# Patient Record
Sex: MALE | Race: WHITE | Employment: OTHER | URBAN - METROPOLITAN AREA
[De-identification: names, ages, dates, MRNs, and addresses within clinical notes are randomized per-mention and may not be internally consistent; named-entity substitution may affect disease eponyms.]

---

## 2017-01-18 PROBLEM — E83.51 HYPOCALCEMIA: Status: ACTIVE | Noted: 2017-01-18

## 2017-01-18 PROBLEM — E83.39 HYPOPHOSPHATEMIA: Status: ACTIVE | Noted: 2017-01-18

## 2017-01-18 PROBLEM — R77.8 ELEVATED TROPONIN: Status: ACTIVE | Noted: 2017-01-18

## 2017-01-18 PROBLEM — Z45.02 DEFIBRILLATOR DISCHARGE: Status: ACTIVE | Noted: 2017-01-18

## 2017-01-18 PROBLEM — R55 SYNCOPE: Status: ACTIVE | Noted: 2017-01-18

## 2017-01-20 PROBLEM — I47.20 VENTRICULAR TACHYCARDIA (HCC): Status: ACTIVE | Noted: 2017-01-20

## 2017-03-25 ENCOUNTER — HOSPITAL ENCOUNTER (INPATIENT)
Age: 72
LOS: 3 days | Discharge: HOME | DRG: 310 | End: 2017-03-28
Attending: INTERNAL MEDICINE | Admitting: INTERNAL MEDICINE
Payer: COMMERCIAL

## 2017-03-25 PROBLEM — Z86.79 H/O VENTRICULAR TACHYCARDIA: Chronic | Status: ACTIVE | Noted: 2017-03-25

## 2017-03-25 LAB
ANION GAP SERPL CALCULATED.3IONS-SCNC: 17 MMOL/L (ref 9–17)
BUN BLDV-MCNC: 31 MG/DL (ref 8–23)
BUN/CREAT BLD: ABNORMAL (ref 9–20)
CALCIUM SERPL-MCNC: 8.6 MG/DL (ref 8.6–10.4)
CHLORIDE BLD-SCNC: 102 MMOL/L (ref 98–107)
CO2: 20 MMOL/L (ref 20–31)
CREAT SERPL-MCNC: 1.22 MG/DL (ref 0.7–1.2)
GFR AFRICAN AMERICAN: >60 ML/MIN
GFR NON-AFRICAN AMERICAN: 58 ML/MIN
GFR SERPL CREATININE-BSD FRML MDRD: ABNORMAL ML/MIN/{1.73_M2}
GFR SERPL CREATININE-BSD FRML MDRD: ABNORMAL ML/MIN/{1.73_M2}
GLUCOSE BLD-MCNC: 146 MG/DL (ref 70–99)
GLUCOSE BLD-MCNC: 148 MG/DL (ref 75–110)
GLUCOSE BLD-MCNC: 205 MG/DL (ref 75–110)
GLUCOSE BLD-MCNC: 224 MG/DL (ref 75–110)
HCT VFR BLD CALC: 38 % (ref 41–53)
HEMOGLOBIN: 13.4 G/DL (ref 13.5–17.5)
INR BLD: 1
MAGNESIUM: 2.4 MG/DL (ref 1.6–2.6)
MCH RBC QN AUTO: 31.7 PG (ref 26–34)
MCHC RBC AUTO-ENTMCNC: 35.2 G/DL (ref 31–37)
MCV RBC AUTO: 89.9 FL (ref 80–100)
PARTIAL THROMBOPLASTIN TIME: 24.4 SEC (ref 21.3–31.3)
PDW BLD-RTO: 13.3 % (ref 12.5–15.4)
PLATELET # BLD: 188 K/UL (ref 140–450)
PMV BLD AUTO: 7.8 FL (ref 6–12)
POTASSIUM SERPL-SCNC: 4.3 MMOL/L (ref 3.7–5.3)
PROTHROMBIN TIME: 11.2 SEC (ref 9.4–12.6)
RBC # BLD: 4.23 M/UL (ref 4.5–5.9)
SODIUM BLD-SCNC: 139 MMOL/L (ref 135–144)
TROPONIN INTERP: NORMAL
TROPONIN T: <0.03 NG/ML
WBC # BLD: 7 K/UL (ref 3.5–11)

## 2017-03-25 PROCEDURE — 36415 COLL VENOUS BLD VENIPUNCTURE: CPT

## 2017-03-25 PROCEDURE — 6370000000 HC RX 637 (ALT 250 FOR IP): Performed by: INTERNAL MEDICINE

## 2017-03-25 PROCEDURE — 6360000002 HC RX W HCPCS: Performed by: INTERNAL MEDICINE

## 2017-03-25 PROCEDURE — 80048 BASIC METABOLIC PNL TOTAL CA: CPT

## 2017-03-25 PROCEDURE — 2000000000 HC ICU R&B

## 2017-03-25 PROCEDURE — 93005 ELECTROCARDIOGRAM TRACING: CPT

## 2017-03-25 PROCEDURE — 83036 HEMOGLOBIN GLYCOSYLATED A1C: CPT

## 2017-03-25 PROCEDURE — 85730 THROMBOPLASTIN TIME PARTIAL: CPT

## 2017-03-25 PROCEDURE — 85610 PROTHROMBIN TIME: CPT

## 2017-03-25 PROCEDURE — 84484 ASSAY OF TROPONIN QUANT: CPT

## 2017-03-25 PROCEDURE — 2580000003 HC RX 258: Performed by: INTERNAL MEDICINE

## 2017-03-25 PROCEDURE — 83735 ASSAY OF MAGNESIUM: CPT

## 2017-03-25 PROCEDURE — 85027 COMPLETE CBC AUTOMATED: CPT

## 2017-03-25 PROCEDURE — 82947 ASSAY GLUCOSE BLOOD QUANT: CPT

## 2017-03-25 RX ORDER — NIFEDIPINE 30 MG/1
30 TABLET, EXTENDED RELEASE ORAL DAILY
Status: DISCONTINUED | OUTPATIENT
Start: 2017-03-25 | End: 2017-03-28 | Stop reason: HOSPADM

## 2017-03-25 RX ORDER — FUROSEMIDE 40 MG/1
40 TABLET ORAL DAILY
Status: DISCONTINUED | OUTPATIENT
Start: 2017-03-25 | End: 2017-03-28 | Stop reason: HOSPADM

## 2017-03-25 RX ORDER — LISINOPRIL 20 MG/1
20 TABLET ORAL DAILY
Status: DISCONTINUED | OUTPATIENT
Start: 2017-03-25 | End: 2017-03-25

## 2017-03-25 RX ORDER — HYDROCODONE BITARTRATE AND ACETAMINOPHEN 5; 325 MG/1; MG/1
1 TABLET ORAL EVERY 6 HOURS PRN
Status: DISCONTINUED | OUTPATIENT
Start: 2017-03-25 | End: 2017-03-28 | Stop reason: HOSPADM

## 2017-03-25 RX ORDER — POTASSIUM CHLORIDE 7.45 MG/ML
10 INJECTION INTRAVENOUS PRN
Status: DISCONTINUED | OUTPATIENT
Start: 2017-03-25 | End: 2017-03-28 | Stop reason: HOSPADM

## 2017-03-25 RX ORDER — DEXTROSE MONOHYDRATE 50 MG/ML
100 INJECTION, SOLUTION INTRAVENOUS PRN
Status: DISCONTINUED | OUTPATIENT
Start: 2017-03-25 | End: 2017-03-28 | Stop reason: HOSPADM

## 2017-03-25 RX ORDER — ASPIRIN 81 MG/1
81 TABLET, CHEWABLE ORAL DAILY
Status: DISCONTINUED | OUTPATIENT
Start: 2017-03-25 | End: 2017-03-28 | Stop reason: HOSPADM

## 2017-03-25 RX ORDER — GLIMEPIRIDE 2 MG/1
1 TABLET ORAL
Status: DISCONTINUED | OUTPATIENT
Start: 2017-03-25 | End: 2017-03-28 | Stop reason: HOSPADM

## 2017-03-25 RX ORDER — LISINOPRIL 20 MG/1
20 TABLET ORAL DAILY
Status: DISCONTINUED | OUTPATIENT
Start: 2017-03-26 | End: 2017-03-28 | Stop reason: HOSPADM

## 2017-03-25 RX ORDER — CLOPIDOGREL BISULFATE 75 MG/1
75 TABLET ORAL DAILY
Status: DISCONTINUED | OUTPATIENT
Start: 2017-03-25 | End: 2017-03-28 | Stop reason: HOSPADM

## 2017-03-25 RX ORDER — HEPARIN SODIUM 5000 [USP'U]/ML
5000 INJECTION, SOLUTION INTRAVENOUS; SUBCUTANEOUS 2 TIMES DAILY
Status: DISCONTINUED | OUTPATIENT
Start: 2017-03-25 | End: 2017-03-28 | Stop reason: HOSPADM

## 2017-03-25 RX ORDER — ASPIRIN 81 MG/1
81 TABLET, CHEWABLE ORAL DAILY
Status: DISCONTINUED | OUTPATIENT
Start: 2017-03-25 | End: 2017-03-25

## 2017-03-25 RX ORDER — ONDANSETRON 2 MG/ML
4 INJECTION INTRAMUSCULAR; INTRAVENOUS EVERY 6 HOURS PRN
Status: DISCONTINUED | OUTPATIENT
Start: 2017-03-25 | End: 2017-03-28 | Stop reason: HOSPADM

## 2017-03-25 RX ORDER — ACETAMINOPHEN 325 MG/1
650 TABLET ORAL EVERY 4 HOURS PRN
Status: DISCONTINUED | OUTPATIENT
Start: 2017-03-25 | End: 2017-03-28 | Stop reason: HOSPADM

## 2017-03-25 RX ORDER — SODIUM CHLORIDE 0.9 % (FLUSH) 0.9 %
10 SYRINGE (ML) INJECTION PRN
Status: DISCONTINUED | OUTPATIENT
Start: 2017-03-25 | End: 2017-03-28 | Stop reason: HOSPADM

## 2017-03-25 RX ORDER — NICOTINE POLACRILEX 4 MG
15 LOZENGE BUCCAL PRN
Status: DISCONTINUED | OUTPATIENT
Start: 2017-03-25 | End: 2017-03-28 | Stop reason: HOSPADM

## 2017-03-25 RX ORDER — POTASSIUM CHLORIDE 20MEQ/15ML
40 LIQUID (ML) ORAL PRN
Status: DISCONTINUED | OUTPATIENT
Start: 2017-03-25 | End: 2017-03-28 | Stop reason: HOSPADM

## 2017-03-25 RX ORDER — ATORVASTATIN CALCIUM 80 MG/1
80 TABLET, FILM COATED ORAL NIGHTLY
Status: DISCONTINUED | OUTPATIENT
Start: 2017-03-25 | End: 2017-03-28 | Stop reason: HOSPADM

## 2017-03-25 RX ORDER — SODIUM CHLORIDE 0.9 % (FLUSH) 0.9 %
10 SYRINGE (ML) INJECTION EVERY 12 HOURS SCHEDULED
Status: DISCONTINUED | OUTPATIENT
Start: 2017-03-25 | End: 2017-03-28 | Stop reason: HOSPADM

## 2017-03-25 RX ORDER — DEXTROSE MONOHYDRATE 25 G/50ML
12.5 INJECTION, SOLUTION INTRAVENOUS PRN
Status: DISCONTINUED | OUTPATIENT
Start: 2017-03-25 | End: 2017-03-28 | Stop reason: HOSPADM

## 2017-03-25 RX ORDER — POTASSIUM CHLORIDE 20 MEQ/1
40 TABLET, EXTENDED RELEASE ORAL PRN
Status: DISCONTINUED | OUTPATIENT
Start: 2017-03-25 | End: 2017-03-28 | Stop reason: HOSPADM

## 2017-03-25 RX ORDER — TRIAMTERENE AND HYDROCHLOROTHIAZIDE 75; 50 MG/1; MG/1
1 TABLET ORAL DAILY
Status: DISCONTINUED | OUTPATIENT
Start: 2017-03-25 | End: 2017-03-25

## 2017-03-25 RX ORDER — LIDOCAINE HYDROCHLORIDE ANHYDROUS AND DEXTROSE MONOHYDRATE .4; 5 G/100ML; G/100ML
1 INJECTION, SOLUTION INTRAVENOUS CONTINUOUS
Status: DISCONTINUED | OUTPATIENT
Start: 2017-03-25 | End: 2017-03-25

## 2017-03-25 RX ORDER — AMIODARONE HYDROCHLORIDE 200 MG/1
200 TABLET ORAL DAILY
Status: DISCONTINUED | OUTPATIENT
Start: 2017-03-25 | End: 2017-03-25

## 2017-03-25 RX ORDER — MELOXICAM 7.5 MG/1
15 TABLET ORAL DAILY
Status: DISCONTINUED | OUTPATIENT
Start: 2017-03-25 | End: 2017-03-25

## 2017-03-25 RX ORDER — FOLIC ACID 1 MG/1
1 TABLET ORAL DAILY
Status: DISCONTINUED | OUTPATIENT
Start: 2017-03-25 | End: 2017-03-28 | Stop reason: HOSPADM

## 2017-03-25 RX ADMIN — LISINOPRIL 20 MG: 20 TABLET ORAL at 10:03

## 2017-03-25 RX ADMIN — Medication 10 ML: at 21:57

## 2017-03-25 RX ADMIN — FOLIC ACID 1 MG: 1 TABLET ORAL at 10:02

## 2017-03-25 RX ADMIN — CLOPIDOGREL 75 MG: 75 TABLET, FILM COATED ORAL at 10:02

## 2017-03-25 RX ADMIN — HEPARIN SODIUM 5000 UNITS: 5000 INJECTION, SOLUTION INTRAVENOUS; SUBCUTANEOUS at 13:15

## 2017-03-25 RX ADMIN — AMIODARONE HYDROCHLORIDE 1 MG/MIN: 50 INJECTION, SOLUTION INTRAVENOUS at 16:15

## 2017-03-25 RX ADMIN — ATORVASTATIN CALCIUM 80 MG: 80 TABLET, FILM COATED ORAL at 21:56

## 2017-03-25 RX ADMIN — METOPROLOL TARTRATE 25 MG: 25 TABLET ORAL at 14:00

## 2017-03-25 RX ADMIN — NIFEDIPINE 30 MG: 30 TABLET, FILM COATED, EXTENDED RELEASE ORAL at 10:13

## 2017-03-25 RX ADMIN — METFORMIN HYDROCHLORIDE 1000 MG: 500 TABLET ORAL at 10:03

## 2017-03-25 RX ADMIN — FUROSEMIDE 40 MG: 40 TABLET ORAL at 10:02

## 2017-03-25 RX ADMIN — ASPIRIN 81 MG: 81 TABLET, CHEWABLE ORAL at 10:02

## 2017-03-25 RX ADMIN — TRIAMTERENE AND HYDROCHLOROTHIAZIDE 1 TABLET: 75; 50 TABLET ORAL at 10:05

## 2017-03-25 RX ADMIN — LIDOCAINE HYDROCHLORIDE 1 MG/MIN: 4 INJECTION, SOLUTION INTRAVENOUS at 07:10

## 2017-03-25 RX ADMIN — DEXTROSE 300 MG: 50 INJECTION, SOLUTION INTRAVENOUS at 15:44

## 2017-03-25 RX ADMIN — AMIODARONE HYDROCHLORIDE 200 MG: 200 TABLET ORAL at 10:02

## 2017-03-25 RX ADMIN — MELOXICAM 15 MG: 7.5 TABLET ORAL at 10:04

## 2017-03-25 RX ADMIN — INSULIN LISPRO 1 UNITS: 100 INJECTION, SOLUTION INTRAVENOUS; SUBCUTANEOUS at 21:56

## 2017-03-25 RX ADMIN — HEPARIN SODIUM 5000 UNITS: 5000 INJECTION, SOLUTION INTRAVENOUS; SUBCUTANEOUS at 21:56

## 2017-03-25 RX ADMIN — INSULIN LISPRO 2 UNITS: 100 INJECTION, SOLUTION INTRAVENOUS; SUBCUTANEOUS at 17:11

## 2017-03-25 RX ADMIN — HYDROCODONE BITARTRATE AND ACETAMINOPHEN 1 TABLET: 5; 325 TABLET ORAL at 23:56

## 2017-03-25 RX ADMIN — Medication 10 ML: at 15:02

## 2017-03-25 RX ADMIN — HYDROCODONE BITARTRATE AND ACETAMINOPHEN 1 TABLET: 5; 325 TABLET ORAL at 16:26

## 2017-03-25 ASSESSMENT — PAIN DESCRIPTION - ORIENTATION: ORIENTATION: LOWER

## 2017-03-25 ASSESSMENT — PAIN SCALES - GENERAL
PAINLEVEL_OUTOF10: 8
PAINLEVEL_OUTOF10: 8
PAINLEVEL_OUTOF10: 0
PAINLEVEL_OUTOF10: 8
PAINLEVEL_OUTOF10: 0
PAINLEVEL_OUTOF10: 10

## 2017-03-25 ASSESSMENT — PAIN DESCRIPTION - PAIN TYPE: TYPE: CHRONIC PAIN

## 2017-03-25 ASSESSMENT — PAIN DESCRIPTION - LOCATION: LOCATION: BACK

## 2017-03-26 LAB
ABSOLUTE EOS #: 0.2 K/UL (ref 0–0.4)
ABSOLUTE LYMPH #: 1.9 K/UL (ref 1–4.8)
ABSOLUTE MONO #: 0.7 K/UL (ref 0.1–1.2)
ANION GAP SERPL CALCULATED.3IONS-SCNC: 13 MMOL/L (ref 9–17)
BASOPHILS # BLD: 0 % (ref 0–2)
BASOPHILS ABSOLUTE: 0 K/UL (ref 0–0.2)
BNP INTERPRETATION: ABNORMAL
BUN BLDV-MCNC: 33 MG/DL (ref 8–23)
BUN/CREAT BLD: ABNORMAL (ref 9–20)
CALCIUM SERPL-MCNC: 8.6 MG/DL (ref 8.6–10.4)
CHLORIDE BLD-SCNC: 100 MMOL/L (ref 98–107)
CO2: 26 MMOL/L (ref 20–31)
CREAT SERPL-MCNC: 1.24 MG/DL (ref 0.7–1.2)
DIFFERENTIAL TYPE: ABNORMAL
EKG ATRIAL RATE: 61 BPM
EKG Q-T INTERVAL: 496 MS
EKG QRS DURATION: 150 MS
EKG QTC CALCULATION (BAZETT): 496 MS
EKG R AXIS: -45 DEGREES
EKG T AXIS: -24 DEGREES
EKG VENTRICULAR RATE: 60 BPM
EOSINOPHILS RELATIVE PERCENT: 3 % (ref 1–4)
GFR AFRICAN AMERICAN: >60 ML/MIN
GFR NON-AFRICAN AMERICAN: 57 ML/MIN
GFR SERPL CREATININE-BSD FRML MDRD: ABNORMAL ML/MIN/{1.73_M2}
GFR SERPL CREATININE-BSD FRML MDRD: ABNORMAL ML/MIN/{1.73_M2}
GLUCOSE BLD-MCNC: 107 MG/DL (ref 75–110)
GLUCOSE BLD-MCNC: 136 MG/DL (ref 75–110)
GLUCOSE BLD-MCNC: 207 MG/DL (ref 75–110)
GLUCOSE BLD-MCNC: 243 MG/DL (ref 75–110)
GLUCOSE BLD-MCNC: 91 MG/DL (ref 70–99)
HCT VFR BLD CALC: 39.6 % (ref 41–53)
HEMOGLOBIN: 14 G/DL (ref 13.5–17.5)
LYMPHOCYTES # BLD: 27 % (ref 24–44)
MAGNESIUM: 2.3 MG/DL (ref 1.6–2.6)
MCH RBC QN AUTO: 31.5 PG (ref 26–34)
MCHC RBC AUTO-ENTMCNC: 35.3 G/DL (ref 31–37)
MCV RBC AUTO: 89.3 FL (ref 80–100)
MONOCYTES # BLD: 10 % (ref 2–11)
PDW BLD-RTO: 13.5 % (ref 12.5–15.4)
PLATELET # BLD: 176 K/UL (ref 140–450)
PLATELET ESTIMATE: ABNORMAL
PMV BLD AUTO: 8 FL (ref 6–12)
POTASSIUM SERPL-SCNC: 3.5 MMOL/L (ref 3.7–5.3)
PRO-BNP: 469 PG/ML
RBC # BLD: 4.44 M/UL (ref 4.5–5.9)
RBC # BLD: ABNORMAL 10*6/UL
SEG NEUTROPHILS: 60 % (ref 36–66)
SEGMENTED NEUTROPHILS ABSOLUTE COUNT: 4 K/UL (ref 1.8–7.7)
SODIUM BLD-SCNC: 139 MMOL/L (ref 135–144)
WBC # BLD: 6.8 K/UL (ref 3.5–11)
WBC # BLD: ABNORMAL 10*3/UL

## 2017-03-26 PROCEDURE — 2580000003 HC RX 258: Performed by: INTERNAL MEDICINE

## 2017-03-26 PROCEDURE — 6360000002 HC RX W HCPCS: Performed by: INTERNAL MEDICINE

## 2017-03-26 PROCEDURE — 85025 COMPLETE CBC W/AUTO DIFF WBC: CPT

## 2017-03-26 PROCEDURE — 2000000000 HC ICU R&B

## 2017-03-26 PROCEDURE — 82947 ASSAY GLUCOSE BLOOD QUANT: CPT

## 2017-03-26 PROCEDURE — 83735 ASSAY OF MAGNESIUM: CPT

## 2017-03-26 PROCEDURE — 80048 BASIC METABOLIC PNL TOTAL CA: CPT

## 2017-03-26 PROCEDURE — 6370000000 HC RX 637 (ALT 250 FOR IP): Performed by: INTERNAL MEDICINE

## 2017-03-26 PROCEDURE — 83880 ASSAY OF NATRIURETIC PEPTIDE: CPT

## 2017-03-26 PROCEDURE — 36415 COLL VENOUS BLD VENIPUNCTURE: CPT

## 2017-03-26 RX ORDER — TRIAMTERENE AND HYDROCHLOROTHIAZIDE 37.5; 25 MG/1; MG/1
1 TABLET ORAL DAILY
Status: DISCONTINUED | OUTPATIENT
Start: 2017-03-26 | End: 2017-03-28 | Stop reason: HOSPADM

## 2017-03-26 RX ADMIN — Medication 10 ML: at 09:15

## 2017-03-26 RX ADMIN — ASPIRIN 81 MG: 81 TABLET, CHEWABLE ORAL at 09:24

## 2017-03-26 RX ADMIN — AMIODARONE HYDROCHLORIDE 0.5 MG/MIN: 50 INJECTION, SOLUTION INTRAVENOUS at 15:43

## 2017-03-26 RX ADMIN — NIFEDIPINE 30 MG: 30 TABLET, FILM COATED, EXTENDED RELEASE ORAL at 09:04

## 2017-03-26 RX ADMIN — Medication 10 ML: at 21:04

## 2017-03-26 RX ADMIN — GLIMEPIRIDE 1 MG: 2 TABLET ORAL at 09:04

## 2017-03-26 RX ADMIN — INSULIN LISPRO 1 UNITS: 100 INJECTION, SOLUTION INTRAVENOUS; SUBCUTANEOUS at 21:03

## 2017-03-26 RX ADMIN — POTASSIUM CHLORIDE 40 MEQ: 20 TABLET, EXTENDED RELEASE ORAL at 09:13

## 2017-03-26 RX ADMIN — HYDROCODONE BITARTRATE AND ACETAMINOPHEN 1 TABLET: 5; 325 TABLET ORAL at 09:12

## 2017-03-26 RX ADMIN — AMIODARONE HYDROCHLORIDE 0.5 MG/MIN: 50 INJECTION, SOLUTION INTRAVENOUS at 00:56

## 2017-03-26 RX ADMIN — HYDROCODONE BITARTRATE AND ACETAMINOPHEN 1 TABLET: 5; 325 TABLET ORAL at 19:32

## 2017-03-26 RX ADMIN — ATORVASTATIN CALCIUM 80 MG: 80 TABLET, FILM COATED ORAL at 21:03

## 2017-03-26 RX ADMIN — TRIAMTERENE AND HYDROCHLOROTHIAZIDE 1 TABLET: 37.5; 25 TABLET ORAL at 10:14

## 2017-03-26 RX ADMIN — HEPARIN SODIUM 5000 UNITS: 5000 INJECTION, SOLUTION INTRAVENOUS; SUBCUTANEOUS at 09:05

## 2017-03-26 RX ADMIN — LISINOPRIL 20 MG: 20 TABLET ORAL at 09:20

## 2017-03-26 RX ADMIN — FUROSEMIDE 40 MG: 40 TABLET ORAL at 09:22

## 2017-03-26 RX ADMIN — CLOPIDOGREL 75 MG: 75 TABLET, FILM COATED ORAL at 09:04

## 2017-03-26 RX ADMIN — HEPARIN SODIUM 5000 UNITS: 5000 INJECTION, SOLUTION INTRAVENOUS; SUBCUTANEOUS at 21:03

## 2017-03-26 RX ADMIN — FOLIC ACID 1 MG: 1 TABLET ORAL at 09:04

## 2017-03-26 RX ADMIN — INSULIN LISPRO 2 UNITS: 100 INJECTION, SOLUTION INTRAVENOUS; SUBCUTANEOUS at 13:21

## 2017-03-26 ASSESSMENT — PAIN DESCRIPTION - LOCATION
LOCATION: GENERALIZED
LOCATION: BACK

## 2017-03-26 ASSESSMENT — PAIN SCALES - GENERAL
PAINLEVEL_OUTOF10: 1
PAINLEVEL_OUTOF10: 0
PAINLEVEL_OUTOF10: 7
PAINLEVEL_OUTOF10: 0

## 2017-03-26 ASSESSMENT — PAIN DESCRIPTION - ORIENTATION: ORIENTATION: LOWER

## 2017-03-26 ASSESSMENT — PAIN DESCRIPTION - PAIN TYPE: TYPE: CHRONIC PAIN

## 2017-03-27 LAB
ANION GAP SERPL CALCULATED.3IONS-SCNC: 14 MMOL/L (ref 9–17)
BUN BLDV-MCNC: 27 MG/DL (ref 8–23)
BUN/CREAT BLD: ABNORMAL (ref 9–20)
CALCIUM SERPL-MCNC: 8.6 MG/DL (ref 8.6–10.4)
CHLORIDE BLD-SCNC: 99 MMOL/L (ref 98–107)
CO2: 25 MMOL/L (ref 20–31)
CREAT SERPL-MCNC: 1.25 MG/DL (ref 0.7–1.2)
ESTIMATED AVERAGE GLUCOSE: 131 MG/DL
GFR AFRICAN AMERICAN: >60 ML/MIN
GFR NON-AFRICAN AMERICAN: 57 ML/MIN
GFR SERPL CREATININE-BSD FRML MDRD: ABNORMAL ML/MIN/{1.73_M2}
GFR SERPL CREATININE-BSD FRML MDRD: ABNORMAL ML/MIN/{1.73_M2}
GLUCOSE BLD-MCNC: 143 MG/DL (ref 75–110)
GLUCOSE BLD-MCNC: 150 MG/DL (ref 75–110)
GLUCOSE BLD-MCNC: 164 MG/DL (ref 75–110)
GLUCOSE BLD-MCNC: 86 MG/DL (ref 70–99)
GLUCOSE BLD-MCNC: 90 MG/DL (ref 75–110)
HBA1C MFR BLD: 6.2 % (ref 4–6)
POTASSIUM SERPL-SCNC: 3.7 MMOL/L (ref 3.7–5.3)
SODIUM BLD-SCNC: 138 MMOL/L (ref 135–144)

## 2017-03-27 PROCEDURE — 36415 COLL VENOUS BLD VENIPUNCTURE: CPT

## 2017-03-27 PROCEDURE — 6360000002 HC RX W HCPCS: Performed by: INTERNAL MEDICINE

## 2017-03-27 PROCEDURE — 6370000000 HC RX 637 (ALT 250 FOR IP): Performed by: INTERNAL MEDICINE

## 2017-03-27 PROCEDURE — 2060000000 HC ICU INTERMEDIATE R&B

## 2017-03-27 PROCEDURE — 82947 ASSAY GLUCOSE BLOOD QUANT: CPT

## 2017-03-27 PROCEDURE — 2580000003 HC RX 258: Performed by: INTERNAL MEDICINE

## 2017-03-27 PROCEDURE — 80048 BASIC METABOLIC PNL TOTAL CA: CPT

## 2017-03-27 RX ORDER — AMIODARONE HYDROCHLORIDE 200 MG/1
200 TABLET ORAL 2 TIMES DAILY
Status: DISCONTINUED | OUTPATIENT
Start: 2017-03-27 | End: 2017-03-28 | Stop reason: HOSPADM

## 2017-03-27 RX ORDER — METOPROLOL SUCCINATE 25 MG/1
12.5 TABLET, EXTENDED RELEASE ORAL DAILY
Status: DISCONTINUED | OUTPATIENT
Start: 2017-03-27 | End: 2017-03-28 | Stop reason: HOSPADM

## 2017-03-27 RX ADMIN — HYDROCODONE BITARTRATE AND ACETAMINOPHEN 1 TABLET: 5; 325 TABLET ORAL at 17:13

## 2017-03-27 RX ADMIN — INSULIN LISPRO 1 UNITS: 100 INJECTION, SOLUTION INTRAVENOUS; SUBCUTANEOUS at 21:14

## 2017-03-27 RX ADMIN — AMIODARONE HYDROCHLORIDE 200 MG: 200 TABLET ORAL at 21:13

## 2017-03-27 RX ADMIN — HYDROCODONE BITARTRATE AND ACETAMINOPHEN 1 TABLET: 5; 325 TABLET ORAL at 04:04

## 2017-03-27 RX ADMIN — NIFEDIPINE 30 MG: 30 TABLET, FILM COATED, EXTENDED RELEASE ORAL at 08:52

## 2017-03-27 RX ADMIN — METOPROLOL SUCCINATE 12.5 MG: 25 TABLET, EXTENDED RELEASE ORAL at 15:52

## 2017-03-27 RX ADMIN — ATORVASTATIN CALCIUM 80 MG: 80 TABLET, FILM COATED ORAL at 21:13

## 2017-03-27 RX ADMIN — HEPARIN SODIUM 5000 UNITS: 5000 INJECTION, SOLUTION INTRAVENOUS; SUBCUTANEOUS at 21:13

## 2017-03-27 RX ADMIN — FOLIC ACID 1 MG: 1 TABLET ORAL at 08:52

## 2017-03-27 RX ADMIN — AMIODARONE HYDROCHLORIDE 200 MG: 200 TABLET ORAL at 09:07

## 2017-03-27 RX ADMIN — LISINOPRIL 20 MG: 20 TABLET ORAL at 08:52

## 2017-03-27 RX ADMIN — GLIMEPIRIDE 1 MG: 2 TABLET ORAL at 08:52

## 2017-03-27 RX ADMIN — FUROSEMIDE 40 MG: 40 TABLET ORAL at 08:52

## 2017-03-27 RX ADMIN — Medication 10 ML: at 21:16

## 2017-03-27 RX ADMIN — CLOPIDOGREL 75 MG: 75 TABLET, FILM COATED ORAL at 08:52

## 2017-03-27 RX ADMIN — INSULIN LISPRO 1 UNITS: 100 INJECTION, SOLUTION INTRAVENOUS; SUBCUTANEOUS at 17:13

## 2017-03-27 RX ADMIN — ASPIRIN 81 MG: 81 TABLET, CHEWABLE ORAL at 08:52

## 2017-03-27 RX ADMIN — TRIAMTERENE AND HYDROCHLOROTHIAZIDE 1 TABLET: 37.5; 25 TABLET ORAL at 08:53

## 2017-03-27 RX ADMIN — HEPARIN SODIUM 5000 UNITS: 5000 INJECTION, SOLUTION INTRAVENOUS; SUBCUTANEOUS at 08:53

## 2017-03-27 RX ADMIN — HYDROCODONE BITARTRATE AND ACETAMINOPHEN 1 TABLET: 5; 325 TABLET ORAL at 23:51

## 2017-03-27 ASSESSMENT — PAIN DESCRIPTION - ORIENTATION: ORIENTATION: UPPER;LOWER

## 2017-03-27 ASSESSMENT — PAIN DESCRIPTION - PAIN TYPE
TYPE: CHRONIC PAIN
TYPE: CHRONIC PAIN

## 2017-03-27 ASSESSMENT — PAIN DESCRIPTION - DESCRIPTORS: DESCRIPTORS: CONSTANT;DISCOMFORT;SHARP

## 2017-03-27 ASSESSMENT — PAIN SCALES - GENERAL
PAINLEVEL_OUTOF10: 8
PAINLEVEL_OUTOF10: 0
PAINLEVEL_OUTOF10: 7
PAINLEVEL_OUTOF10: 0
PAINLEVEL_OUTOF10: 7

## 2017-03-27 ASSESSMENT — PAIN DESCRIPTION - LOCATION
LOCATION: BACK
LOCATION: BACK

## 2017-03-27 ASSESSMENT — PAIN DESCRIPTION - FREQUENCY: FREQUENCY: CONTINUOUS

## 2017-03-27 ASSESSMENT — PAIN DESCRIPTION - ONSET: ONSET: GRADUAL

## 2017-03-28 VITALS
RESPIRATION RATE: 17 BRPM | SYSTOLIC BLOOD PRESSURE: 110 MMHG | WEIGHT: 205.7 LBS | TEMPERATURE: 98.1 F | BODY MASS INDEX: 32.28 KG/M2 | HEIGHT: 67 IN | DIASTOLIC BLOOD PRESSURE: 57 MMHG | HEART RATE: 59 BPM | OXYGEN SATURATION: 98 %

## 2017-03-28 LAB
ANION GAP SERPL CALCULATED.3IONS-SCNC: 16 MMOL/L (ref 9–17)
BUN BLDV-MCNC: 27 MG/DL (ref 8–23)
BUN/CREAT BLD: ABNORMAL (ref 9–20)
CALCIUM SERPL-MCNC: 8.7 MG/DL (ref 8.6–10.4)
CHLORIDE BLD-SCNC: 99 MMOL/L (ref 98–107)
CO2: 26 MMOL/L (ref 20–31)
CREAT SERPL-MCNC: 1.34 MG/DL (ref 0.7–1.2)
GFR AFRICAN AMERICAN: >60 ML/MIN
GFR NON-AFRICAN AMERICAN: 52 ML/MIN
GFR SERPL CREATININE-BSD FRML MDRD: ABNORMAL ML/MIN/{1.73_M2}
GFR SERPL CREATININE-BSD FRML MDRD: ABNORMAL ML/MIN/{1.73_M2}
GLUCOSE BLD-MCNC: 174 MG/DL (ref 75–110)
GLUCOSE BLD-MCNC: 311 MG/DL (ref 75–110)
GLUCOSE BLD-MCNC: 91 MG/DL (ref 70–99)
GLUCOSE BLD-MCNC: 98 MG/DL (ref 75–110)
POTASSIUM SERPL-SCNC: 3.7 MMOL/L (ref 3.7–5.3)
SODIUM BLD-SCNC: 141 MMOL/L (ref 135–144)

## 2017-03-28 PROCEDURE — 80048 BASIC METABOLIC PNL TOTAL CA: CPT

## 2017-03-28 PROCEDURE — 6370000000 HC RX 637 (ALT 250 FOR IP): Performed by: INTERNAL MEDICINE

## 2017-03-28 PROCEDURE — 36415 COLL VENOUS BLD VENIPUNCTURE: CPT

## 2017-03-28 PROCEDURE — 82947 ASSAY GLUCOSE BLOOD QUANT: CPT

## 2017-03-28 PROCEDURE — 6360000002 HC RX W HCPCS: Performed by: INTERNAL MEDICINE

## 2017-03-28 PROCEDURE — 2580000003 HC RX 258: Performed by: INTERNAL MEDICINE

## 2017-03-28 RX ORDER — NITROGLYCERIN 0.4 MG/1
0.4 TABLET SUBLINGUAL EVERY 5 MIN PRN
Qty: 25 TABLET | Refills: 3 | Status: SHIPPED | OUTPATIENT
Start: 2017-03-28

## 2017-03-28 RX ORDER — TRIAMTERENE AND HYDROCHLOROTHIAZIDE 37.5; 25 MG/1; MG/1
1 TABLET ORAL DAILY
Qty: 30 TABLET | Refills: 3 | Status: SHIPPED | OUTPATIENT
Start: 2017-03-28 | End: 2020-02-03 | Stop reason: ALTCHOICE

## 2017-03-28 RX ORDER — METOPROLOL SUCCINATE 25 MG/1
12.5 TABLET, EXTENDED RELEASE ORAL DAILY
Qty: 30 TABLET | Refills: 3 | Status: SHIPPED | OUTPATIENT
Start: 2017-03-28 | End: 2020-02-03 | Stop reason: ALTCHOICE

## 2017-03-28 RX ADMIN — METOPROLOL SUCCINATE 12.5 MG: 25 TABLET, EXTENDED RELEASE ORAL at 08:37

## 2017-03-28 RX ADMIN — HEPARIN SODIUM 5000 UNITS: 5000 INJECTION, SOLUTION INTRAVENOUS; SUBCUTANEOUS at 08:38

## 2017-03-28 RX ADMIN — HYDROCODONE BITARTRATE AND ACETAMINOPHEN 1 TABLET: 5; 325 TABLET ORAL at 09:16

## 2017-03-28 RX ADMIN — CLOPIDOGREL 75 MG: 75 TABLET, FILM COATED ORAL at 08:36

## 2017-03-28 RX ADMIN — TRIAMTERENE AND HYDROCHLOROTHIAZIDE 1 TABLET: 37.5; 25 TABLET ORAL at 08:37

## 2017-03-28 RX ADMIN — Medication 10 ML: at 08:46

## 2017-03-28 RX ADMIN — ASPIRIN 81 MG: 81 TABLET, CHEWABLE ORAL at 08:36

## 2017-03-28 RX ADMIN — NIFEDIPINE 30 MG: 30 TABLET, FILM COATED, EXTENDED RELEASE ORAL at 08:46

## 2017-03-28 RX ADMIN — FUROSEMIDE 40 MG: 40 TABLET ORAL at 08:37

## 2017-03-28 RX ADMIN — AMIODARONE HYDROCHLORIDE 200 MG: 200 TABLET ORAL at 08:36

## 2017-03-28 RX ADMIN — INSULIN LISPRO 4 UNITS: 100 INJECTION, SOLUTION INTRAVENOUS; SUBCUTANEOUS at 12:49

## 2017-03-28 RX ADMIN — LISINOPRIL 20 MG: 20 TABLET ORAL at 08:37

## 2017-03-28 RX ADMIN — GLIMEPIRIDE 1 MG: 2 TABLET ORAL at 08:36

## 2017-03-28 RX ADMIN — FOLIC ACID 1 MG: 1 TABLET ORAL at 08:37

## 2017-03-28 RX ADMIN — HYDROCODONE BITARTRATE AND ACETAMINOPHEN 1 TABLET: 5; 325 TABLET ORAL at 15:37

## 2017-03-28 ASSESSMENT — PAIN DESCRIPTION - LOCATION
LOCATION: BACK
LOCATION: BACK

## 2017-03-28 ASSESSMENT — PAIN DESCRIPTION - PAIN TYPE
TYPE: CHRONIC PAIN
TYPE: CHRONIC PAIN

## 2017-03-28 ASSESSMENT — PAIN SCALES - GENERAL
PAINLEVEL_OUTOF10: 0
PAINLEVEL_OUTOF10: 6
PAINLEVEL_OUTOF10: 0
PAINLEVEL_OUTOF10: 6
PAINLEVEL_OUTOF10: 0

## 2017-03-29 ENCOUNTER — HOSPITAL ENCOUNTER (INPATIENT)
Age: 72
LOS: 3 days | Discharge: HOME | DRG: 310 | End: 2017-04-01
Attending: INTERNAL MEDICINE | Admitting: INTERNAL MEDICINE
Payer: COMMERCIAL

## 2017-03-29 LAB
ANION GAP SERPL CALCULATED.3IONS-SCNC: 20 MMOL/L (ref 9–17)
BUN BLDV-MCNC: 35 MG/DL (ref 8–23)
BUN/CREAT BLD: ABNORMAL (ref 9–20)
CALCIUM SERPL-MCNC: 9 MG/DL (ref 8.6–10.4)
CHLORIDE BLD-SCNC: 103 MMOL/L (ref 98–107)
CO2: 22 MMOL/L (ref 20–31)
CREAT SERPL-MCNC: 1.53 MG/DL (ref 0.7–1.2)
GFR AFRICAN AMERICAN: 55 ML/MIN
GFR NON-AFRICAN AMERICAN: 45 ML/MIN
GFR SERPL CREATININE-BSD FRML MDRD: ABNORMAL ML/MIN/{1.73_M2}
GFR SERPL CREATININE-BSD FRML MDRD: ABNORMAL ML/MIN/{1.73_M2}
GLUCOSE BLD-MCNC: 115 MG/DL (ref 70–99)
MAGNESIUM: 2.2 MG/DL (ref 1.6–2.6)
POTASSIUM SERPL-SCNC: 3.7 MMOL/L (ref 3.7–5.3)
SODIUM BLD-SCNC: 145 MMOL/L (ref 135–144)

## 2017-03-29 PROCEDURE — 2060000000 HC ICU INTERMEDIATE R&B

## 2017-03-29 PROCEDURE — 6370000000 HC RX 637 (ALT 250 FOR IP): Performed by: INTERNAL MEDICINE

## 2017-03-29 PROCEDURE — 83735 ASSAY OF MAGNESIUM: CPT

## 2017-03-29 PROCEDURE — 36415 COLL VENOUS BLD VENIPUNCTURE: CPT

## 2017-03-29 PROCEDURE — 80048 BASIC METABOLIC PNL TOTAL CA: CPT

## 2017-03-29 PROCEDURE — 2580000003 HC RX 258: Performed by: INTERNAL MEDICINE

## 2017-03-29 PROCEDURE — 6360000002 HC RX W HCPCS: Performed by: INTERNAL MEDICINE

## 2017-03-29 PROCEDURE — 76937 US GUIDE VASCULAR ACCESS: CPT

## 2017-03-29 RX ORDER — NITROGLYCERIN 0.4 MG/1
0.4 TABLET SUBLINGUAL EVERY 5 MIN PRN
Status: DISCONTINUED | OUTPATIENT
Start: 2017-03-29 | End: 2017-04-01 | Stop reason: HOSPADM

## 2017-03-29 RX ORDER — GLIMEPIRIDE 2 MG/1
1 TABLET ORAL
Status: DISCONTINUED | OUTPATIENT
Start: 2017-03-29 | End: 2017-04-01 | Stop reason: HOSPADM

## 2017-03-29 RX ORDER — MEXILETINE HYDROCHLORIDE 200 MG/1
200 CAPSULE ORAL 2 TIMES DAILY
Status: DISCONTINUED | OUTPATIENT
Start: 2017-03-29 | End: 2017-04-01 | Stop reason: HOSPADM

## 2017-03-29 RX ORDER — ASPIRIN 81 MG/1
81 TABLET, CHEWABLE ORAL DAILY
Status: DISCONTINUED | OUTPATIENT
Start: 2017-03-29 | End: 2017-04-01 | Stop reason: HOSPADM

## 2017-03-29 RX ORDER — LISINOPRIL 20 MG/1
20 TABLET ORAL DAILY
Status: DISCONTINUED | OUTPATIENT
Start: 2017-03-29 | End: 2017-04-01 | Stop reason: HOSPADM

## 2017-03-29 RX ORDER — METOPROLOL SUCCINATE 25 MG/1
12.5 TABLET, EXTENDED RELEASE ORAL DAILY
Status: DISCONTINUED | OUTPATIENT
Start: 2017-03-29 | End: 2017-04-01 | Stop reason: HOSPADM

## 2017-03-29 RX ORDER — ATORVASTATIN CALCIUM 80 MG/1
80 TABLET, FILM COATED ORAL NIGHTLY
Status: DISCONTINUED | OUTPATIENT
Start: 2017-03-29 | End: 2017-04-01 | Stop reason: HOSPADM

## 2017-03-29 RX ORDER — FUROSEMIDE 40 MG/1
40 TABLET ORAL DAILY
Status: DISCONTINUED | OUTPATIENT
Start: 2017-03-29 | End: 2017-04-01 | Stop reason: HOSPADM

## 2017-03-29 RX ORDER — FOLIC ACID 1 MG/1
1 TABLET ORAL DAILY
Status: DISCONTINUED | OUTPATIENT
Start: 2017-03-29 | End: 2017-04-01 | Stop reason: HOSPADM

## 2017-03-29 RX ORDER — TRIAMTERENE AND HYDROCHLOROTHIAZIDE 37.5; 25 MG/1; MG/1
1 TABLET ORAL DAILY
Status: DISCONTINUED | OUTPATIENT
Start: 2017-03-29 | End: 2017-04-01 | Stop reason: HOSPADM

## 2017-03-29 RX ORDER — CLOPIDOGREL BISULFATE 75 MG/1
75 TABLET ORAL DAILY
Status: DISCONTINUED | OUTPATIENT
Start: 2017-03-29 | End: 2017-04-01 | Stop reason: HOSPADM

## 2017-03-29 RX ADMIN — TRIAMTERENE AND HYDROCHLOROTHIAZIDE 1 TABLET: 37.5; 25 TABLET ORAL at 09:46

## 2017-03-29 RX ADMIN — FUROSEMIDE 40 MG: 40 TABLET ORAL at 09:49

## 2017-03-29 RX ADMIN — METOPROLOL SUCCINATE 12.5 MG: 25 TABLET, FILM COATED, EXTENDED RELEASE ORAL at 09:54

## 2017-03-29 RX ADMIN — GLIMEPIRIDE 1 MG: 2 TABLET ORAL at 09:48

## 2017-03-29 RX ADMIN — LISINOPRIL 20 MG: 20 TABLET ORAL at 09:46

## 2017-03-29 RX ADMIN — CLOPIDOGREL 75 MG: 75 TABLET, FILM COATED ORAL at 09:49

## 2017-03-29 RX ADMIN — FOLIC ACID 1 MG: 1 TABLET ORAL at 09:49

## 2017-03-29 RX ADMIN — MEXILETINE HYDROCHLORIDE 200 MG: 200 CAPSULE ORAL at 21:57

## 2017-03-29 RX ADMIN — ATORVASTATIN CALCIUM 80 MG: 80 TABLET, FILM COATED ORAL at 21:58

## 2017-03-29 RX ADMIN — METFORMIN HYDROCHLORIDE 1000 MG: 500 TABLET ORAL at 17:05

## 2017-03-29 RX ADMIN — METFORMIN HYDROCHLORIDE 1000 MG: 500 TABLET ORAL at 09:46

## 2017-03-29 RX ADMIN — AMIODARONE HYDROCHLORIDE 1 MG/MIN: 50 INJECTION, SOLUTION INTRAVENOUS at 04:34

## 2017-03-29 RX ADMIN — ASPIRIN 81 MG: 81 TABLET, CHEWABLE ORAL at 09:49

## 2017-03-29 ASSESSMENT — PAIN SCALES - GENERAL
PAINLEVEL_OUTOF10: 0

## 2017-03-30 LAB
GLUCOSE BLD-MCNC: 148 MG/DL (ref 75–110)
GLUCOSE BLD-MCNC: 179 MG/DL (ref 75–110)
GLUCOSE BLD-MCNC: 74 MG/DL (ref 75–110)
GLUCOSE BLD-MCNC: 99 MG/DL (ref 75–110)
MAGNESIUM: 2.2 MG/DL (ref 1.6–2.6)
POTASSIUM SERPL-SCNC: 3.5 MMOL/L (ref 3.7–5.3)

## 2017-03-30 PROCEDURE — 6370000000 HC RX 637 (ALT 250 FOR IP): Performed by: INTERNAL MEDICINE

## 2017-03-30 PROCEDURE — 82947 ASSAY GLUCOSE BLOOD QUANT: CPT

## 2017-03-30 PROCEDURE — 2580000003 HC RX 258: Performed by: INTERNAL MEDICINE

## 2017-03-30 PROCEDURE — 2060000000 HC ICU INTERMEDIATE R&B

## 2017-03-30 PROCEDURE — 36415 COLL VENOUS BLD VENIPUNCTURE: CPT

## 2017-03-30 PROCEDURE — 6360000002 HC RX W HCPCS: Performed by: INTERNAL MEDICINE

## 2017-03-30 PROCEDURE — 83735 ASSAY OF MAGNESIUM: CPT

## 2017-03-30 PROCEDURE — 84132 ASSAY OF SERUM POTASSIUM: CPT

## 2017-03-30 RX ORDER — POTASSIUM CHLORIDE 20 MEQ/1
40 TABLET, EXTENDED RELEASE ORAL PRN
Status: DISCONTINUED | OUTPATIENT
Start: 2017-03-30 | End: 2017-04-01 | Stop reason: HOSPADM

## 2017-03-30 RX ORDER — AMIODARONE HYDROCHLORIDE 200 MG/1
200 TABLET ORAL 2 TIMES DAILY
Status: DISCONTINUED | OUTPATIENT
Start: 2017-03-30 | End: 2017-04-01 | Stop reason: HOSPADM

## 2017-03-30 RX ORDER — POTASSIUM CHLORIDE 7.45 MG/ML
10 INJECTION INTRAVENOUS PRN
Status: DISCONTINUED | OUTPATIENT
Start: 2017-03-30 | End: 2017-04-01 | Stop reason: HOSPADM

## 2017-03-30 RX ORDER — POTASSIUM CHLORIDE 20MEQ/15ML
40 LIQUID (ML) ORAL PRN
Status: DISCONTINUED | OUTPATIENT
Start: 2017-03-30 | End: 2017-04-01 | Stop reason: HOSPADM

## 2017-03-30 RX ADMIN — AMIODARONE HYDROCHLORIDE 0.5 MG/MIN: 50 INJECTION, SOLUTION INTRAVENOUS at 06:42

## 2017-03-30 RX ADMIN — METOPROLOL SUCCINATE 12.5 MG: 25 TABLET, FILM COATED, EXTENDED RELEASE ORAL at 08:32

## 2017-03-30 RX ADMIN — MEXILETINE HYDROCHLORIDE 200 MG: 200 CAPSULE ORAL at 08:32

## 2017-03-30 RX ADMIN — METFORMIN HYDROCHLORIDE 1000 MG: 500 TABLET ORAL at 08:32

## 2017-03-30 RX ADMIN — ASPIRIN 81 MG: 81 TABLET, CHEWABLE ORAL at 08:31

## 2017-03-30 RX ADMIN — TRIAMTERENE AND HYDROCHLOROTHIAZIDE 1 TABLET: 37.5; 25 TABLET ORAL at 08:32

## 2017-03-30 RX ADMIN — FUROSEMIDE 40 MG: 40 TABLET ORAL at 08:31

## 2017-03-30 RX ADMIN — CLOPIDOGREL 75 MG: 75 TABLET, FILM COATED ORAL at 08:31

## 2017-03-30 RX ADMIN — AMIODARONE HYDROCHLORIDE 200 MG: 200 TABLET ORAL at 11:52

## 2017-03-30 RX ADMIN — METFORMIN HYDROCHLORIDE 1000 MG: 500 TABLET ORAL at 18:55

## 2017-03-30 RX ADMIN — MEXILETINE HYDROCHLORIDE 200 MG: 200 CAPSULE ORAL at 21:03

## 2017-03-30 RX ADMIN — ATORVASTATIN CALCIUM 80 MG: 80 TABLET, FILM COATED ORAL at 21:03

## 2017-03-30 RX ADMIN — GLIMEPIRIDE 1 MG: 2 TABLET ORAL at 08:32

## 2017-03-30 RX ADMIN — AMIODARONE HYDROCHLORIDE 200 MG: 200 TABLET ORAL at 21:03

## 2017-03-30 RX ADMIN — FOLIC ACID 1 MG: 1 TABLET ORAL at 08:31

## 2017-03-30 RX ADMIN — LISINOPRIL 20 MG: 20 TABLET ORAL at 08:31

## 2017-03-30 ASSESSMENT — PAIN SCALES - GENERAL
PAINLEVEL_OUTOF10: 0

## 2017-03-31 LAB
GLUCOSE BLD-MCNC: 139 MG/DL (ref 75–110)
GLUCOSE BLD-MCNC: 164 MG/DL (ref 75–110)
GLUCOSE BLD-MCNC: 69 MG/DL (ref 75–110)

## 2017-03-31 PROCEDURE — 6370000000 HC RX 637 (ALT 250 FOR IP): Performed by: INTERNAL MEDICINE

## 2017-03-31 PROCEDURE — 82947 ASSAY GLUCOSE BLOOD QUANT: CPT

## 2017-03-31 PROCEDURE — 2060000000 HC ICU INTERMEDIATE R&B

## 2017-03-31 RX ORDER — ALPRAZOLAM 0.5 MG/1
0.5 TABLET ORAL 2 TIMES DAILY PRN
Status: DISCONTINUED | OUTPATIENT
Start: 2017-03-31 | End: 2017-04-01

## 2017-03-31 RX ADMIN — MEXILETINE HYDROCHLORIDE 200 MG: 200 CAPSULE ORAL at 20:54

## 2017-03-31 RX ADMIN — AMIODARONE HYDROCHLORIDE 200 MG: 200 TABLET ORAL at 20:54

## 2017-03-31 RX ADMIN — ASPIRIN 81 MG: 81 TABLET, CHEWABLE ORAL at 08:18

## 2017-03-31 RX ADMIN — METFORMIN HYDROCHLORIDE 1000 MG: 500 TABLET ORAL at 08:17

## 2017-03-31 RX ADMIN — CLOPIDOGREL 75 MG: 75 TABLET, FILM COATED ORAL at 08:17

## 2017-03-31 RX ADMIN — AMIODARONE HYDROCHLORIDE 200 MG: 200 TABLET ORAL at 08:18

## 2017-03-31 RX ADMIN — FOLIC ACID 1 MG: 1 TABLET ORAL at 08:18

## 2017-03-31 RX ADMIN — METFORMIN HYDROCHLORIDE 1000 MG: 500 TABLET ORAL at 17:10

## 2017-03-31 RX ADMIN — GLIMEPIRIDE 1 MG: 2 TABLET ORAL at 08:18

## 2017-03-31 RX ADMIN — METOPROLOL SUCCINATE 12.5 MG: 25 TABLET, FILM COATED, EXTENDED RELEASE ORAL at 08:16

## 2017-03-31 RX ADMIN — TRIAMTERENE AND HYDROCHLOROTHIAZIDE 1 TABLET: 37.5; 25 TABLET ORAL at 08:16

## 2017-03-31 RX ADMIN — FUROSEMIDE 40 MG: 40 TABLET ORAL at 08:17

## 2017-03-31 RX ADMIN — MEXILETINE HYDROCHLORIDE 200 MG: 200 CAPSULE ORAL at 08:17

## 2017-03-31 RX ADMIN — LISINOPRIL 20 MG: 20 TABLET ORAL at 08:16

## 2017-03-31 RX ADMIN — ATORVASTATIN CALCIUM 80 MG: 80 TABLET, FILM COATED ORAL at 20:54

## 2017-03-31 ASSESSMENT — PAIN SCALES - GENERAL
PAINLEVEL_OUTOF10: 0
PAINLEVEL_OUTOF10: 0

## 2017-04-01 VITALS
HEART RATE: 61 BPM | BODY MASS INDEX: 32.33 KG/M2 | DIASTOLIC BLOOD PRESSURE: 77 MMHG | TEMPERATURE: 98.4 F | RESPIRATION RATE: 16 BRPM | OXYGEN SATURATION: 99 % | SYSTOLIC BLOOD PRESSURE: 127 MMHG | WEIGHT: 206 LBS | HEIGHT: 67 IN

## 2017-04-01 LAB
GLUCOSE BLD-MCNC: 74 MG/DL (ref 75–110)
MAGNESIUM: 2.2 MG/DL (ref 1.6–2.6)
POTASSIUM SERPL-SCNC: 3.6 MMOL/L (ref 3.7–5.3)

## 2017-04-01 PROCEDURE — 84132 ASSAY OF SERUM POTASSIUM: CPT

## 2017-04-01 PROCEDURE — 6370000000 HC RX 637 (ALT 250 FOR IP): Performed by: INTERNAL MEDICINE

## 2017-04-01 PROCEDURE — 82947 ASSAY GLUCOSE BLOOD QUANT: CPT

## 2017-04-01 PROCEDURE — 36415 COLL VENOUS BLD VENIPUNCTURE: CPT

## 2017-04-01 PROCEDURE — 83735 ASSAY OF MAGNESIUM: CPT

## 2017-04-01 RX ORDER — CLONAZEPAM 0.5 MG/1
0.25 TABLET ORAL 2 TIMES DAILY PRN
Qty: 14 TABLET | Refills: 0 | Status: SHIPPED | OUTPATIENT
Start: 2017-04-01 | End: 2020-02-03

## 2017-04-01 RX ORDER — MEXILETINE HYDROCHLORIDE 200 MG/1
200 CAPSULE ORAL 2 TIMES DAILY
Qty: 90 CAPSULE | Refills: 3 | Status: SHIPPED | OUTPATIENT
Start: 2017-04-01 | End: 2020-02-03

## 2017-04-01 RX ORDER — CLONAZEPAM 0.5 MG/1
0.25 TABLET ORAL 2 TIMES DAILY PRN
Status: DISCONTINUED | OUTPATIENT
Start: 2017-04-01 | End: 2017-04-01 | Stop reason: HOSPADM

## 2017-04-01 RX ADMIN — AMIODARONE HYDROCHLORIDE 200 MG: 200 TABLET ORAL at 08:10

## 2017-04-01 RX ADMIN — FOLIC ACID 1 MG: 1 TABLET ORAL at 08:10

## 2017-04-01 RX ADMIN — LISINOPRIL 20 MG: 20 TABLET ORAL at 08:10

## 2017-04-01 RX ADMIN — CLOPIDOGREL 75 MG: 75 TABLET, FILM COATED ORAL at 08:10

## 2017-04-01 RX ADMIN — MEXILETINE HYDROCHLORIDE 200 MG: 200 CAPSULE ORAL at 08:10

## 2017-04-01 RX ADMIN — FUROSEMIDE 40 MG: 40 TABLET ORAL at 08:10

## 2017-04-01 RX ADMIN — METOPROLOL SUCCINATE 12.5 MG: 25 TABLET, FILM COATED, EXTENDED RELEASE ORAL at 08:10

## 2017-04-01 RX ADMIN — GLIMEPIRIDE 1 MG: 2 TABLET ORAL at 08:09

## 2017-04-01 RX ADMIN — METFORMIN HYDROCHLORIDE 1000 MG: 500 TABLET ORAL at 08:10

## 2017-04-01 RX ADMIN — TRIAMTERENE AND HYDROCHLOROTHIAZIDE 1 TABLET: 37.5; 25 TABLET ORAL at 08:10

## 2017-04-01 RX ADMIN — ASPIRIN 81 MG: 81 TABLET, CHEWABLE ORAL at 08:10

## 2017-04-01 ASSESSMENT — PAIN SCALES - GENERAL: PAINLEVEL_OUTOF10: 0

## 2018-09-27 PROBLEM — R77.8 ELEVATED TROPONIN: Status: RESOLVED | Noted: 2017-01-18 | Resolved: 2018-09-27

## 2020-02-03 ENCOUNTER — HOSPITAL ENCOUNTER (OUTPATIENT)
Dept: CARDIAC CATH/INVASIVE PROCEDURES | Age: 75
Discharge: HOME | End: 2020-02-03
Payer: MEDICARE

## 2020-02-03 ENCOUNTER — HOSPITAL ENCOUNTER (OUTPATIENT)
Dept: CT IMAGING | Age: 75
Discharge: HOME | End: 2020-02-05
Payer: MEDICARE

## 2020-02-03 VITALS
HEART RATE: 42 BPM | DIASTOLIC BLOOD PRESSURE: 63 MMHG | BODY MASS INDEX: 33.19 KG/M2 | TEMPERATURE: 98.2 F | OXYGEN SATURATION: 96 % | RESPIRATION RATE: 14 BRPM | SYSTOLIC BLOOD PRESSURE: 115 MMHG | WEIGHT: 219 LBS | HEIGHT: 68 IN

## 2020-02-03 LAB
GFR NON-AFRICAN AMERICAN: 49 ML/MIN
GFR SERPL CREATININE-BSD FRML MDRD: 59 ML/MIN
GFR SERPL CREATININE-BSD FRML MDRD: ABNORMAL ML/MIN/{1.73_M2}
GLUCOSE BLD-MCNC: 94 MG/DL (ref 74–100)
LV EF: 35 %
LVEF MODALITY: NORMAL
PLATELET # BLD: 174 K/UL (ref 138–453)
POC CHLORIDE: 105 MMOL/L (ref 98–107)
POC CREATININE: 1.41 MG/DL (ref 0.51–1.19)
POC HEMATOCRIT: 39 % (ref 41–53)
POC HEMOGLOBIN: 13.2 G/DL (ref 13.5–17.5)
POC POTASSIUM: 4 MMOL/L (ref 3.5–4.5)
POC SODIUM: 144 MMOL/L (ref 138–146)

## 2020-02-03 PROCEDURE — 2709999900 HC NON-CHARGEABLE SUPPLY

## 2020-02-03 PROCEDURE — APPSS15 APP SPLIT SHARED TIME 0-15 MINUTES: Performed by: NURSE PRACTITIONER

## 2020-02-03 PROCEDURE — 93454 CORONARY ARTERY ANGIO S&I: CPT | Performed by: INTERNAL MEDICINE

## 2020-02-03 PROCEDURE — 85049 AUTOMATED PLATELET COUNT: CPT

## 2020-02-03 PROCEDURE — C1725 CATH, TRANSLUMIN NON-LASER: HCPCS

## 2020-02-03 PROCEDURE — 82565 ASSAY OF CREATININE: CPT

## 2020-02-03 PROCEDURE — 7100000000 HC PACU RECOVERY - FIRST 15 MIN

## 2020-02-03 PROCEDURE — 84132 ASSAY OF SERUM POTASSIUM: CPT

## 2020-02-03 PROCEDURE — C1769 GUIDE WIRE: HCPCS

## 2020-02-03 PROCEDURE — 71250 CT THORAX DX C-: CPT

## 2020-02-03 PROCEDURE — 93970 EXTREMITY STUDY: CPT

## 2020-02-03 PROCEDURE — 36222 PLACE CATH CAROTID/INOM ART: CPT | Performed by: INTERNAL MEDICINE

## 2020-02-03 PROCEDURE — 82947 ASSAY GLUCOSE BLOOD QUANT: CPT

## 2020-02-03 PROCEDURE — 99202 OFFICE O/P NEW SF 15 MIN: CPT | Performed by: NURSE PRACTITIONER

## 2020-02-03 PROCEDURE — 82435 ASSAY OF BLOOD CHLORIDE: CPT

## 2020-02-03 PROCEDURE — 84295 ASSAY OF SERUM SODIUM: CPT

## 2020-02-03 PROCEDURE — 93306 TTE W/DOPPLER COMPLETE: CPT

## 2020-02-03 PROCEDURE — 6360000002 HC RX W HCPCS

## 2020-02-03 PROCEDURE — 6360000004 HC RX CONTRAST MEDICATION

## 2020-02-03 PROCEDURE — C1894 INTRO/SHEATH, NON-LASER: HCPCS

## 2020-02-03 PROCEDURE — 85014 HEMATOCRIT: CPT

## 2020-02-03 PROCEDURE — 7100000001 HC PACU RECOVERY - ADDTL 15 MIN

## 2020-02-03 PROCEDURE — C1760 CLOSURE DEV, VASC: HCPCS

## 2020-02-03 RX ORDER — BUMETANIDE 2 MG/1
2 TABLET ORAL 2 TIMES DAILY
Status: ON HOLD | COMMUNITY
End: 2020-12-30 | Stop reason: SDUPTHER

## 2020-02-03 RX ORDER — GABAPENTIN 600 MG/1
600 TABLET ORAL 3 TIMES DAILY
COMMUNITY

## 2020-02-03 RX ORDER — POTASSIUM CHLORIDE 20 MEQ/1
20 TABLET, EXTENDED RELEASE ORAL 2 TIMES DAILY
Status: ON HOLD | COMMUNITY
End: 2020-12-30 | Stop reason: SDUPTHER

## 2020-02-03 RX ORDER — SODIUM CHLORIDE 9 MG/ML
INJECTION, SOLUTION INTRAVENOUS CONTINUOUS
Status: DISCONTINUED | OUTPATIENT
Start: 2020-02-03 | End: 2020-02-04 | Stop reason: HOSPADM

## 2020-02-03 RX ORDER — SODIUM CHLORIDE 0.9 % (FLUSH) 0.9 %
10 SYRINGE (ML) INJECTION PRN
Status: DISCONTINUED | OUTPATIENT
Start: 2020-02-03 | End: 2020-02-04 | Stop reason: HOSPADM

## 2020-02-03 RX ORDER — ZOLPIDEM TARTRATE 10 MG/1
TABLET ORAL NIGHTLY PRN
COMMUNITY

## 2020-02-03 RX ORDER — CITALOPRAM 40 MG/1
40 TABLET ORAL DAILY
Status: ON HOLD | COMMUNITY
End: 2020-12-28

## 2020-02-03 RX ORDER — ONDANSETRON 2 MG/ML
4 INJECTION INTRAMUSCULAR; INTRAVENOUS EVERY 6 HOURS PRN
Status: DISCONTINUED | OUTPATIENT
Start: 2020-02-03 | End: 2020-02-04 | Stop reason: HOSPADM

## 2020-02-03 RX ORDER — SODIUM CHLORIDE 0.9 % (FLUSH) 0.9 %
10 SYRINGE (ML) INJECTION EVERY 12 HOURS SCHEDULED
Status: DISCONTINUED | OUTPATIENT
Start: 2020-02-03 | End: 2020-02-04 | Stop reason: HOSPADM

## 2020-02-03 RX ORDER — ACETAMINOPHEN 325 MG/1
650 TABLET ORAL EVERY 4 HOURS PRN
Status: DISCONTINUED | OUTPATIENT
Start: 2020-02-03 | End: 2020-02-04 | Stop reason: HOSPADM

## 2020-02-03 RX ADMIN — SODIUM CHLORIDE: 9 INJECTION, SOLUTION INTRAVENOUS at 13:12

## 2020-02-03 NOTE — CONSULTS
hours as needed for Pain      nitroGLYCERIN (NITROSTAT) 0.4 MG SL tablet Place 1 tablet under the tongue every 5 minutes as needed for Chest pain up to max of 3 total doses. If no relief after 1 dose, call 911. 25 tablet 3     Current Facility-Administered Medications   Medication Dose Route Frequency Provider Last Rate Last Dose    0.9 % sodium chloride infusion   Intravenous Continuous Panfilo Maxwell MD 75 mL/hr at 02/03/20 1312      sodium chloride flush 0.9 % injection 10 mL  10 mL Intravenous 2 times per day oSl Juan MD        sodium chloride flush 0.9 % injection 10 mL  10 mL Intravenous PRN Sol Juan MD        acetaminophen (TYLENOL) tablet 650 mg  650 mg Oral Q4H PRN Sol Juan MD        magnesium hydroxide (MILK OF MAGNESIA) 400 MG/5ML suspension 30 mL  30 mL Oral Daily PRN Sol Juan MD        ondansetron [facility]) injection 4 mg  4 mg Intravenous Q6H PRN Sol Juan MD           Physical Exam:  Vitals:    02/03/20 1645   BP: 124/61   Pulse: (!) 44   Resp: 18   Temp:    SpO2: 96%     Weight: Weight: 219 lb (99.3 kg)    Weight: 219 lb (99.3 kg)        General: Alert and Oriented x3. Sitting up in bed. No apparent distress. HEENT:  Normocephalic and atraumatic. PERRL. EOMI. Lips and oral mucosa moist and without lesions. Neck:  Supple. Trachea midline. Chest:  No abnormality. Equal and symmetric expansion with respiration. Lungs:  Clear to auscultation. Cardiac:  Regular rate and rhythm without murmurs, rubs or gallops. Abdomen:  Soft, non-tender, normoactive bowel sounds. No masses or organomegaly. Extremities:  No cyanosis, clubbing, or edema. Intact pulses in all four extremities. Musculoskeletal:  Intact range of motion of peripheral joints. Normal muscular strength. Neurologic:  Cranial nerves are grossly intact. Non-focal sensory deficits on exam.  Psychiatric: Mood and affect are appropriate.       Imaging Studies:    Cardiac Cath:  LMCA: Mild

## 2020-02-03 NOTE — OP NOTE
[facility]    CARDIAC CATHETERIZATION    Date:   2/3/2020  Patient name:  Rudy Johnson  Date of admission:  2/3/2020 12:18 PM  MRN:   [id number]  YOB: 1945    Operators:  Primary:   Gwendolyn Seymour MD (Attending Physician)      Procedure performed:       [x] Left Heart Catheterization. [] Graft Angiography.  [] Left Ventriculography. [] Right Heart Catheterization. [x] Coronary Angiography. [] Aortic Valve Studies. [] PCI:      [x] Other: Carotid angiogram       Pre Procedure Conscious Sedation Data:  ASA Class:    [] I [x] II [] III [] IV    Mallampati Class:  [] I [x] II [] III [] IV      Indication:  [] STEMI      [x] + Stress test  [] ACS      [] + EKG Changes  [] Non Q MI       [] Significant Risk Factors  [x] Recurrent Angina             [x] Diabetes Mellitus    [] New LBBB      [] Uncontrolled HTN. [x] CHF / Low EF changes     [] Abnormal CTA / Ca Score      Procedure:  Access:  [x] Femoral  [] Radial  artery       [x] Right  [] Left    Procedure: After informed consent was obtained with explanation of the risks and benefits, patient was brought to the cath lab. The access area was prepped and draped in sterile fashion. 1% lidocaine was used for local block. The artery was cannulated with 6  Fr sheath with brisk arterial blood return. The side port was frequently flushed and aspirated with normal saline. Findings:    LMCA: Mild irregularities 10-20%. LAD: Patent proximal stent  Mid area stenosis 90%  D1: Minimal disease  D2: Ostial 50%    LCx: Mid 99% stenosis  PDA: Patent with 20% stenosis    RCA: Patent all stents with 20% stenosis    Peripheral Arteries and Lesion Findings  Common Carotid, Right: Mild irregularities 10-20%. Internal Carotid, Right: Ostial / proximal 40-50% stenosis    External Carotid, Right: Mild irregularities 10-20%. Common Carotid, Left: Mild irregularities 10-20%.     Internal Carotid, Left: Mild irregularities obstructive CAD      [] No CAD   [] Uncertain      [] Unknown   [] Structural Disease. Pre Procedure Medications:   [x] Yes    [] No         [x] ASA   [] Beta Blockers      [] Nitrate   [] Ca Channel Blockers      [] Ranolazine   [x] Statin       [x] Plavix/Others antiplatelets      Electronically signed on 2/3/2020 at 3:26 PM by:    Cedrick Kaminski MD  Fellow, [address]      I have reviewed the case / procedure with resident / fellow  I have examined the patient personally  Patient agree with treatment plan, correction innotes was made as appropriate, and discussed final arrangement based on  my evaluation and exam.    Risk and benefit of procedure if planned were explained in details. Procedure was performed by me, with all aspect of the procedure being done using standard protocol. Note was modified based on my own assessment and treatment.     Gwendolyn Seymour MD  [facility]

## 2020-02-03 NOTE — PROGRESS NOTES
Ambulated to bathroom and in halls. Gait steady. . Right groin puncture site and right pedal pulse assessment unchanged. Pt taken to CT for CT scan as ordered

## 2020-02-03 NOTE — PROGRESS NOTES
Received post procedure to [facility] to room 3. Assessment obtained. Restrictions reviewed with patient. Post procedure pathway initiated. Right femoral site soft , band aid dry and intact. No hematoma noted. Family at side. Patient without complaints. Head of bed flat with right leg straight.

## 2020-02-03 NOTE — PROGRESS NOTES
Patient admitted, consent signed and questions answered. Patient ready for procedure. Call light to reach with side rails up 2 of 2. Bilateral groin hair clipped. Family at bedside with patient. History and physical complete.

## 2020-02-03 NOTE — H&P
including intubation were discussed. Patient verbalized understanding and agreed to proceed with the procedure understanding the above risks and alternatives to the procedure. Electronically signed on 02/03/20 at 1:17 PM by:    Ashtyn Thorpe MD   Fellow, [address]    I have reviewed the case / procedure with resident / fellow  I have examined the patient personally  Patient agree with treatment plan, correction innotes was made as appropriate, and discussed final arrangement based on  my evaluation and exam.    Risk and benefit of procedure if planned were explained in details. Procedure was performed by me, with all aspect of the procedure being done using standard protocol. Note was modified based on my own assessment and treatment.     Matt Marie MD  [facility]

## 2020-02-04 NOTE — PATIENT INSTRUCTIONS
Patient Education        Coronary Artery Bypass Graft: Before Your Surgery  What is coronary artery bypass graft surgery? Coronary artery bypass graft (CABG) is surgery to treat coronary artery disease. It helps blood make a detour, or bypass, around one or more narrowed or blocked coronary arteries. These arteries are the blood vessels that bring blood to the heart. This is also called coronary artery bypass or bypass surgery. Your doctor will make the bypass with a healthy piece of blood vessel from another part of your body. Then he or she will attach, or graft, the healthy blood vessel to the narrowed or blocked artery. The new blood vessel bypasses the diseased artery to increase blood flow to the heart muscle. The doctor will make a cut in the skin over your breastbone (sternum). This cut is called an incision. Then the doctor will cut through your sternum to reach your heart and coronary arteries. The doctor may connect you to a heart-lung bypass machine. It adds oxygen to the blood and moves the blood through the body. The machine will allow the doctor to stop your heartbeat while he or she works on your arteries. The doctor will use blood vessels from your chest, arm, or leg to bypass the narrowed or blocked parts of your arteries. When the blood vessels are in place, the doctor will restart your heart. In some cases, the doctor may be able to do the surgery without using a heart-lung machine. This is called \"off-pump\" surgery. The doctor will use wire to put your sternum back together. He or she will use stitches or staples to close the incisions in the skin over your sternum and where your healthy blood vessel was taken. The wire will stay in your chest. The incisions will leave scars. They may fade with time. You will stay in the hospital for 3 to 8 days after surgery. You will probably be able to do many of your usual activities after 4 to 6 weeks.  But for 2 to 3 months you will not be able to lift heavy objects or do things that strain your chest or upper arm muscles. At first you may notice that you get tired quickly. You may need to rest often. It may take 1 to 2 months before your energy is back to normal.  Follow-up care is a key part of your treatment and safety. Be sure to make and go to all appointments, and call your doctor if you are having problems. It's also a good idea to know your test results and keep a list of the medicines you take. What happens before surgery?   Surgery can be stressful. This information will help you understand what you can expect. And it will help you safely prepare for surgery.   Preparing for surgery    · Understand exactly what surgery is planned, along with the risks, benefits, and other options. · Tell your doctors ALL the medicines, vitamins, supplements, and herbal remedies you take. Some of these can increase the risk of bleeding or interact with anesthesia.     · If you take aspirin or some other blood thinner, ask your doctor if you should stop taking it before your surgery. Make sure that you understand exactly what your doctor wants you to do. These medicines increase the risk of bleeding.     · Your doctor will tell you which medicines to take or stop before your surgery. You may need to stop taking certain medicines a week or more before surgery. So talk to your doctor as soon as you can.     · If you have an advance directive, let your doctor know. It may include a living will and a durable power of  for health care. Bring a copy to the hospital. If you don't have one, you may want to prepare one. It lets your doctor and loved ones know your health care wishes. Doctors advise that everyone prepare these papers before any type of surgery or procedure.     · Do not smoke. Smoking can make your coronary artery disease worse. If you need help quitting, talk to your doctor about stop-smoking programs and medicines.  These can increase your chances of quitting for good. What happens on the day of surgery? · Follow the instructions exactly about when to stop eating and drinking. If you don't, your surgery may be canceled. If your doctor told you to take your medicines on the day of surgery, take them with only a sip of water.     · Take a bath or shower before you come in for your surgery. Do not apply lotions, perfumes, deodorants, or nail polish.     · Do not shave the surgical site yourself.     · Take off all jewelry and piercings. And take out contact lenses, if you wear them.    At the hospital or surgery center   · Bring a picture ID.     · The area for surgery is often marked to make sure there are no errors.     · You will be kept comfortable and safe by your anesthesia provider. You will be asleep during the surgery.     · The surgery will take about 3 to 6 hours. This depends on the number of arteries that are bypassed and the type of surgery you have.     · You will go to the intensive care unit (ICU) right after surgery. You will probably stay in the ICU for 1 or 2 days before you go to your regular hospital room.     · You will have a breathing tube down your throat. This is usually removed within 6 hours after surgery. You will not be able to talk or drink liquids while the tube is in your throat. After the tube is removed, your throat will feel dry and scratchy. Your nurse will tell you when it is safe to drink liquids again.     · You will have a thin plastic tube, called a catheter, in a vein in your neck. It is used to keep track of how well your heart is working. This is usually removed in 1 to 3 days.     · You will have chest tubes to drain fluid and blood after surgery. The fluid and extra blood are normal and usually last only a few days. The chest tubes are usually removed in 1 or 2 days.     · You will have several thin wires coming out of your chest near your incision. These wires can help keep your heartbeat steady after surgery. They will be removed before you go home. Going home   · Be sure you have someone to drive you home.     · You will be given more specific instructions about recovering from your surgery. They will cover things like medicines, diet, wound care, follow-up care, cardiac rehab, driving, and getting back to your normal routine. When should you call your doctor? · You have questions or concerns.     · You do not understand how to prepare for your surgery.     · You become ill before surgery (such as fever, flu, or a cold).     · You need to reschedule or have changed your mind about having the surgery. Where can you learn more? Go to https://[employer] TechnologiespeExaqtWorldeb.Pixalate. org and sign in to your ubigrate account. Enter V807 in the Dustcloud box to learn more about \"Coronary Artery Bypass Graft: Before Your Surgery. \"     If you do not have an account, please click on the \"Sign Up Now\" link. Current as of: April 9, 2019  Content Version: 12.3  © 2104-4307 [employer]. Care instructions adapted under license by [employer] ([facility]). If you have questions about a medical condition or this instruction, always ask your healthcare professional. Linh Parr any warranty or liability for your use of this information. Patient Education         Bypass Surgery for Coronary Artery Disease (01:55)  Your health professional recommends that you watch this short online health video. Learn what bypass surgery does for your heart and what will happen during surgery. How to watch the video    Scan the QR code   OR Visit the website    https://hwi. se/r/Dsuklgpn1zhlh   Current as of: April 9, 2019  Content Version: 12.3  © 8964-0351 [employer]. Care instructions adapted under license by [employer] ([facility]).  If you have questions about a medical condition or this instruction, always ask your healthcare professional. Bekah López disclaims any warranty or liability

## 2020-02-10 ENCOUNTER — OFFICE VISIT (OUTPATIENT)
Dept: CARDIOTHORACIC SURGERY | Age: 75
End: 2020-02-10

## 2020-02-10 VITALS
BODY MASS INDEX: 33.04 KG/M2 | WEIGHT: 218 LBS | OXYGEN SATURATION: 96 % | SYSTOLIC BLOOD PRESSURE: 128 MMHG | HEIGHT: 68 IN | DIASTOLIC BLOOD PRESSURE: 65 MMHG | HEART RATE: 45 BPM | RESPIRATION RATE: 16 BRPM | TEMPERATURE: 97 F

## 2020-02-10 PROCEDURE — APPSS15 APP SPLIT SHARED TIME 0-15 MINUTES: Performed by: NURSE PRACTITIONER

## 2020-02-10 PROCEDURE — 99024 POSTOP FOLLOW-UP VISIT: CPT | Performed by: NURSE PRACTITIONER

## 2020-02-10 NOTE — PROGRESS NOTES
relief after 1 dose, call 911., Disp: 25 tablet, Rfl: 3    atorvastatin (LIPITOR) 80 MG tablet, Take 1 tablet by mouth nightly, Disp: 30 tablet, Rfl: 3    glimepiride (AMARYL) 1 MG tablet, Take 1 mg by mouth every morning (before breakfast), Disp: , Rfl:     Omega-3 Fatty Acids (FISH OIL PO), Take 1 tablet by mouth 2 times daily, Disp: , Rfl:     metFORMIN (GLUCOPHAGE) 1000 MG tablet, Take 1 tablet by mouth 2 times daily (with meals), Disp: 60 tablet, Rfl: 3    aspirin 81 MG chewable tablet, Take 81 mg by mouth daily, Disp: , Rfl:     folic acid (FOLVITE) 1 MG tablet, Take 1 mg by mouth daily, Disp: , Rfl:     clopidogrel (PLAVIX) 75 MG tablet, Take 75 mg by mouth daily, Disp: , Rfl:     HYDROcodone-acetaminophen (NORCO) 5-325 MG per tablet, Take 1 tablet by mouth every 6 hours as needed for Pain, Disp: , Rfl:     Past Surgical History:   Procedure Laterality Date    CARDIAC CATHETERIZATION      multiple, last 4-2016    CARDIAC DEFIBRILLATOR PLACEMENT  07/11/2016    CORONARY ANGIOPLASTY      multiple last date 4-2016       Social Hx: reports that he has quit smoking. He has never used smokeless tobacco.    Assessment & Plan:   After reviewing chart diagnostic findings (echo and cath report) patient educated that stenting may be in a better option for him due to age, and vessels are distensible on cath. Advised to get cardiology's opinion regarding stenting. Stenting is not an option, patient will be scheduled for CABG. cardiology appointment made tomorrow at 10 AM.  We will wait to hear from cardiology to determine if CABG is appropriate for patient.       EMILIANO VELAZQUEZ,SHANDRA CNP

## 2020-02-12 ENCOUNTER — HOSPITAL ENCOUNTER (OUTPATIENT)
Dept: CARDIAC CATH/INVASIVE PROCEDURES | Age: 75
Discharge: HOME | End: 2020-02-13
Attending: INTERNAL MEDICINE | Admitting: INTERNAL MEDICINE
Payer: MEDICARE

## 2020-02-12 PROBLEM — Z95.820 S/P ANGIOPLASTY WITH STENT: Status: ACTIVE | Noted: 2020-02-12

## 2020-02-12 PROCEDURE — C1887 CATHETER, GUIDING: HCPCS

## 2020-02-12 PROCEDURE — C1894 INTRO/SHEATH, NON-LASER: HCPCS

## 2020-02-12 PROCEDURE — 6370000000 HC RX 637 (ALT 250 FOR IP)

## 2020-02-12 PROCEDURE — C1769 GUIDE WIRE: HCPCS

## 2020-02-12 PROCEDURE — C9600 PERC DRUG-EL COR STENT SING: HCPCS | Performed by: INTERNAL MEDICINE

## 2020-02-12 PROCEDURE — C1876 STENT, NON-COA/NON-COV W/DEL: HCPCS

## 2020-02-12 PROCEDURE — 7100000001 HC PACU RECOVERY - ADDTL 15 MIN

## 2020-02-12 PROCEDURE — 6360000002 HC RX W HCPCS

## 2020-02-12 PROCEDURE — 7100000000 HC PACU RECOVERY - FIRST 15 MIN

## 2020-02-12 PROCEDURE — 2709999900 HC NON-CHARGEABLE SUPPLY

## 2020-02-12 PROCEDURE — 2500000003 HC RX 250 WO HCPCS

## 2020-02-12 PROCEDURE — C1725 CATH, TRANSLUMIN NON-LASER: HCPCS

## 2020-02-12 PROCEDURE — 6360000004 HC RX CONTRAST MEDICATION

## 2020-02-12 PROCEDURE — C1874 STENT, COATED/COV W/DEL SYS: HCPCS

## 2020-02-12 RX ORDER — ATORVASTATIN CALCIUM 80 MG/1
80 TABLET, FILM COATED ORAL NIGHTLY
Status: DISCONTINUED | OUTPATIENT
Start: 2020-02-12 | End: 2020-02-13 | Stop reason: HOSPADM

## 2020-02-12 RX ORDER — HYDROCODONE BITARTRATE AND ACETAMINOPHEN 5; 325 MG/1; MG/1
1 TABLET ORAL EVERY 4 HOURS PRN
Status: DISCONTINUED | OUTPATIENT
Start: 2020-02-12 | End: 2020-02-13 | Stop reason: HOSPADM

## 2020-02-12 RX ORDER — FOLIC ACID 1 MG/1
1 TABLET ORAL DAILY
Status: DISCONTINUED | OUTPATIENT
Start: 2020-02-12 | End: 2020-02-13 | Stop reason: HOSPADM

## 2020-02-12 RX ORDER — NITROGLYCERIN 0.4 MG/1
0.4 TABLET SUBLINGUAL EVERY 5 MIN PRN
Status: DISCONTINUED | OUTPATIENT
Start: 2020-02-12 | End: 2020-02-13 | Stop reason: HOSPADM

## 2020-02-12 RX ORDER — SODIUM CHLORIDE 9 MG/ML
INJECTION, SOLUTION INTRAVENOUS CONTINUOUS
Status: DISCONTINUED | OUTPATIENT
Start: 2020-02-12 | End: 2020-02-13 | Stop reason: HOSPADM

## 2020-02-12 RX ORDER — ONDANSETRON 2 MG/ML
4 INJECTION INTRAMUSCULAR; INTRAVENOUS EVERY 6 HOURS PRN
Status: DISCONTINUED | OUTPATIENT
Start: 2020-02-12 | End: 2020-02-13 | Stop reason: HOSPADM

## 2020-02-12 RX ORDER — POTASSIUM CHLORIDE 20 MEQ/1
20 TABLET, EXTENDED RELEASE ORAL 2 TIMES DAILY
Status: DISCONTINUED | OUTPATIENT
Start: 2020-02-12 | End: 2020-02-13 | Stop reason: HOSPADM

## 2020-02-12 RX ORDER — BUMETANIDE 1 MG/1
2 TABLET ORAL 2 TIMES DAILY
Status: DISCONTINUED | OUTPATIENT
Start: 2020-02-12 | End: 2020-02-13 | Stop reason: HOSPADM

## 2020-02-12 RX ORDER — SODIUM CHLORIDE 0.9 % (FLUSH) 0.9 %
10 SYRINGE (ML) INJECTION PRN
Status: DISCONTINUED | OUTPATIENT
Start: 2020-02-12 | End: 2020-02-13 | Stop reason: HOSPADM

## 2020-02-12 RX ORDER — CLOPIDOGREL BISULFATE 75 MG/1
75 TABLET ORAL DAILY
Status: DISCONTINUED | OUTPATIENT
Start: 2020-02-12 | End: 2020-02-13 | Stop reason: HOSPADM

## 2020-02-12 RX ORDER — ASPIRIN 81 MG/1
81 TABLET, CHEWABLE ORAL DAILY
Status: DISCONTINUED | OUTPATIENT
Start: 2020-02-12 | End: 2020-02-13 | Stop reason: HOSPADM

## 2020-02-12 RX ORDER — CITALOPRAM 20 MG/1
40 TABLET ORAL DAILY
Status: DISCONTINUED | OUTPATIENT
Start: 2020-02-12 | End: 2020-02-13 | Stop reason: HOSPADM

## 2020-02-12 RX ORDER — GABAPENTIN 300 MG/1
300 CAPSULE ORAL 3 TIMES DAILY
Status: DISCONTINUED | OUTPATIENT
Start: 2020-02-12 | End: 2020-02-13 | Stop reason: HOSPADM

## 2020-02-12 RX ORDER — ZOLPIDEM TARTRATE 5 MG/1
5 TABLET ORAL NIGHTLY PRN
Status: DISCONTINUED | OUTPATIENT
Start: 2020-02-12 | End: 2020-02-13 | Stop reason: HOSPADM

## 2020-02-12 RX ORDER — ACETAMINOPHEN 325 MG/1
650 TABLET ORAL EVERY 4 HOURS PRN
Status: DISCONTINUED | OUTPATIENT
Start: 2020-02-12 | End: 2020-02-13 | Stop reason: HOSPADM

## 2020-02-12 RX ORDER — SODIUM CHLORIDE 0.9 % (FLUSH) 0.9 %
10 SYRINGE (ML) INJECTION EVERY 12 HOURS SCHEDULED
Status: DISCONTINUED | OUTPATIENT
Start: 2020-02-12 | End: 2020-02-13 | Stop reason: HOSPADM

## 2020-02-12 RX ADMIN — SODIUM CHLORIDE: 9 INJECTION, SOLUTION INTRAVENOUS at 13:10

## 2020-02-12 ASSESSMENT — PAIN SCALES - GENERAL: PAINLEVEL_OUTOF10: 0

## 2020-02-12 NOTE — OP NOTE
[facility]    CARDIAC CATHETERIZATION    Date:   2/12/2020  Patient name:  Francisco Albright  Date of admission:  No admission date for patient encounter. MRN:   [id number]  YOB: 1945    Operators:  Primary:   Jaclyn Segura MD (Attending Physician)    Procedure performed:   [] Left Heart Catheterization. [] Graft Angiography.  [] Left Ventriculography. [] Right Heart Catheterization. [x] Coronary Angiography. [] Aortic Valve Studies. [x] PCI: LAD, LCX    [] Other:       Pre Procedure Conscious Sedation Data:  ASA Class:    [] I [] II [x] III [] IV    Mallampati Class:  [] I [x] II [] III [] IV      Indication:  Multivessel CAD - not a candidate for CABG    Procedure:  Access:  [x] Femoral  [] Radial  artery       [x] Right  [] Left    Procedure: After informed consent was obtained with explanation of the risks and benefits, patient was brought to the cath lab. The access area was prepped and draped in sterile fashion. 1% lidocaine was used for local block. The artery was cannulated with 6 Fr sheath with brisk arterial blood return. The side port was frequently flushed and aspirated with normal saline. Findings:    LAD: 80% proximal stenosis reduced to 0% using 2.75x18 mm Xience stent, post dilated using 3 mm NC balloon. 90% mid stenosis, small caliber reduced to 0% using 2x23 and 2x15 mm Mini vision stents post dilated using 2 mm NC balloon. LCx: 90% distal stenosis reduced to 0% using 2.5x28 mm Xience and post dilated using 2.75 mm NC balloon. 70% mid stenosis reduced to 0% using 3x38 mm Xience stent and post dilated using 3 mm NC balloon. Estimated Blood Loss: 20 mL    Conclusions:  1. LCX 90% distal stenosis reduced to 0% using 2.5x28 mm Xience and post dilated using 2.75 mm NC balloon. 70% mid stenosis reduced to 0% using 3x38 mm Xience stent and post dilated using 3 mm NC balloon.   2. LAD 80% proximal stenosis reduced to 0% using 2.75x18 mm Xience stent, post dilated using 3 mm NC balloon. 90% mid stenosis, small caliber reduced to 0% using 2x23 and 2x15 mm Mini vision stents post dilated using 2 mm NC balloon. Recommendations:  1. Post-stent protocol  2. Dual-antiplatelet therapy for at least 1 year  3. Continue optimal medical therapy  4. Risk factor modification    Electronically signed on 2/12/2020 at 5:08 PM by:    Rachelle Iglesias MD  Fellow, [address] [place] [address]. Bruno Wilson MD,   [facility].

## 2020-02-12 NOTE — OP NOTE
[facility]    CARDIAC CATHETERIZATION    Date:   2/12/2020  Patient name:  Es Hamm  Date of admission:  No admission date for patient encounter. MRN:   [id number]  YOB: 1945    Operators:  Primary:   Tashi Rodríguez MD (Attending Physician)      Procedure performed:       [x] Left Heart Catheterization. [] Graft Angiography.  [] Left Ventriculography. [] Right Heart Catheterization. [x] Coronary Angiography. [] Aortic Valve Studies. [x] PCI:      [] Other:       Pre Procedure Conscious Sedation Data:  ASA Class:    [] I [x] II [] III [] IV    Mallampati Class:  [] I [x] II [] III [] IV      Indication:  [] STEMI      [x] + Stress test  [] ACS      [] + EKG Changes  [] Non Q MI       [] Significant Risk Factors  [x] Recurrent Angina             [x] Diabetes Mellitus    [] New LBBB      [] Uncontrolled HTN. [x] CHF / Low EF changes     [] Abnormal CTA / Ca Score      Procedure:  Access:  [x] Femoral  [] Radial  artery       [] Right  [x] Left    Procedure: After informed consent was obtained with explanation of the risks and benefits, patient was brought to the cath lab. The access area was prepped and draped in sterile fashion. 1% lidocaine was used for local block. The artery was cannulated with 6  Fr sheath with brisk arterial blood return. The side port was frequently flushed and aspirated with normal saline. Findings:    LAD: 80% proximal stenosis reduced to 0% using 2.75x18 mm Xience stent, post  dilated using 3 mm NC balloon. 90% mid stenosis, small caliber reduced to 0%  using 2x23 and 2x15 mm Mini vision stents post dilated using 2 mm NC  balloon. Lesion on Mid LAD: Mid subsection. 90% stenosis 20 mm length reduced to    0%. Pre procedure VERENICE III flow was noted. Post Procedure VERENICE III flow    was present. Good runoff was present. The lesion was diagnosed as    Moderate Risk (B). Devices used       - Runthrough NS. Number of passes: 1. - Mini Trek Balloon 2.0mm x 12mm. 2 inflation(s) to a max pressure of:    8 latrice. - NC Trek 2.51t56rf Balloon. 3 inflation(s) to a max pressure of: 20    latrice. - Vision Stent 2.0x23mm BMS. 1 inflation(s) to a max pressure of: 10    latrice. - Vision Stent 2.0x15mm BMS. 1 inflation(s) to a max pressure of: 10    latrice. - NC Quantum [place] Balloon 2.0x15. 3 inflation(s) to a max pressure of:    20 latrice. Lesion on Prox LAD: Proximal subsection. 80% stenosis 12 mm length reduced    to 0%. Pre procedure VERENICE III flow was noted. Post Procedure VERENICE III    flow was present. Good runoff was present. The lesion was diagnosed as    Moderate Risk (B). Devices used       - Mini Trek Balloon 2.0mm x 12mm. 1 inflation(s) to a max pressure of:    12 latrice. - Xience Chary 2.75 x 18 PO. 1 inflation(s) to a max pressure of: 14    latrice. - NC Quantum [place] Balloon 3.0x15. 1 inflation(s) to a max pressure of:    14 latrice. LCx: 90% distal stenosis reduced to 0% using 2.5x28 mm Xience and post  dilated using 2.75 mm NC balloon. 70% mid stenosis reduced to 0% using 3x38  mm Xience stent and post dilated using 3 mm NC balloon. Lesion on Mid CX: Mid subsection. 70% stenosis 15 mm length reduced to 0%. Pre procedure VERENICE III flow was noted. Post Procedure VERENICE III flow was    present. Good runoff was present. The lesion was diagnosed as Moderate    Risk (B). Devices used       - Runthrough NS. Number of passes: 1.       - Mini Trek Balloon 2.0mm x 15mm. 3 inflation(s) to a max pressure of:    12 latrice. - Xience Chary 2.5 x 28 PO. 1 inflation(s) to a max pressure of: 10    latrice. - Xience Chary 3.0 x 38 PO. 1 inflation(s) to a max pressure of: 12    latrice. - NC Quantum [place] Balloon 2.75x15. 1 inflation(s) to a max pressure of:    16 latrice. - NC Quantum [place] Balloon 3.0x15. 2 inflation(s) to a max pressure of:    16 latrice. Lesion on Dist CX: Distal subsection. 90% stenosis 20 mm length reduced to    0%. Pre procedure EVRENICE III flow was noted. Post Procedure VERENICE III flow    was present. Good runoff was present. The lesion was diagnosed as    Moderate Risk (B). Coronary Tree      Dominance: Right    The LV gram was not performed. Estimated Blood Loss: 20 mL    Conclusions      Procedure Summary      LCX 90% distal stenosis reduced to 0% using 2.5x28 mm Xience and post   dilated using 2.75 mm NC balloon. 70% mid stenosis reduced to 0% using   3x38 mm Xience stent and post dilated using 3 mm NC balloon. LAD 80% proximal stenosis reduced to 0% using 2.75x18 mm Xience stent,   post dilated using 3 mm NC balloon. 90% mid stenosis, small caliber   reduced to 0% using 2x23 and 2x15 mm Mini vision stents post dilated using   2 mm NC balloon. Recommendations      DAPT and risk factor modifications. ____________________________________________________________________    History and Risk Factors    [x] Hypertension     [] Family history of CAD  [x] Hyperlipidemia     [] Cerebrovascular Disease   [] Prior MI       [] Peripheral Vascular disease   [x] Prior PCI              [x] Diabetes Mellitus    [] Left Main PCI. [] Currently on Dialysis. [] Prior CABG. [] Currently smoker. [] Cardiac Arrest outside of healthcare facility. [] Yes    [x] No        Witnessed     [] Yes   [] No     Arrest after arrival of EMS  [] Yes   [] No     [] Cardiac Arrest at other Facility. [] Yes   [x] No    Pre-Procedure Information. Heart Failure       [x] Yes    [] No        Class  [] I      [x] II  [] III    [] IV. New Diagnosis    [] Yes  [] No    HF Type      [x] Systolic   [] Diastolic          [] Unknown. Diagnostic Test:   EKG       [] Normal   [x] Abnormal    New antiarrhythmia medications:    [] Yes   [] No   New onset atrial fibrillation / Flutter     [] Yes   [] No   ECG Abnormalities:      [] V. Fib   [] Jessica V.  Tach           [] NS V. T   [] New LBBB           [] T.

## 2020-02-12 NOTE — H&P
[facility]  Procedure History and Physical Update          Patient Name: Jesus Kay  MRN:    [id number]  YOB: 1945  Date of evaluation:  2/12/2020    Procedure:    Percutaneous Coronary Intervention (PCI)    Indication for procedure:  Multivessel CAD - not a candidate for CABG      Please refer to the office note completed by Dr. Laisha Romero on 1/16/2020 in the medical record and note that:    [] I have examined the patient and reviewed the H&P/Consult and there are no changes to be made to the assessment or plan. [x] I have examined the patient and reviewed the H&P/Consult and have noted the following changes:   Carotid duplex showed:       Stress test showed:       Cath showed:  LMCA: Mild irregularities 10-20%. LAD: Patent proximal stent  Mid area stenosis 90%  D1: Minimal disease  D2: Ostial 50%    LCx: Mid 99% stenosis  PDA: Patent with 20% stenosis    RCA: Patent all stents with 20% stenosis    He was referred to CT surgery and was recommended to have PCI instead of CABG. He is here for the PCI. Past Medical History:   Diagnosis Date    Arthritis     CAD (coronary artery disease)     stents x7    Clinical trial participant at discharge 07/12/2016    [employer]    Diabetes mellitus ([address]     Former smoker Pt states he quit 27 years ago   [employer] H/O ventricular tachycardia 3/25/2017    Hyperlipidemia     Hypertension        Past Surgical History:   Procedure Laterality Date    CARDIAC CATHETERIZATION      multiple, last 4-2016    CARDIAC DEFIBRILLATOR PLACEMENT  07/11/2016    CORONARY ANGIOPLASTY      multiple last date 4-2016    CORONARY ANGIOPLASTY  02/12/2020       Family History   Problem Relation Age of Onset    Heart Failure Mother        No Known Allergies    Prior to Admission medications    Medication Sig Start Date End Date Taking? Authorizing Provider   gabapentin (NEURONTIN) 300 MG capsule Take 300 mg by mouth 3 times daily.    Yes Historical Provider, MD   citalopram (CELEXA) 40 MG tablet Take 40 mg by mouth daily   Yes Historical Provider, MD   sacubitril-valsartan (ENTRESTO) 49-51 MG per tablet Take 1 tablet by mouth 2 times daily   Yes Historical Provider, MD   bumetanide (BUMEX) 2 MG tablet Take 2 mg by mouth 2 times daily   Yes Historical Provider, MD   potassium chloride (KLOR-CON M) 20 MEQ extended release tablet Take 20 mEq by mouth 2 times daily   Yes Historical Provider, MD   zolpidem (AMBIEN) 10 MG tablet Take by mouth nightly as needed for Sleep. Yes Historical Provider, MD   atorvastatin (LIPITOR) 80 MG tablet Take 1 tablet by mouth nightly 1/23/17  Yes Johnny Turner DO   glimepiride (AMARYL) 1 MG tablet Take 1 mg by mouth every morning (before breakfast)   Yes Historical Provider, MD   Omega-3 Fatty Acids (FISH OIL PO) Take 1 tablet by mouth 2 times daily   Yes Historical Provider, MD   metFORMIN (GLUCOPHAGE) 1000 MG tablet Take 1 tablet by mouth 2 times daily (with meals) 4/7/16  Yes SHANDRA Lemus CNP   aspirin 81 MG chewable tablet Take 81 mg by mouth daily   Yes Historical Provider, MD   folic acid (FOLVITE) 1 MG tablet Take 1 mg by mouth daily   Yes Historical Provider, MD   clopidogrel (PLAVIX) 75 MG tablet Take 75 mg by mouth daily   Yes Historical Provider, MD   nitroGLYCERIN (NITROSTAT) 0.4 MG SL tablet Place 1 tablet under the tongue every 5 minutes as needed for Chest pain up to max of 3 total doses.  If no relief after 1 dose, call 911. 3/28/17   Adell Goldmann, APRN - CNP   HYDROcodone-acetaminophen (NORCO) 5-325 MG per tablet Take 1 tablet by mouth every 6 hours as needed for Pain    Historical Provider, MD         Vitals:    02/12/20 1251   BP: 138/66   Pulse: (!) 45   Resp: 16   Temp: 97.3 °F (36.3 °C)   SpO2: 98%       Constitutional and General Appearance:   alert, cooperative, no distress and appears stated age  HEENT:  · PERRL, EOMI  Respiratory:  · Normal excursion and expansion without use of accessory muscles  · Resp Auscultation: Fair respiratory effort. No for increased work of breathing. On auscultation: clear to auscultation bilaterally  Cardiovascular:  · Regular rate and rhythm  · S1/S2  · No murmurs  · The apical impulse is not displaced  Abdomen:  · Soft  · Bowel sounds present  · Non-tender to palpation  Extremities:  · No cyanosis or clubbing  · Lower extremity edema: No  Skin:  · Warm and dry  Neurological:  · Alert and oriented. · Moves all extremities well      Plan:  · Proceed with planned procedure. · Further orders to follow. Risks, benefits, and alternatives of PCI were discussed, in detail, with patient. Risks include, but not limited to, bleeding, requiring blood transfusion, vascular complication requiring surgery, renal failure with need of dialysis, CVA, MI, death and anesthesia complications including intubation were discussed. Patient verbalized understanding and agreed to proceed with the procedure understanding the above risks and alternatives to the procedure.       Electronically signed on 02/12/20 at 2:45 PM by:    Vivek Wilcox MD   Fellow, [address]

## 2020-02-12 NOTE — PROGRESS NOTES
Received post procedure to [facility] to room 1. Assessment obtained. Restrictions reviewed with patient. Post procedure pathway initiated. Left arterial and venous site soft ,dressing dry and intact. Venous line to NS infusion, Arterial line transduced to cobe. No hematoma noted. Family at side. Patient without complaints. Head of bed flat with left leg straight.

## 2020-02-13 VITALS
HEIGHT: 68 IN | DIASTOLIC BLOOD PRESSURE: 59 MMHG | WEIGHT: 211.2 LBS | SYSTOLIC BLOOD PRESSURE: 118 MMHG | BODY MASS INDEX: 32.01 KG/M2 | TEMPERATURE: 98.2 F | HEART RATE: 47 BPM | RESPIRATION RATE: 16 BRPM | OXYGEN SATURATION: 96 %

## 2020-02-13 LAB
ANION GAP SERPL CALCULATED.3IONS-SCNC: 14 MMOL/L (ref 9–17)
BUN BLDV-MCNC: 26 MG/DL (ref 8–23)
BUN/CREAT BLD: ABNORMAL (ref 9–20)
CALCIUM SERPL-MCNC: 8.8 MG/DL (ref 8.6–10.4)
CHLORIDE BLD-SCNC: 104 MMOL/L (ref 98–107)
CO2: 23 MMOL/L (ref 20–31)
CREAT SERPL-MCNC: 1.15 MG/DL (ref 0.7–1.2)
EKG ATRIAL RATE: 76 BPM
EKG P AXIS: 45 DEGREES
EKG Q-T INTERVAL: 512 MS
EKG QRS DURATION: 132 MS
EKG QTC CALCULATION (BAZETT): 427 MS
EKG R AXIS: -42 DEGREES
EKG T AXIS: 52 DEGREES
EKG VENTRICULAR RATE: 42 BPM
GFR AFRICAN AMERICAN: >60 ML/MIN
GFR NON-AFRICAN AMERICAN: >60 ML/MIN
GFR SERPL CREATININE-BSD FRML MDRD: ABNORMAL ML/MIN/{1.73_M2}
GFR SERPL CREATININE-BSD FRML MDRD: ABNORMAL ML/MIN/{1.73_M2}
GLUCOSE BLD-MCNC: 122 MG/DL (ref 70–99)
HCT VFR BLD CALC: 40.5 % (ref 40.7–50.3)
HEMOGLOBIN: 13.3 G/DL (ref 13–17)
MAGNESIUM: 2.4 MG/DL (ref 1.6–2.6)
MCH RBC QN AUTO: 32.1 PG (ref 25.2–33.5)
MCHC RBC AUTO-ENTMCNC: 32.8 G/DL (ref 28.4–34.8)
MCV RBC AUTO: 97.8 FL (ref 82.6–102.9)
NRBC AUTOMATED: 0 PER 100 WBC
PDW BLD-RTO: 12.8 % (ref 11.8–14.4)
PLATELET # BLD: 167 K/UL (ref 138–453)
PMV BLD AUTO: 10.4 FL (ref 8.1–13.5)
POTASSIUM SERPL-SCNC: 3.4 MMOL/L (ref 3.7–5.3)
RBC # BLD: 4.14 M/UL (ref 4.21–5.77)
SODIUM BLD-SCNC: 141 MMOL/L (ref 135–144)
WBC # BLD: 6.1 K/UL (ref 3.5–11.3)

## 2020-02-13 PROCEDURE — 80048 BASIC METABOLIC PNL TOTAL CA: CPT

## 2020-02-13 PROCEDURE — 36415 COLL VENOUS BLD VENIPUNCTURE: CPT

## 2020-02-13 PROCEDURE — 93005 ELECTROCARDIOGRAM TRACING: CPT | Performed by: INTERNAL MEDICINE

## 2020-02-13 PROCEDURE — 85027 COMPLETE CBC AUTOMATED: CPT

## 2020-02-13 PROCEDURE — 6370000000 HC RX 637 (ALT 250 FOR IP): Performed by: STUDENT IN AN ORGANIZED HEALTH CARE EDUCATION/TRAINING PROGRAM

## 2020-02-13 PROCEDURE — 83735 ASSAY OF MAGNESIUM: CPT

## 2020-02-13 PROCEDURE — 2580000003 HC RX 258: Performed by: INTERNAL MEDICINE

## 2020-02-13 RX ORDER — POTASSIUM CHLORIDE 7.45 MG/ML
10 INJECTION INTRAVENOUS PRN
Status: DISCONTINUED | OUTPATIENT
Start: 2020-02-13 | End: 2020-02-13 | Stop reason: HOSPADM

## 2020-02-13 RX ORDER — POTASSIUM CHLORIDE 20 MEQ/1
40 TABLET, EXTENDED RELEASE ORAL PRN
Status: DISCONTINUED | OUTPATIENT
Start: 2020-02-13 | End: 2020-02-13 | Stop reason: HOSPADM

## 2020-02-13 RX ADMIN — SODIUM CHLORIDE, PRESERVATIVE FREE 10 ML: 5 INJECTION INTRAVENOUS at 09:00

## 2020-02-13 RX ADMIN — POTASSIUM CHLORIDE 40 MEQ: 1500 TABLET, EXTENDED RELEASE ORAL at 07:24

## 2020-02-13 NOTE — DISCHARGE SUMMARY
LAD: Mid subsection. 90% stenosis 20 mm length reduced to    0%. Pre procedure VERENICE III flow was noted. Post Procedure VERENICE III flow    was present. Good runoff was present. The lesion was diagnosed as    Moderate Risk (B).      Devices used       - Runthrough NS. Number of passes: 1.       - Mini Trek Balloon 2.0mm x 12mm. 2 inflation(s) to a max pressure of:    8 latrice.       - NC Trek 2.68g33ij Balloon. 3 inflation(s) to a max pressure of: 20    latrice.       - Vision Stent 2.0x23mm BMS. 1 inflation(s) to a max pressure of: 10    latrice.       - Vision Stent 2.0x15mm BMS. 1 inflation(s) to a max pressure of: 10    latrice.       - NC Quantum Martin Balloon 2.0x15. 3 inflation(s) to a max pressure of:    20 latrice.       Lesion on Prox LAD: Proximal subsection. 80% stenosis 12 mm length reduced    to 0%. Pre procedure VERENICE III flow was noted. Post Procedure VERENICE III    flow was present. Good runoff was present. The lesion was diagnosed as    Moderate Risk (B).      Devices used       - Mini Trek Balloon 2.0mm x 12mm. 1 inflation(s) to a max pressure of:    12 latrice.       - Xience Chary 2.75 x 18 PO. 1 inflation(s) to a max pressure of: 14    latrice.       - NC Quantum Martin Balloon 3.0x15. 1 inflation(s) to a max pressure of:    14 latrice.     LCx: 90% distal stenosis reduced to 0% using 2.5x28 mm Xience and post  dilated using 2.75 mm NC balloon. 70% mid stenosis reduced to 0% using 3x38  mm Xience stent and post dilated using 3 mm NC balloon.       Lesion on Mid CX: Mid subsection. 70% stenosis 15 mm length reduced to 0%.    Pre procedure VERENICE III flow was noted. Post Procedure VERENICE III flow was    present. Good runoff was present. The lesion was diagnosed as Moderate    Risk (B).      Devices used       - Runthrough NS. Number of passes: 1.       - Mini Trek Balloon 2.0mm x 15mm. 3 inflation(s) to a max pressure of:    12 latrice.       - Xience Chary 2.5 x 28 PO.  1 inflation(s) to a max pressure of: 10    latrice.     [address]

## 2020-05-28 ENCOUNTER — HOSPITAL ENCOUNTER (OUTPATIENT)
Dept: PREADMISSION TESTING | Age: 75
Setting detail: SPECIMEN
Discharge: HOME | End: 2020-06-01
Payer: MEDICARE

## 2020-05-28 PROCEDURE — U0004 COV-19 TEST NON-CDC HGH THRU: HCPCS

## 2020-05-29 LAB
SARS-COV-2, PCR: NOT DETECTED
SARS-COV-2, RAPID: NORMAL
SARS-COV-2: NORMAL
SOURCE: NORMAL

## 2020-05-29 NOTE — PROGRESS NOTES
Spoke with Delta Rios about restrictions/no visitor policy reviewed, asked to bring medications with patient and drop off location. Informed of medications to take in am.  Questions answered and phone numbers exchanged.

## 2020-05-30 ENCOUNTER — TELEPHONE (OUTPATIENT)
Dept: PRIMARY CARE CLINIC | Age: 75
End: 2020-05-30

## 2020-06-01 ENCOUNTER — APPOINTMENT (OUTPATIENT)
Dept: GENERAL RADIOLOGY | Age: 75
End: 2020-06-01
Attending: INTERNAL MEDICINE
Payer: MEDICARE

## 2020-06-01 ENCOUNTER — HOSPITAL ENCOUNTER (OUTPATIENT)
Dept: CARDIAC CATH/INVASIVE PROCEDURES | Age: 75
Discharge: HOME | End: 2020-06-02
Attending: INTERNAL MEDICINE | Admitting: INTERNAL MEDICINE
Payer: MEDICARE

## 2020-06-01 PROBLEM — Z95.810 S/P ICD (INTERNAL CARDIAC DEFIBRILLATOR) PROCEDURE: Status: ACTIVE | Noted: 2020-06-01

## 2020-06-01 LAB
GFR NON-AFRICAN AMERICAN: 42 ML/MIN
GFR SERPL CREATININE-BSD FRML MDRD: 51 ML/MIN
GFR SERPL CREATININE-BSD FRML MDRD: ABNORMAL ML/MIN/{1.73_M2}
GLUCOSE BLD-MCNC: 143 MG/DL (ref 75–110)
GLUCOSE BLD-MCNC: 65 MG/DL (ref 75–110)
GLUCOSE BLD-MCNC: 92 MG/DL (ref 74–100)
POC CHLORIDE: 107 MMOL/L (ref 98–107)
POC CREATININE: 1.61 MG/DL (ref 0.51–1.19)
POC HEMATOCRIT: 41 % (ref 41–53)
POC HEMOGLOBIN: 13.8 G/DL (ref 13.5–17.5)
POC POTASSIUM: 3.8 MMOL/L (ref 3.5–4.5)
POC SODIUM: 145 MMOL/L (ref 138–146)

## 2020-06-01 PROCEDURE — 2500000003 HC RX 250 WO HCPCS: Performed by: STUDENT IN AN ORGANIZED HEALTH CARE EDUCATION/TRAINING PROGRAM

## 2020-06-01 PROCEDURE — C1751 CATH, INF, PER/CENT/MIDLINE: HCPCS

## 2020-06-01 PROCEDURE — 2580000003 HC RX 258

## 2020-06-01 PROCEDURE — 6360000004 HC RX CONTRAST MEDICATION

## 2020-06-01 PROCEDURE — C1769 GUIDE WIRE: HCPCS

## 2020-06-01 PROCEDURE — 6370000000 HC RX 637 (ALT 250 FOR IP): Performed by: STUDENT IN AN ORGANIZED HEALTH CARE EDUCATION/TRAINING PROGRAM

## 2020-06-01 PROCEDURE — 2709999900 HC NON-CHARGEABLE SUPPLY

## 2020-06-01 PROCEDURE — 7100000001 HC PACU RECOVERY - ADDTL 15 MIN

## 2020-06-01 PROCEDURE — 82565 ASSAY OF CREATININE: CPT

## 2020-06-01 PROCEDURE — 33249 INSJ/RPLCMT DEFIB W/LEAD(S): CPT | Performed by: INTERNAL MEDICINE

## 2020-06-01 PROCEDURE — 2720000010 HC SURG SUPPLY STERILE

## 2020-06-01 PROCEDURE — 82435 ASSAY OF BLOOD CHLORIDE: CPT

## 2020-06-01 PROCEDURE — 33225 L VENTRIC PACING LEAD ADD-ON: CPT | Performed by: INTERNAL MEDICINE

## 2020-06-01 PROCEDURE — 6360000002 HC RX W HCPCS: Performed by: STUDENT IN AN ORGANIZED HEALTH CARE EDUCATION/TRAINING PROGRAM

## 2020-06-01 PROCEDURE — 33264 RMVL & RPLCMT DFB GEN MLT LD: CPT | Performed by: INTERNAL MEDICINE

## 2020-06-01 PROCEDURE — C1882 AICD, OTHER THAN SING/DUAL: HCPCS

## 2020-06-01 PROCEDURE — 71045 X-RAY EXAM CHEST 1 VIEW: CPT

## 2020-06-01 PROCEDURE — C1894 INTRO/SHEATH, NON-LASER: HCPCS

## 2020-06-01 PROCEDURE — C1900 LEAD, CORONARY VENOUS: HCPCS

## 2020-06-01 PROCEDURE — 84295 ASSAY OF SERUM SODIUM: CPT

## 2020-06-01 PROCEDURE — 7100000000 HC PACU RECOVERY - FIRST 15 MIN

## 2020-06-01 PROCEDURE — 85014 HEMATOCRIT: CPT

## 2020-06-01 PROCEDURE — 82947 ASSAY GLUCOSE BLOOD QUANT: CPT

## 2020-06-01 PROCEDURE — 6370000000 HC RX 637 (ALT 250 FOR IP)

## 2020-06-01 PROCEDURE — 33241 REMOVE PULSE GENERATOR: CPT | Performed by: INTERNAL MEDICINE

## 2020-06-01 PROCEDURE — 2580000003 HC RX 258: Performed by: STUDENT IN AN ORGANIZED HEALTH CARE EDUCATION/TRAINING PROGRAM

## 2020-06-01 PROCEDURE — 2500000003 HC RX 250 WO HCPCS

## 2020-06-01 PROCEDURE — C1887 CATHETER, GUIDING: HCPCS

## 2020-06-01 PROCEDURE — 6360000002 HC RX W HCPCS

## 2020-06-01 PROCEDURE — C1898 LEAD, PMKR, OTHER THAN TRANS: HCPCS

## 2020-06-01 PROCEDURE — 84132 ASSAY OF SERUM POTASSIUM: CPT

## 2020-06-01 RX ORDER — CLOPIDOGREL BISULFATE 75 MG/1
75 TABLET ORAL DAILY
Status: DISCONTINUED | OUTPATIENT
Start: 2020-06-01 | End: 2020-06-02 | Stop reason: HOSPADM

## 2020-06-01 RX ORDER — GLIMEPIRIDE 2 MG/1
1 TABLET ORAL 2 TIMES DAILY
Status: DISCONTINUED | OUTPATIENT
Start: 2020-06-01 | End: 2020-06-02 | Stop reason: HOSPADM

## 2020-06-01 RX ORDER — SODIUM CHLORIDE 9 MG/ML
INJECTION, SOLUTION INTRAVENOUS CONTINUOUS
Status: ACTIVE | OUTPATIENT
Start: 2020-06-01 | End: 2020-06-01

## 2020-06-01 RX ORDER — DEXTROSE MONOHYDRATE 25 G/50ML
12.5 INJECTION, SOLUTION INTRAVENOUS PRN
Status: DISCONTINUED | OUTPATIENT
Start: 2020-06-01 | End: 2020-06-02 | Stop reason: HOSPADM

## 2020-06-01 RX ORDER — ASPIRIN 81 MG/1
81 TABLET, CHEWABLE ORAL DAILY
Status: DISCONTINUED | OUTPATIENT
Start: 2020-06-01 | End: 2020-06-02 | Stop reason: HOSPADM

## 2020-06-01 RX ORDER — SODIUM CHLORIDE 0.9 % (FLUSH) 0.9 %
10 SYRINGE (ML) INJECTION PRN
Status: DISCONTINUED | OUTPATIENT
Start: 2020-06-01 | End: 2020-06-02 | Stop reason: HOSPADM

## 2020-06-01 RX ORDER — SODIUM CHLORIDE 0.9 % (FLUSH) 0.9 %
10 SYRINGE (ML) INJECTION EVERY 12 HOURS SCHEDULED
Status: DISCONTINUED | OUTPATIENT
Start: 2020-06-01 | End: 2020-06-02 | Stop reason: HOSPADM

## 2020-06-01 RX ORDER — HYDROCODONE BITARTRATE AND ACETAMINOPHEN 5; 325 MG/1; MG/1
1 TABLET ORAL EVERY 6 HOURS PRN
Status: DISCONTINUED | OUTPATIENT
Start: 2020-06-01 | End: 2020-06-02 | Stop reason: HOSPADM

## 2020-06-01 RX ORDER — FOLIC ACID 1 MG/1
1 TABLET ORAL DAILY
Status: DISCONTINUED | OUTPATIENT
Start: 2020-06-01 | End: 2020-06-02 | Stop reason: HOSPADM

## 2020-06-01 RX ORDER — ATORVASTATIN CALCIUM 80 MG/1
80 TABLET, FILM COATED ORAL NIGHTLY
Status: DISCONTINUED | OUTPATIENT
Start: 2020-06-01 | End: 2020-06-02 | Stop reason: HOSPADM

## 2020-06-01 RX ORDER — GABAPENTIN 300 MG/1
300 CAPSULE ORAL 3 TIMES DAILY
Status: DISCONTINUED | OUTPATIENT
Start: 2020-06-01 | End: 2020-06-02 | Stop reason: HOSPADM

## 2020-06-01 RX ORDER — POTASSIUM CHLORIDE 20 MEQ/1
20 TABLET, EXTENDED RELEASE ORAL 2 TIMES DAILY
Status: DISCONTINUED | OUTPATIENT
Start: 2020-06-01 | End: 2020-06-02 | Stop reason: HOSPADM

## 2020-06-01 RX ORDER — ACETAMINOPHEN 325 MG/1
650 TABLET ORAL EVERY 4 HOURS PRN
Status: DISCONTINUED | OUTPATIENT
Start: 2020-06-01 | End: 2020-06-02 | Stop reason: HOSPADM

## 2020-06-01 RX ORDER — SODIUM CHLORIDE 9 MG/ML
INJECTION, SOLUTION INTRAVENOUS CONTINUOUS
Status: DISCONTINUED | OUTPATIENT
Start: 2020-06-01 | End: 2020-06-01

## 2020-06-01 RX ORDER — CITALOPRAM 20 MG/1
40 TABLET ORAL DAILY
Status: DISCONTINUED | OUTPATIENT
Start: 2020-06-01 | End: 2020-06-02 | Stop reason: HOSPADM

## 2020-06-01 RX ORDER — NITROGLYCERIN 0.4 MG/1
0.4 TABLET SUBLINGUAL EVERY 5 MIN PRN
Status: DISCONTINUED | OUTPATIENT
Start: 2020-06-01 | End: 2020-06-02 | Stop reason: HOSPADM

## 2020-06-01 RX ORDER — NICOTINE POLACRILEX 4 MG
15 LOZENGE BUCCAL PRN
Status: DISCONTINUED | OUTPATIENT
Start: 2020-06-01 | End: 2020-06-02 | Stop reason: HOSPADM

## 2020-06-01 RX ORDER — DEXTROSE MONOHYDRATE 50 MG/ML
100 INJECTION, SOLUTION INTRAVENOUS PRN
Status: DISCONTINUED | OUTPATIENT
Start: 2020-06-01 | End: 2020-06-02 | Stop reason: HOSPADM

## 2020-06-01 RX ORDER — CEFAZOLIN SODIUM 1 G/50ML
1 INJECTION, SOLUTION INTRAVENOUS
Status: COMPLETED | OUTPATIENT
Start: 2020-06-01 | End: 2020-06-01

## 2020-06-01 RX ORDER — BUMETANIDE 1 MG/1
2 TABLET ORAL 2 TIMES DAILY
Status: DISCONTINUED | OUTPATIENT
Start: 2020-06-01 | End: 2020-06-02 | Stop reason: HOSPADM

## 2020-06-01 RX ADMIN — FOLIC ACID 1 MG: 1 TABLET ORAL at 17:12

## 2020-06-01 RX ADMIN — HYDROCODONE BITARTRATE AND ACETAMINOPHEN 1 TABLET: 5; 325 TABLET ORAL at 14:22

## 2020-06-01 RX ADMIN — POTASSIUM CHLORIDE 20 MEQ: 1500 TABLET, EXTENDED RELEASE ORAL at 19:42

## 2020-06-01 RX ADMIN — GABAPENTIN 300 MG: 300 CAPSULE ORAL at 23:19

## 2020-06-01 RX ADMIN — SODIUM CHLORIDE: 9 INJECTION, SOLUTION INTRAVENOUS at 09:19

## 2020-06-01 RX ADMIN — CITALOPRAM 40 MG: 20 TABLET, FILM COATED ORAL at 17:12

## 2020-06-01 RX ADMIN — SACUBITRIL AND VALSARTAN 1 TABLET: 49; 51 TABLET, FILM COATED ORAL at 19:43

## 2020-06-01 RX ADMIN — SODIUM CHLORIDE 50000 UNITS: 9 INJECTION, SOLUTION INTRAVENOUS at 16:13

## 2020-06-01 RX ADMIN — ATORVASTATIN CALCIUM 80 MG: 80 TABLET, FILM COATED ORAL at 19:42

## 2020-06-01 RX ADMIN — CEFAZOLIN SODIUM 1 G: 1 INJECTION, SOLUTION INTRAVENOUS at 16:14

## 2020-06-01 RX ADMIN — GLIMEPIRIDE 1 MG: 2 TABLET ORAL at 17:12

## 2020-06-01 RX ADMIN — CLOPIDOGREL 75 MG: 75 TABLET, FILM COATED ORAL at 17:12

## 2020-06-01 RX ADMIN — HYDROCODONE BITARTRATE AND ACETAMINOPHEN 1 TABLET: 5; 325 TABLET ORAL at 19:41

## 2020-06-01 RX ADMIN — SODIUM CHLORIDE: 9 INJECTION, SOLUTION INTRAVENOUS at 16:13

## 2020-06-01 RX ADMIN — BUMETANIDE 2 MG: 1 TABLET ORAL at 19:42

## 2020-06-01 RX ADMIN — ASPIRIN 81 MG: 81 TABLET, CHEWABLE ORAL at 17:12

## 2020-06-01 RX ADMIN — GABAPENTIN 300 MG: 300 CAPSULE ORAL at 17:12

## 2020-06-01 ASSESSMENT — PAIN SCALES - GENERAL
PAINLEVEL_OUTOF10: 4
PAINLEVEL_OUTOF10: 8
PAINLEVEL_OUTOF10: 8

## 2020-06-01 NOTE — OP NOTE
[facility]                Procedure Note  BIV- ICD        Kolby Carey (89 y.o., male)  1945 6/1/2020      Procedure: AICD upgrade to Bi V-ICD    Operators:  Primary: Cecilia Palm MD  Assistant/ CV Fellow: Dillan Cruz MD    Indication:      Pre Procedure Conscious Sedation Data:    ASA Class:    [] I [] II [x] III [] IV    Mallampati Class:  [] I [] II [x] III [] IV     AICD / CRT Indication: Pre procedure review    Documentation to support the medical necessity f procedure. Reason for placement:     [] Initial [] Recall/Malfunction  [x] Upgrade  [] Beyond useful life limit    EF measured by:   [x] Echo [] Stress Test  [] Muga  [] Angiography    For Secondary prevention - Complete this section. A.   [] Documented episodes of cardiac arrest due to V.Fib, not due to transient reversible causes, with no irreversible brain damage   (NCD Covered indication I)  [] Documented sustained VT, either spontaneous or induced by EP study, not associated with an acute MI and notdue to a transient or reversible cause; and patient does not have irreversible brain damage from pre existing cerebral disease. (NCD Covered Insication 2). Docment as  []  Ischemic. [] Non Ischemic. For Primary prevention Patients - Complete Section B & C. Section B.  [] Documented familial or inherited conditions with a high risk of life threatening VT (Long QT, HOC). (Cpovered indication 3)  [] Coronary artery disease with documented prior MI annd LVEF < 35%, and inducivble sustained VT or VF at EP study. (NCD Covered Indication 4). [x] Documented prior MI and LVEF < 30%. (NCD Covered Indication 5)  [] Ischemic dilated CM (IDCM), documented prior MI, NYHA class II/III heart failure, and EF < 35%. (NCD Covered Indication 6). [] NIDCM > 3 months, NYHA class II/III heart failure, and LVEF < 35%   (NCD Covered Indication 7 & 9).   [] Documented prior MI and NYHA class IV heart failure and meets all current medicare coverage for CRT device. Section C. (if one or more box is checked, then patient does not meet NCD coverage requirements for primary prevention)  [] NYHA class IV (Covered Indications 4 and 8)  [] Cardiogenic shock or symptomatic hypotension while in a stable baseline rhythm. [] CABG or PTCA within past 3 months  [] Acute MI within past 40 days. [] Patient is unable to give informed consent. [] Symptoms / Findings making them a candidate for coronary revascularization. [] Irreversible brain damage from pre existing cerebral disease.  [] Any disease, other than cardiac disease, with likely survival less than 1 year.  / Device Data:        Name    [employer] X          Model # Serial #   ICD- Bi-V [id number]   RA-Lead [id number]   RV-Lead [id number]   LV-Lead [id number]       ATRIUM R VENT L VENT   P waves 1.6 mV R waves: 5 mV R waves: 8 mV   Threshold:0.7  V  At O.4ms Threshold: 0.5 V at 0.4 ms Threshold: 0.5 V at 0.4 ms   Impedance: 639 Impedance:532 Impedance:893       · [x] Sedation monitoring  · [] Left Subclavian Angiogram   · [x] RV lead   · [x] RA lead   · [x] LV lead   · [x] Intra - OP Lead Testing  · [x] Coronary Sinus Angiogram   · [x] Pocket   · [x] Generators Implant  · [x] Fluoro time:3 min      Procedure  After the usual preparation of the left neck and chest, the patient was draped in the usual sterile manner. Local anesthesia was infused below the left clavicle from the midline laterally. An incision was made inferior and parallel to the clavicle. The incision was carried down to the fascia. A pocket was formed inferior using blunt dissection. A thin walled 18 gauge needle was used to puncture the left subclavian vein using the modified Seldinger technique. A guide wire was passed into the right heart under fluoroscopic control. This was repeated with a second guide wire.      RV Lead Implant:  A 9 English sheath was then passed over the guide wire and the guide wire removed. The ventricular lead was advanced through the sheath into the right heart. The lead was then positioned in the right ventricle under fluoroscopic control. The acute pacing and sensing thresholds were measured and found to be satisfactory. LV Lead Implant:  A 9.0 Burkinan sheath was then passed over the guide wire, the guide wire (Luge Wire), was unable to be removed completely, the part remaining in the lead was left alone and the external part was removed. A multi purpose guiding catheter was introduced and engaged with the coronary sinus. A     balloon tip catheter was used to perform coronary sinus angiogram and identify the lateral baranch. The left ventricular lead was advanced through the sheath into the lateral branch and positioned in the best position under fluoroscopic control, with multiple testing performed in that position . The acute pacing and sensing thresholds were measured and found to be satisfactory. RA Lead Implant:  A second 7.0 Burkinan sheath was then passed over the guide wire and the guide wire removed. The atrial lead was advanced into the right heart. The lead was then positioned in the right atrium. The acute pacing and sensing thresholds were measured and found to be acceptable. The two sheaths were removed and the leads were secured to the fascia. Generator: The implanted leads were attached to the pacemaker / AICD using the setscrews. The pocket was irrigated with antibiotic solution. The pulse generator and leads were coiled and placed in the pocket. Fluoroscopy was used to verify the final placement of the pacemaker and leads. The pocket was closed using multiple layers of suture and a dry sterile dressing was applied. There were no complications, patient tolerated the procedure well. The patient left the EP lab in stable condition.       Estimated Blood Loss:  [x] Minimal < 25 cc [] Moderate 25-50 cc  [] >50 cc      Impression / Device:  Successful upgrade of AICD to BIV- ICD  Part of the Luge wire was left in the lead as it could not be retracted. Plan:  Telemetry monitoring  Interigate pacemaker before discharge  CXR if needed  Wound check at [facility] in 7days  Discharge if patient remains stable        Electronically signed by Betsey Delong MD on 6/1/2020 at 12:46 PM  [facility]  [phone]          I have reviewed the case / procedure with resident / fellow  I have examined the patient personally  Patient agree with treatment plan, correction innotes was made as appropriate, and discussed final arrangement based on  my evaluation and exam.    Risk and benefit of procedure if planned were explained in details. Procedure was performed by me, with all aspect of the procedure being done using standard protocol. Note was modified based on my own assessment and treatment.     Patricia Looney MD  [facility]

## 2020-06-01 NOTE — H&P
[facility] Cardiology Cardiology    Consult / H&P               Today's Date: 6/1/2020  Patient Name: Reena Saravia  Date of admission: 6/1/2020  8:43 AM  Patient's age: 75 y. o., 1945  Admission Dx: SOB (shortness of breath) [R06.02]    Reason for Admission / Consult:  AICD upgrade to BiV-ICD    Requesting Physician: No admitting provider for patient encounter. CHIEF COMPLAINT: Shortness of breath on minimal exertion    History Obtained From:  patient, electronic medical record    HISTORY OF PRESENT ILLNESS:      The patient is a 75 y.o.  male who with history of ischemic cardiomyopathy, diabetes and hypertension who presents for elective AICD upgrade to BiV AICD. Patient reported shortness of breath on minimal exertion during his last clinic appointment, and decision was made to upgrade his AICD to BI-V AICD. Past Medical History:   has a past medical history of Arthritis, CAD (coronary artery disease), Clinical trial participant at discharge, Diabetes mellitus ([facility] [address] Former smoker, H/O ventricular tachycardia, Hyperlipidemia, and Hypertension. Past Surgical History:   has a past surgical history that includes Cardiac catheterization; Coronary angioplasty; Cardiac defibrillator placement (07/11/2016); Coronary angioplasty (02/12/2020); and Cardiac defibrillator placement (06/01/2020). Home Medications:    Prior to Admission medications    Medication Sig Start Date End Date Taking? Authorizing Provider   gabapentin (NEURONTIN) 300 MG capsule Take 300 mg by mouth 3 times daily.    Yes Historical Provider, MD   citalopram (CELEXA) 40 MG tablet Take 40 mg by mouth daily   Yes Historical Provider, MD   sacubitril-valsartan (ENTRESTO) 49-51 MG per tablet Take 1 tablet by mouth 2 times daily   Yes Historical Provider, MD   bumetanide (BUMEX) 2 MG tablet Take 2 mg by mouth 2 times daily   Yes Historical Provider, MD   potassium chloride (KLOR-CON M) 20 MEQ extended release tablet Take 20 mEq by mouth 2 times daily   Yes Historical Provider, MD   zolpidem (AMBIEN) 10 MG tablet Take by mouth nightly as needed for Sleep. Yes Historical Provider, MD   atorvastatin (LIPITOR) 80 MG tablet Take 1 tablet by mouth nightly 1/23/17  Yes Johnny Turner,    glimepiride (AMARYL) 1 MG tablet Take 1 mg by mouth 2 times daily    Yes Historical Provider, MD   Omega-3 Fatty Acids (FISH OIL PO) Take 1 tablet by mouth 2 times daily   Yes Historical Provider, MD   metFORMIN (GLUCOPHAGE) 1000 MG tablet Take 1 tablet by mouth 2 times daily (with meals)  Patient taking differently: Take 500 mg by mouth 2 times daily (with meals)  4/7/16  Yes SHANDRA Munroe CNP   aspirin 81 MG chewable tablet Take 81 mg by mouth daily   Yes Historical Provider, MD   folic acid (FOLVITE) 1 MG tablet Take 1 mg by mouth daily   Yes Historical Provider, MD   clopidogrel (PLAVIX) 75 MG tablet Take 75 mg by mouth daily   Yes Historical Provider, MD   HYDROcodone-acetaminophen (NORCO) 5-325 MG per tablet Take 1 tablet by mouth every 6 hours as needed for Pain   Yes Historical Provider, MD   nitroGLYCERIN (NITROSTAT) 0.4 MG SL tablet Place 1 tablet under the tongue every 5 minutes as needed for Chest pain up to max of 3 total doses. If no relief after 1 dose, call 911. 3/28/17   SHANDRA Kimble CNP        Current Facility-Administered Medications: bacitracin 50,000 Units in sodium chloride 0.9 % 1,000 mL irrigation, 50,000 Units, Topical, Once  0.9 % sodium chloride infusion, , Intravenous, Continuous  ceFAZolin (ANCEF) 1 g in dextrose 5 % 50 mL IVPB (premix), 1 g, Intravenous, On Call to OR    Allergies:  Patient has no known allergies. Social History:   reports that he has quit smoking. He has never used smokeless tobacco. He reports previous alcohol use of about 1.0 standard drinks of alcohol per week. He reports that he does not use drugs. Family History: family history includes Heart Failure in his mother.  No h/o sudden cardiac death. No for premature CAD    REVIEW OF SYSTEMS:    · Constitutional: there has been no unanticipated weight loss. There's been No change in energy level, No change in activity level. · Eyes: No visual changes or diplopia. No scleral icterus. · ENT: No Headaches  · Cardiovascular: Remaining as above  · Respiratory: No previous pulmonary problems, No cough  · Gastrointestinal: No abdominal pain. No change in bowel or bladder habits. · Genitourinary: No dysuria, trouble voiding, or hematuria. · Musculoskeletal:  No gait disturbance, No weakness or joint complaints. · Integumentary: No rash or pruritis. · Neurological: No headache, diplopia, change in muscle strength, numbness or tingling. No change in gait, balance, coordination, mood, affect, memory, mentation, behavior. · Psychiatric: No anxiety, or depression. · Endocrine: No temperature intolerance. No excessive thirst, fluid intake, or urination. No tremor. · Hematologic/Lymphatic: No abnormal bruising or bleeding, blood clots or swollen lymph nodes. · Allergic/Immunologic: No nasal congestion or hives. PHYSICAL EXAM:      BP (!) 151/79   Pulse 89   Temp 97.2 °F (36.2 °C) (Oral)   Resp 16   Ht 5' 8.5\" (1.74 m)   Wt 226 lb (102.5 kg)   SpO2 97%   BMI 33.86 kg/m²    No intake or output data in the 24 hours ending 06/01/20 0916      Constitutional and General Appearance: alert, cooperative, no distress and appears stated age  HEENT: PERRL, no cervical lymphadenopathy. No masses palpable. Normal oral mucosa  Respiratory:  · Normal excursion and expansion without use of accessory muscles  · Resp Auscultation: Good respiratory effort. No for increased work of breathing.  On auscultation: clear to auscultation bilaterally  Cardiovascular:  · The apical impulse is not displaced  · Heart tones are crisp and normal. regular S1 and S2.  · Jugular venous pulsation Normal  · The carotid upstroke is normal in amplitude and contour without delay for AICD upgrade to [address] ICD    2. CAD: With angina class: I-II, S/p multiple stents, including RCA, OM1 and LAD stent       3. Ischemic Cardiomyopathy with EF 35% on last echo. NYHA class II-III    4. S/P ICD with history of VT s/p ablation (Medtronic Evera XT VR) IN 2016      RECOMMENDATIONS:  1. Proceed with AICD upgrade to BiV - ICD  2. Further recommendations postprocedure        Risk, benefits and alternatives of the procedure were discussed in detail. Risk of bleeding, requiring blood transfusion, vascular complication requiring surgery, renal insufficieny with need of dialysis, CVA, MI, death and anesthesia complications including intubation were discussed. Patient agrees to proceed and verbalizes understanding. Discussed with patient and Nurse. Julia West M.D. Fellow, [address]        Attestation signed by      Attending Physician Statement:    I have discussed the care of  Rayshawn Rush , including pertinent history and exam findings, with the Cardiology fellow/resident. I have seen and examined the patient and the key elements of all parts of the encounter have been performed by me. I agree with the assessment, plan and orders as documented by the fellow/resident, after I modified exam findings and plan of treatments, and the final version is my approved version of the assessment.      Additional Comments:

## 2020-06-01 NOTE — PROGRESS NOTES
Patient admitted, consent signed, all questions answered. Pt ready for procedure. Call light to reach with side rails up 2 of 2. Lt chest clipped per [facility]. No one at bedside with patient.     Prep completed to Lt upper chest area with 2% Chlorhexidine Gluconate

## 2020-06-01 NOTE — PROGRESS NOTES
Pt transported to room 322. Assisted into bed. States \"pain pill is starting to help\".   Care assumed per Dali Enrique RN

## 2020-06-01 NOTE — PROGRESS NOTES
Writer returned from lunch and report received from [place]. No further drainage noted on dressing. Safeguard remains in place. Sling applied to Lt arm.

## 2020-06-01 NOTE — PROGRESS NOTES
Received post ICD upgrade procedure to [facility] room 10. Assessment obtained. Restrictions reviewed with patient. Post procedure pathway initiated. Left chest site soft , Dressing oozing, safeguard place by cath lab staff   No hematoma noted.      Patient given boxed lunch

## 2020-06-02 ENCOUNTER — APPOINTMENT (OUTPATIENT)
Dept: GENERAL RADIOLOGY | Age: 75
End: 2020-06-02
Attending: INTERNAL MEDICINE
Payer: MEDICARE

## 2020-06-02 VITALS
WEIGHT: 226 LBS | TEMPERATURE: 97.2 F | RESPIRATION RATE: 18 BRPM | OXYGEN SATURATION: 97 % | HEIGHT: 69 IN | DIASTOLIC BLOOD PRESSURE: 84 MMHG | HEART RATE: 82 BPM | BODY MASS INDEX: 33.47 KG/M2 | SYSTOLIC BLOOD PRESSURE: 118 MMHG

## 2020-06-02 LAB
GLUCOSE BLD-MCNC: 118 MG/DL (ref 75–110)
GLUCOSE BLD-MCNC: 178 MG/DL (ref 75–110)
HCT VFR BLD CALC: 39 % (ref 40.7–50.3)
HEMOGLOBIN: 12.8 G/DL (ref 13–17)
MCH RBC QN AUTO: 31.7 PG (ref 25.2–33.5)
MCHC RBC AUTO-ENTMCNC: 32.8 G/DL (ref 28.4–34.8)
MCV RBC AUTO: 96.5 FL (ref 82.6–102.9)
NRBC AUTOMATED: 0 PER 100 WBC
PDW BLD-RTO: 13.3 % (ref 11.8–14.4)
PLATELET # BLD: 158 K/UL (ref 138–453)
PMV BLD AUTO: 11 FL (ref 8.1–13.5)
RBC # BLD: 4.04 M/UL (ref 4.21–5.77)
WBC # BLD: 6.4 K/UL (ref 3.5–11.3)

## 2020-06-02 PROCEDURE — 2580000003 HC RX 258: Performed by: STUDENT IN AN ORGANIZED HEALTH CARE EDUCATION/TRAINING PROGRAM

## 2020-06-02 PROCEDURE — 71045 X-RAY EXAM CHEST 1 VIEW: CPT

## 2020-06-02 PROCEDURE — 6370000000 HC RX 637 (ALT 250 FOR IP): Performed by: STUDENT IN AN ORGANIZED HEALTH CARE EDUCATION/TRAINING PROGRAM

## 2020-06-02 PROCEDURE — 85027 COMPLETE CBC AUTOMATED: CPT

## 2020-06-02 PROCEDURE — 82947 ASSAY GLUCOSE BLOOD QUANT: CPT

## 2020-06-02 PROCEDURE — 36415 COLL VENOUS BLD VENIPUNCTURE: CPT

## 2020-06-02 RX ADMIN — HYDROCODONE BITARTRATE AND ACETAMINOPHEN 1 TABLET: 5; 325 TABLET ORAL at 09:22

## 2020-06-02 RX ADMIN — POTASSIUM CHLORIDE 20 MEQ: 1500 TABLET, EXTENDED RELEASE ORAL at 09:18

## 2020-06-02 RX ADMIN — BUMETANIDE 2 MG: 1 TABLET ORAL at 09:18

## 2020-06-02 RX ADMIN — CITALOPRAM 40 MG: 20 TABLET, FILM COATED ORAL at 09:18

## 2020-06-02 RX ADMIN — HYDROCODONE BITARTRATE AND ACETAMINOPHEN 1 TABLET: 5; 325 TABLET ORAL at 01:27

## 2020-06-02 RX ADMIN — SACUBITRIL AND VALSARTAN 1 TABLET: 49; 51 TABLET, FILM COATED ORAL at 10:09

## 2020-06-02 RX ADMIN — FOLIC ACID 1 MG: 1 TABLET ORAL at 09:18

## 2020-06-02 RX ADMIN — GABAPENTIN 300 MG: 300 CAPSULE ORAL at 09:18

## 2020-06-02 RX ADMIN — GLIMEPIRIDE 1 MG: 2 TABLET ORAL at 09:18

## 2020-06-02 RX ADMIN — Medication 10 ML: at 09:18

## 2020-06-02 RX ADMIN — SODIUM CHLORIDE, PRESERVATIVE FREE 10 ML: 5 INJECTION INTRAVENOUS at 10:09

## 2020-06-02 ASSESSMENT — PAIN DESCRIPTION - DESCRIPTORS: DESCRIPTORS: ACHING;CONSTANT

## 2020-06-02 ASSESSMENT — PAIN DESCRIPTION - ORIENTATION: ORIENTATION: LEFT

## 2020-06-02 ASSESSMENT — PAIN DESCRIPTION - PAIN TYPE: TYPE: ACUTE PAIN

## 2020-06-02 ASSESSMENT — PAIN DESCRIPTION - LOCATION: LOCATION: CHEST

## 2020-06-02 ASSESSMENT — PAIN SCALES - GENERAL
PAINLEVEL_OUTOF10: 6
PAINLEVEL_OUTOF10: 8

## 2020-06-02 ASSESSMENT — PAIN DESCRIPTION - ONSET: ONSET: ON-GOING

## 2020-06-02 ASSESSMENT — PAIN DESCRIPTION - PROGRESSION: CLINICAL_PROGRESSION: NOT CHANGED

## 2020-06-02 ASSESSMENT — PAIN DESCRIPTION - FREQUENCY: FREQUENCY: CONTINUOUS

## 2020-06-02 NOTE — FLOWSHEET NOTE
Patient reports that he has constant ringing in his ears and that he normally takes sleeping pills so that he does not hear the ringing. He thinks the constant ringing is from the mulitple medications that he is on.

## 2020-06-02 NOTE — PLAN OF CARE
Problem: Anxiety:  Goal: Level of anxiety will decrease  Description: Level of anxiety will decrease  6/2/2020 0417 by Rigoberto Liu RN  Outcome: Ongoing  6/1/2020 1621 by Lyubov Reyes RN  Outcome: Ongoing  6/1/2020 1619 by Lyubov Reyes RN  Outcome: Ongoing  6/1/2020 1619 by Lyubov Reyes RN  Outcome: Ongoing     Problem: Pain:  Goal: Pain level will decrease  Description: Pain level will decrease  6/2/2020 0417 by Rigoberto Liu RN  Outcome: Ongoing  6/1/2020 1621 by Lyubov Reyes RN  Outcome: Ongoing  Goal: Control of acute pain  Description: Control of acute pain  6/2/2020 0417 by Rigoberto Liu RN  Outcome: Ongoing  6/1/2020 1621 by Lyubov Reyes RN  Outcome: Ongoing  Goal: Control of chronic pain  Description: Control of chronic pain  6/2/2020 0417 by Rigoberto Liu RN  Outcome: Ongoing  6/1/2020 1621 by Lyubov Reyes RN  Outcome: Ongoing

## 2020-06-02 NOTE — DISCHARGE SUMMARY
[facility]  Discharge Note                 Name:  Ximena Mckeon  YOB: 1945  Social Security Number:  [id number]  Medical Record Number:  [id number]    Date of Admission:  6/1/2020  Date of Discharge:  6/2/2020    Admitting physician: Stevie River MD    Discharge Attending: Clarence Mahan CNP  Primary Care Physician: Madelyn Powers MD  Consultants: Cardiology  Discharge to Home stable condition    HOSPITAL ADMISSION PROBLEM LIST:  Patient Active Problem List   Diagnosis    AICD discharge    Syncope    Essential hypertension    Hyperlipidemia    Type 2 diabetes mellitus without complication, without long-term current use of insulin ([address]    CAD (coronary artery disease)    Arthritis    Hypocalcemia    Hypophosphatemia    V tach ([address]    H/O ventricular tachycardia    S/P angioplasty with stent    S/P ICD (internal cardiac defibrillator) procedure         Procedures:AICD upgrade to [address] :           The patient was admitted for: [place] Procedures if any: AICD upgrade to BiV-ICD  Medications changes recommendation: See medication list  Follow Up Plan: 1 week wound check       Discharge exam:   Vitals:    06/02/20 1219   BP: 118/84   Pulse: 82   Resp: 18   Temp: 97.2 °F (36.2 °C)   SpO2: 97%     Neuro: normal  Chest: Clear to ausculation. No wheezing. Cardiac: Regular rate. s1 and s2 auscultated. No murmur noted. Abdomen/groin: soft, non-tender, without masses or organomegaly  Lower extremity edema: none  Left chest AICD surgical site: incision with staples intact. Drainage noted and pressure dressing reapplied. Follow up with primary care provider 1 week  Follow up with cardiology 4 weeks  Follow up with other consultant physicians at their directions.     Discharge Medications:   [facility] Medication Instructions CTD:[id number]    Printed on:06/02/20 1302   Medication Information                      aspirin 81 MG chewable tablet  Take 81 mg by mouth daily             atorvastatin (LIPITOR) 80 MG tablet  Take 1 tablet by mouth nightly             bumetanide (BUMEX) 2 MG tablet  Take 2 mg by mouth 2 times daily             citalopram (CELEXA) 40 MG tablet  Take 40 mg by mouth daily             clopidogrel (PLAVIX) 75 MG tablet  Take 75 mg by mouth daily             folic acid (FOLVITE) 1 MG tablet  Take 1 mg by mouth daily             gabapentin (NEURONTIN) 300 MG capsule  Take 300 mg by mouth 3 times daily. glimepiride (AMARYL) 1 MG tablet  Take 1 mg by mouth 2 times daily              HYDROcodone-acetaminophen (NORCO) 5-325 MG per tablet  Take 1 tablet by mouth every 6 hours as needed for Pain             metFORMIN (GLUCOPHAGE) 1000 MG tablet  Take 1 tablet by mouth 2 times daily (with meals)             nitroGLYCERIN (NITROSTAT) 0.4 MG SL tablet  Place 1 tablet under the tongue every 5 minutes as needed for Chest pain up to max of 3 total doses. If no relief after 1 dose, call 911. Omega-3 Fatty Acids (FISH OIL PO)  Take 1 tablet by mouth 2 times daily             potassium chloride (KLOR-CON M) 20 MEQ extended release tablet  Take 20 mEq by mouth 2 times daily             sacubitril-valsartan (ENTRESTO) 49-51 MG per tablet  Take 1 tablet by mouth 2 times daily             zolpidem (AMBIEN) 10 MG tablet  Take by mouth nightly as needed for Sleep.                 AICD upgrage 6/1/2020   / Device Data:          Name     Medtronic X                                  Model # Serial #   ICD- Bi-V [place] [id number]   RA-Lead [id number]   RV-Lead [id number]   LV-Lead [id number]         ATRIUM R VENT L VENT   P waves 1.6 mV R waves: 5 mV R waves: 8 mV   Threshold:0.7  V  At O.4ms Threshold: 0.5 V at 0.4 ms Threshold: 0.5 V at 0.4 ms   Impedance: 639 Impedance:532 Impedance:893        Interrogation per [employer] pacer rep note:  ICD functioning appropriately at this time. Dr. Albert Redd notified with results. CXR    FINDINGS:   Left pacemaker placement with overlying skin staples.       No pneumothorax.       No focal consolidation.       Cardiomegaly.  Mild pulmonary edema. Patient seen and examined in room after discussion with RN. S/p AICD placed yesterday. Medtronic rep with note of  ICD functioning appropriately. CXR documenting no pneumothorax. Stapes intact. Drainage noted with pressure dressing reapplied. F/u wound care appointment scheduled. Discharge instructions regarding lifting limitations and wound care provided.     Electronically signed by SHANDRA Raymond NP on 6/2/2020 at 1:02 PM  [facility]      [phone]

## 2020-06-02 NOTE — FLOWSHEET NOTE
Pressure dressing remains intact to left chest.  Patient reports to writer that he had taken his Plavix up until the night before surgery. His last time to take Plavix was Javan, May 31 in the evening and his surgery was Monday June . He  Stated \"no body told me not take Plavix. \"

## 2020-06-02 NOTE — FLOWSHEET NOTE
Patient has safe guard in place to left chest incision. Old drainage noted on dressing. No new drainage noted.

## 2020-06-02 NOTE — CARE COORDINATION
Case Management Initial Discharge Plan  Maurizio Alas,         Met with:patient to discuss discharge plans. Information verified: address, contacts, phone number, , insurance Yes  Emergency Contact/Next of Kin name & number:   PCP: Mandie Forrester MD  Date of last visit: May 2020    Insurance Provider: Medicare- primary, Issac Garduno Medicare- secondary    Discharge Planning    Living Arrangements:  Spouse/Significant Other   Support Systems:  Spouse/Significant Other, Family Members    Home has 2 stories  2 stairs to climb to get into front door, one flight of stairs to climb to reach second floor  Location of bedroom/bathroom in home is on the main floor    Patient able to perform ADL's:Independent    Current Services (outpatient & in home) none  DME equipment: glucometer  DME provider: n/a      Potential Assistance Needed:  N/A    Patient agreeable to home care: No  Gates of choice provided:  n/a    Prior SNF/Rehab Placement and Facility: none  Agreeable to SNF/Rehab: No  Gates of choice provided: n/a   Evaluation: n/a    Expected Discharge date:  20  Patient expects to be discharged to:  Home  Follow Up Appointment: Best Day/ Time: Monday PM    Transportation provider: family  Transportation arrangements needed for discharge: No    Readmission Risk              Risk of Unplanned Readmission:        0             Does patient have a readmission risk score greater than 14?: No  If yes, follow-up appointment must be made within 7 days of discharge.      Goals of Care:       Discharge Plan: Home independently          Electronically signed by Jonathan Rand RN on 20 at 11:22 AM EDT

## 2020-12-27 ENCOUNTER — APPOINTMENT (OUTPATIENT)
Dept: GENERAL RADIOLOGY | Age: 75
DRG: 380 | End: 2020-12-27
Payer: MEDICARE

## 2020-12-27 ENCOUNTER — APPOINTMENT (OUTPATIENT)
Dept: CT IMAGING | Age: 75
DRG: 380 | End: 2020-12-27
Payer: MEDICARE

## 2020-12-27 ENCOUNTER — HOSPITAL ENCOUNTER (INPATIENT)
Age: 75
LOS: 3 days | Discharge: HOME | DRG: 380 | End: 2020-12-30
Attending: EMERGENCY MEDICINE | Admitting: INTERNAL MEDICINE
Payer: MEDICARE

## 2020-12-27 DIAGNOSIS — I95.89 HYPOTENSION DUE TO HYPOVOLEMIA: ICD-10-CM

## 2020-12-27 DIAGNOSIS — E86.1 HYPOTENSION DUE TO HYPOVOLEMIA: ICD-10-CM

## 2020-12-27 DIAGNOSIS — K92.0 GASTROINTESTINAL HEMORRHAGE WITH HEMATEMESIS: Primary | ICD-10-CM

## 2020-12-27 PROBLEM — K92.2 GI BLEED: Status: ACTIVE | Noted: 2020-12-27

## 2020-12-27 LAB
ABSOLUTE EOS #: 0 K/UL (ref 0–0.4)
ABSOLUTE IMMATURE GRANULOCYTE: 0.9 K/UL (ref 0–0.3)
ABSOLUTE LYMPH #: 5.18 K/UL (ref 1–4.8)
ABSOLUTE MONO #: 2.03 K/UL (ref 0.1–0.8)
ALBUMIN SERPL-MCNC: 2.7 G/DL (ref 3.5–5.2)
ALBUMIN SERPL-MCNC: 2.9 G/DL (ref 3.5–5.2)
ALBUMIN/GLOBULIN RATIO: 1 (ref 1–2.5)
ALBUMIN/GLOBULIN RATIO: 1.3 (ref 1–2.5)
ALP BLD-CCNC: 51 U/L (ref 40–129)
ALP BLD-CCNC: 51 U/L (ref 40–129)
ALT SERPL-CCNC: 21 U/L (ref 5–41)
ALT SERPL-CCNC: 21 U/L (ref 5–41)
AMMONIA: 14 UMOL/L (ref 16–60)
ANION GAP SERPL CALCULATED.3IONS-SCNC: 20 MMOL/L (ref 9–17)
AST SERPL-CCNC: 22 U/L
AST SERPL-CCNC: 25 U/L
BASOPHILS # BLD: 0 % (ref 0–2)
BASOPHILS ABSOLUTE: 0 K/UL (ref 0–0.2)
BILIRUB SERPL-MCNC: 0.24 MG/DL (ref 0.3–1.2)
BILIRUB SERPL-MCNC: 0.24 MG/DL (ref 0.3–1.2)
BILIRUBIN DIRECT: <0.08 MG/DL
BILIRUBIN, INDIRECT: ABNORMAL MG/DL (ref 0–1)
BUN BLDV-MCNC: 71 MG/DL (ref 8–23)
BUN/CREAT BLD: ABNORMAL (ref 9–20)
CALCIUM SERPL-MCNC: 8.2 MG/DL (ref 8.6–10.4)
CHLORIDE BLD-SCNC: 100 MMOL/L (ref 98–107)
CO2: 15 MMOL/L (ref 20–31)
CREAT SERPL-MCNC: 1.3 MG/DL (ref 0.7–1.2)
DIFFERENTIAL TYPE: ABNORMAL
EOSINOPHILS RELATIVE PERCENT: 0 % (ref 1–4)
GFR AFRICAN AMERICAN: >60 ML/MIN
GFR NON-AFRICAN AMERICAN: 54 ML/MIN
GFR SERPL CREATININE-BSD FRML MDRD: ABNORMAL ML/MIN/{1.73_M2}
GFR SERPL CREATININE-BSD FRML MDRD: ABNORMAL ML/MIN/{1.73_M2}
GLOBULIN: ABNORMAL G/DL (ref 1.5–3.8)
GLUCOSE BLD-MCNC: 176 MG/DL (ref 75–110)
GLUCOSE BLD-MCNC: 220 MG/DL (ref 70–99)
HCT VFR BLD CALC: 23 % (ref 40.7–50.3)
HCT VFR BLD CALC: 25 % (ref 40.7–50.3)
HCT VFR BLD CALC: 25.7 % (ref 40.7–50.3)
HCT VFR BLD CALC: 27.1 % (ref 40.7–50.3)
HEMOGLOBIN: 7.5 G/DL (ref 13–17)
HEMOGLOBIN: 7.5 G/DL (ref 13–17)
HEMOGLOBIN: 8.1 G/DL (ref 13–17)
HEMOGLOBIN: 8.1 G/DL (ref 13–17)
IMMATURE GRANULOCYTES: 4 %
INR BLD: 1.1
LIPASE: 86 U/L (ref 13–60)
LYMPHOCYTES # BLD: 23 % (ref 24–44)
MCH RBC QN AUTO: 31.9 PG (ref 25.2–33.5)
MCHC RBC AUTO-ENTMCNC: 29.2 G/DL (ref 28.4–34.8)
MCV RBC AUTO: 109.4 FL (ref 82.6–102.9)
MONOCYTES # BLD: 9 % (ref 1–7)
MORPHOLOGY: ABNORMAL
MORPHOLOGY: ABNORMAL
NRBC AUTOMATED: 0.5 PER 100 WBC
PARTIAL THROMBOPLASTIN TIME: 20 SEC (ref 20.5–30.5)
PDW BLD-RTO: 12.8 % (ref 11.8–14.4)
PLATELET # BLD: 436 K/UL (ref 138–453)
PLATELET ESTIMATE: ABNORMAL
PMV BLD AUTO: 10 FL (ref 8.1–13.5)
POTASSIUM SERPL-SCNC: 4.6 MMOL/L (ref 3.7–5.3)
PROTHROMBIN TIME: 11.1 SEC (ref 9–12)
RBC # BLD: 2.35 M/UL (ref 4.21–5.77)
RBC # BLD: ABNORMAL 10*6/UL
SEG NEUTROPHILS: 64 % (ref 36–66)
SEGMENTED NEUTROPHILS ABSOLUTE COUNT: 14.39 K/UL (ref 1.8–7.7)
SODIUM BLD-SCNC: 135 MMOL/L (ref 135–144)
TOTAL PROTEIN: 5.2 G/DL (ref 6.4–8.3)
TOTAL PROTEIN: 5.3 G/DL (ref 6.4–8.3)
TROPONIN INTERP: ABNORMAL
TROPONIN INTERP: ABNORMAL
TROPONIN T: ABNORMAL NG/ML
TROPONIN T: ABNORMAL NG/ML
TROPONIN, HIGH SENSITIVITY: 25 NG/L (ref 0–22)
TROPONIN, HIGH SENSITIVITY: 30 NG/L (ref 0–22)
WBC # BLD: 22.5 K/UL (ref 3.5–11.3)
WBC # BLD: ABNORMAL 10*3/UL

## 2020-12-27 PROCEDURE — 43255 EGD CONTROL BLEEDING ANY: CPT | Performed by: INTERNAL MEDICINE

## 2020-12-27 PROCEDURE — 2580000003 HC RX 258: Performed by: STUDENT IN AN ORGANIZED HEALTH CARE EDUCATION/TRAINING PROGRAM

## 2020-12-27 PROCEDURE — 99284 EMERGENCY DEPT VISIT MOD MDM: CPT

## 2020-12-27 PROCEDURE — 6360000002 HC RX W HCPCS

## 2020-12-27 PROCEDURE — 93005 ELECTROCARDIOGRAM TRACING: CPT

## 2020-12-27 PROCEDURE — 6360000004 HC RX CONTRAST MEDICATION: Performed by: EMERGENCY MEDICINE

## 2020-12-27 PROCEDURE — 86920 COMPATIBILITY TEST SPIN: CPT

## 2020-12-27 PROCEDURE — 2000000000 HC ICU R&B

## 2020-12-27 PROCEDURE — 87641 MR-STAPH DNA AMP PROBE: CPT

## 2020-12-27 PROCEDURE — 99152 MOD SED SAME PHYS/QHP 5/>YRS: CPT | Performed by: INTERNAL MEDICINE

## 2020-12-27 PROCEDURE — 06HY33Z INSERTION OF INFUSION DEVICE INTO LOWER VEIN, PERCUTANEOUS APPROACH: ICD-10-PCS | Performed by: STUDENT IN AN ORGANIZED HEALTH CARE EDUCATION/TRAINING PROGRAM

## 2020-12-27 PROCEDURE — 84484 ASSAY OF TROPONIN QUANT: CPT

## 2020-12-27 PROCEDURE — 82947 ASSAY GLUCOSE BLOOD QUANT: CPT

## 2020-12-27 PROCEDURE — C9113 INJ PANTOPRAZOLE SODIUM, VIA: HCPCS | Performed by: STUDENT IN AN ORGANIZED HEALTH CARE EDUCATION/TRAINING PROGRAM

## 2020-12-27 PROCEDURE — 2580000003 HC RX 258: Performed by: EMERGENCY MEDICINE

## 2020-12-27 PROCEDURE — 71045 X-RAY EXAM CHEST 1 VIEW: CPT

## 2020-12-27 PROCEDURE — 96374 THER/PROPH/DIAG INJ IV PUSH: CPT

## 2020-12-27 PROCEDURE — 36556 INSERT NON-TUNNEL CV CATH: CPT

## 2020-12-27 PROCEDURE — 83690 ASSAY OF LIPASE: CPT

## 2020-12-27 PROCEDURE — P9016 RBC LEUKOCYTES REDUCED: HCPCS

## 2020-12-27 PROCEDURE — 85025 COMPLETE CBC W/AUTO DIFF WBC: CPT

## 2020-12-27 PROCEDURE — 99222 1ST HOSP IP/OBS MODERATE 55: CPT | Performed by: INTERNAL MEDICINE

## 2020-12-27 PROCEDURE — 6360000002 HC RX W HCPCS: Performed by: STUDENT IN AN ORGANIZED HEALTH CARE EDUCATION/TRAINING PROGRAM

## 2020-12-27 PROCEDURE — 36415 COLL VENOUS BLD VENIPUNCTURE: CPT

## 2020-12-27 PROCEDURE — 80076 HEPATIC FUNCTION PANEL: CPT

## 2020-12-27 PROCEDURE — 86901 BLOOD TYPING SEROLOGIC RH(D): CPT

## 2020-12-27 PROCEDURE — 2720000010 HC SURG SUPPLY STERILE: Performed by: INTERNAL MEDICINE

## 2020-12-27 PROCEDURE — 36430 TRANSFUSION BLD/BLD COMPNT: CPT

## 2020-12-27 PROCEDURE — 86900 BLOOD TYPING SEROLOGIC ABO: CPT

## 2020-12-27 PROCEDURE — 80053 COMPREHEN METABOLIC PANEL: CPT

## 2020-12-27 PROCEDURE — 85730 THROMBOPLASTIN TIME PARTIAL: CPT

## 2020-12-27 PROCEDURE — 3609012300 HC EGD BAND LIGATION ESOPHGEAL/GASTRIC VARICES: Performed by: INTERNAL MEDICINE

## 2020-12-27 PROCEDURE — 85610 PROTHROMBIN TIME: CPT

## 2020-12-27 PROCEDURE — 86850 RBC ANTIBODY SCREEN: CPT

## 2020-12-27 PROCEDURE — 99291 CRITICAL CARE FIRST HOUR: CPT | Performed by: INTERNAL MEDICINE

## 2020-12-27 PROCEDURE — 85018 HEMOGLOBIN: CPT

## 2020-12-27 PROCEDURE — 06L38CZ OCCLUSION OF ESOPHAGEAL VEIN WITH EXTRALUMINAL DEVICE, VIA NATURAL OR ARTIFICIAL OPENING ENDOSCOPIC: ICD-10-PCS | Performed by: INTERNAL MEDICINE

## 2020-12-27 PROCEDURE — 82140 ASSAY OF AMMONIA: CPT

## 2020-12-27 PROCEDURE — 6370000000 HC RX 637 (ALT 250 FOR IP): Performed by: STUDENT IN AN ORGANIZED HEALTH CARE EDUCATION/TRAINING PROGRAM

## 2020-12-27 PROCEDURE — 74174 CTA ABD&PLVS W/CONTRAST: CPT

## 2020-12-27 PROCEDURE — 85014 HEMATOCRIT: CPT

## 2020-12-27 RX ORDER — FENTANYL CITRATE 50 UG/ML
INJECTION, SOLUTION INTRAMUSCULAR; INTRAVENOUS
Status: COMPLETED
Start: 2020-12-27 | End: 2020-12-27

## 2020-12-27 RX ORDER — ONDANSETRON 2 MG/ML
INJECTION INTRAMUSCULAR; INTRAVENOUS
Status: COMPLETED
Start: 2020-12-27 | End: 2020-12-27

## 2020-12-27 RX ORDER — SODIUM CHLORIDE 9 MG/ML
INJECTION, SOLUTION INTRAVENOUS CONTINUOUS
Status: ACTIVE | OUTPATIENT
Start: 2020-12-27 | End: 2020-12-27

## 2020-12-27 RX ORDER — POTASSIUM CHLORIDE 20 MEQ/1
40 TABLET, EXTENDED RELEASE ORAL PRN
Status: DISCONTINUED | OUTPATIENT
Start: 2020-12-27 | End: 2020-12-30 | Stop reason: HOSPADM

## 2020-12-27 RX ORDER — ACETAMINOPHEN 325 MG/1
650 TABLET ORAL EVERY 6 HOURS PRN
Status: DISCONTINUED | OUTPATIENT
Start: 2020-12-27 | End: 2020-12-30 | Stop reason: HOSPADM

## 2020-12-27 RX ORDER — FENTANYL CITRATE 50 UG/ML
50 INJECTION, SOLUTION INTRAMUSCULAR; INTRAVENOUS ONCE
Status: COMPLETED | OUTPATIENT
Start: 2020-12-27 | End: 2020-12-27

## 2020-12-27 RX ORDER — POTASSIUM CHLORIDE 7.45 MG/ML
10 INJECTION INTRAVENOUS PRN
Status: DISCONTINUED | OUTPATIENT
Start: 2020-12-27 | End: 2020-12-30 | Stop reason: HOSPADM

## 2020-12-27 RX ORDER — PROMETHAZINE HYDROCHLORIDE 12.5 MG/1
12.5 TABLET ORAL EVERY 6 HOURS PRN
Status: DISCONTINUED | OUTPATIENT
Start: 2020-12-27 | End: 2020-12-30 | Stop reason: HOSPADM

## 2020-12-27 RX ORDER — ONDANSETRON 2 MG/ML
4 INJECTION INTRAMUSCULAR; INTRAVENOUS EVERY 6 HOURS PRN
Status: DISCONTINUED | OUTPATIENT
Start: 2020-12-27 | End: 2020-12-30 | Stop reason: HOSPADM

## 2020-12-27 RX ORDER — BUMETANIDE 1 MG/1
2 TABLET ORAL 2 TIMES DAILY
Status: DISCONTINUED | OUTPATIENT
Start: 2020-12-27 | End: 2020-12-27

## 2020-12-27 RX ORDER — POLYETHYLENE GLYCOL 3350 17 G/17G
17 POWDER, FOR SOLUTION ORAL DAILY PRN
Status: DISCONTINUED | OUTPATIENT
Start: 2020-12-27 | End: 2020-12-30 | Stop reason: HOSPADM

## 2020-12-27 RX ORDER — 0.9 % SODIUM CHLORIDE 0.9 %
250 INTRAVENOUS SOLUTION INTRAVENOUS ONCE
Status: COMPLETED | OUTPATIENT
Start: 2020-12-27 | End: 2020-12-27

## 2020-12-27 RX ORDER — SODIUM CHLORIDE 0.9 % (FLUSH) 0.9 %
10 SYRINGE (ML) INJECTION EVERY 12 HOURS SCHEDULED
Status: DISCONTINUED | OUTPATIENT
Start: 2020-12-27 | End: 2020-12-30 | Stop reason: HOSPADM

## 2020-12-27 RX ORDER — MAGNESIUM SULFATE IN WATER 40 MG/ML
2 INJECTION, SOLUTION INTRAVENOUS PRN
Status: DISCONTINUED | OUTPATIENT
Start: 2020-12-27 | End: 2020-12-30 | Stop reason: HOSPADM

## 2020-12-27 RX ORDER — MIDAZOLAM HYDROCHLORIDE 1 MG/ML
INJECTION INTRAMUSCULAR; INTRAVENOUS
Status: COMPLETED
Start: 2020-12-27 | End: 2020-12-27

## 2020-12-27 RX ORDER — 0.9 % SODIUM CHLORIDE 0.9 %
1000 INTRAVENOUS SOLUTION INTRAVENOUS ONCE
Status: COMPLETED | OUTPATIENT
Start: 2020-12-27 | End: 2020-12-27

## 2020-12-27 RX ORDER — MIDAZOLAM HYDROCHLORIDE 2 MG/2ML
2 INJECTION, SOLUTION INTRAMUSCULAR; INTRAVENOUS ONCE
Status: COMPLETED | OUTPATIENT
Start: 2020-12-27 | End: 2020-12-27

## 2020-12-27 RX ORDER — HYDROCODONE BITARTRATE AND ACETAMINOPHEN 5; 325 MG/1; MG/1
1 TABLET ORAL EVERY 6 HOURS PRN
Status: DISCONTINUED | OUTPATIENT
Start: 2020-12-27 | End: 2020-12-30 | Stop reason: HOSPADM

## 2020-12-27 RX ORDER — 0.9 % SODIUM CHLORIDE 0.9 %
250 INTRAVENOUS SOLUTION INTRAVENOUS ONCE
Status: DISCONTINUED | OUTPATIENT
Start: 2020-12-27 | End: 2020-12-30 | Stop reason: HOSPADM

## 2020-12-27 RX ORDER — ONDANSETRON 2 MG/ML
4 INJECTION INTRAMUSCULAR; INTRAVENOUS ONCE
Status: COMPLETED | OUTPATIENT
Start: 2020-12-27 | End: 2020-12-27

## 2020-12-27 RX ORDER — SODIUM CHLORIDE 0.9 % (FLUSH) 0.9 %
10 SYRINGE (ML) INJECTION PRN
Status: DISCONTINUED | OUTPATIENT
Start: 2020-12-27 | End: 2020-12-30 | Stop reason: HOSPADM

## 2020-12-27 RX ORDER — ACETAMINOPHEN 650 MG/1
650 SUPPOSITORY RECTAL EVERY 6 HOURS PRN
Status: DISCONTINUED | OUTPATIENT
Start: 2020-12-27 | End: 2020-12-30 | Stop reason: HOSPADM

## 2020-12-27 RX ADMIN — MIDAZOLAM HYDROCHLORIDE 2 MG: 1 INJECTION, SOLUTION INTRAMUSCULAR; INTRAVENOUS at 13:52

## 2020-12-27 RX ADMIN — MIDAZOLAM HYDROCHLORIDE 2 MG: 2 INJECTION, SOLUTION INTRAMUSCULAR; INTRAVENOUS at 13:52

## 2020-12-27 RX ADMIN — PANTOPRAZOLE SODIUM 8 MG/HR: 40 INJECTION, POWDER, FOR SOLUTION INTRAVENOUS at 06:52

## 2020-12-27 RX ADMIN — FENTANYL CITRATE 50 MCG: 50 INJECTION, SOLUTION INTRAMUSCULAR; INTRAVENOUS at 13:53

## 2020-12-27 RX ADMIN — SODIUM CHLORIDE 250 ML: 9 INJECTION, SOLUTION INTRAVENOUS at 08:24

## 2020-12-27 RX ADMIN — Medication 10 ML: at 12:20

## 2020-12-27 RX ADMIN — HYDROCODONE BITARTRATE AND ACETAMINOPHEN 1 TABLET: 5; 325 TABLET ORAL at 22:17

## 2020-12-27 RX ADMIN — SODIUM CHLORIDE: 9 INJECTION, SOLUTION INTRAVENOUS at 13:12

## 2020-12-27 RX ADMIN — Medication 10 ML: at 12:19

## 2020-12-27 RX ADMIN — SODIUM CHLORIDE, PRESERVATIVE FREE 10 ML: 5 INJECTION INTRAVENOUS at 21:00

## 2020-12-27 RX ADMIN — IOPAMIDOL 90 ML: 755 INJECTION, SOLUTION INTRAVENOUS at 08:58

## 2020-12-27 RX ADMIN — ONDANSETRON 4 MG: 2 INJECTION INTRAMUSCULAR; INTRAVENOUS at 07:00

## 2020-12-27 RX ADMIN — Medication 10 ML: at 14:22

## 2020-12-27 RX ADMIN — PANTOPRAZOLE SODIUM 8 MG/HR: 40 INJECTION, POWDER, FOR SOLUTION INTRAVENOUS at 15:18

## 2020-12-27 RX ADMIN — SODIUM CHLORIDE 1000 ML: 9 INJECTION, SOLUTION INTRAVENOUS at 06:15

## 2020-12-27 RX ADMIN — OCTREOTIDE ACETATE 25 MCG/HR: 1000 INJECTION, SOLUTION INTRAVENOUS; SUBCUTANEOUS at 07:24

## 2020-12-27 ASSESSMENT — ENCOUNTER SYMPTOMS
BLOOD IN STOOL: 1
DIARRHEA: 0
ABDOMINAL PAIN: 1
NAUSEA: 1
BACK PAIN: 0
CHEST TIGHTNESS: 0
EYE PAIN: 0
SORE THROAT: 0
SHORTNESS OF BREATH: 0
VOMITING: 1
COUGH: 0
CONSTIPATION: 0
EYE REDNESS: 0
RHINORRHEA: 0

## 2020-12-27 ASSESSMENT — PAIN DESCRIPTION - PAIN TYPE: TYPE: CHRONIC PAIN

## 2020-12-27 ASSESSMENT — PAIN DESCRIPTION - ORIENTATION
ORIENTATION: LEFT;LOWER
ORIENTATION: LEFT;LOWER

## 2020-12-27 ASSESSMENT — PAIN SCALES - GENERAL
PAINLEVEL_OUTOF10: 9
PAINLEVEL_OUTOF10: 0
PAINLEVEL_OUTOF10: 0

## 2020-12-27 ASSESSMENT — PAIN DESCRIPTION - LOCATION
LOCATION: BACK
LOCATION: BACK;BUTTOCKS

## 2020-12-27 ASSESSMENT — PAIN DESCRIPTION - ONSET: ONSET: ON-GOING

## 2020-12-27 ASSESSMENT — PAIN DESCRIPTION - DESCRIPTORS
DESCRIPTORS: ACHING
DESCRIPTORS: ACHING;SORE

## 2020-12-27 NOTE — CONSULTS
respiratory symptoms. 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    Diabetes mellitus ([address]     Former smoker Pt states he quit 27 years ago   [facility] H/O ventricular tachycardia 3/25/2017    Hyperlipidemia     Hypertension      Past Surgical History:   Procedure Laterality Date    CARDIAC CATHETERIZATION      multiple, last 4-2016    CARDIAC DEFIBRILLATOR PLACEMENT  07/11/2016    CARDIAC DEFIBRILLATOR PLACEMENT  06/01/2020    Up grade to Bi-V    CORONARY ANGIOPLASTY      multiple last date 4-2016    CORONARY ANGIOPLASTY  02/12/2020       ALLERGIES:  No Known Allergies    HOME MEDICATIONS:  Prior to Admission medications    Medication Sig Start Date End Date Taking? Authorizing Provider   gabapentin (NEURONTIN) 300 MG capsule Take 300 mg by mouth 3 times daily. Historical Provider, MD   citalopram (CELEXA) 40 MG tablet Take 40 mg by mouth daily    Historical Provider, MD   sacubitril-valsartan (ENTRESTO) 49-51 MG per tablet Take 1 tablet by mouth 2 times daily    Historical Provider, MD   bumetanide (BUMEX) 2 MG tablet Take 2 mg by mouth 2 times daily    Historical Provider, MD   potassium chloride (KLOR-CON M) 20 MEQ extended release tablet Take 20 mEq by mouth 2 times daily    Historical Provider, MD   zolpidem (AMBIEN) 10 MG tablet Take by mouth nightly as needed for Sleep. Historical Provider, MD   nitroGLYCERIN (NITROSTAT) 0.4 MG SL tablet Place 1 tablet under the tongue every 5 minutes as needed for Chest pain up to max of 3 total doses.  If no relief after 1 dose, call 911. 3/28/17   Luisa Montano APRN - CNP   atorvastatin (LIPITOR) 80 MG tablet Take 1 tablet by mouth nightly 1/23/17   Fabio Turner DO   glimepiride (AMARYL) 1 MG tablet Take 1 mg by mouth 2 negative except as mentioned in the [address] PHYSICAL EXAM:    BP (!) 91/52   Pulse 74   Temp 98 °F (36.7 °C) (Oral)   Resp 20   Ht 5' 8\" (1.727 m)   Wt 204 lb (92.5 kg)   SpO2 99%   BMI 31.02 kg/m²     GENERAL: Pale, Well developed, Well nourished  HEAD:   Normocephalic, Atraumatic  EENT:   EOMI, Sclera not icteric, Oropharynx moist   NECK:   Supple, Trachea midline  LUNGS:  CTA Bilaterally  HEART:  RRR, No murmur, + PPM  ABDOMEN:   Soft, obese, Nontender, Nondistended, BS WNL  EXT:   No clubbing. No cyanosis. No edema. SKIN:   No rashes. No jaundice. No stigmata of liver disease. MUSC/SKEL:   Adequate muscle bulk for patient's age, No significant synovitis, No deformities  NEURO:  A&O x Three, CN II- XII grossly intact      LABS AND IMAGING:     CBC  Recent Labs     12/27/20  0603   WBC 22.5*   HGB 7.5*   HCT 25.7*   .4*   MCH 31.9   MCHC 29.2          IMMATURE PLTs  No results found for: PLTFLUORE    BMP  Recent Labs     12/27/20  0603      K 4.6      CO2 15*   BUN 71*   CREATININE 1.30*   GLUCOSE 220*   CALCIUM 8.2*       LFTS  Recent Labs     12/27/20  0603 12/27/20  0617   ALKPHOS 51 51   ALT 21 21   AST 25 22   PROT 5.3* 5.2*   BILITOT 0.24* 0.24*   BILIDIR  --  <0.08   LABALBU 2.7* 2.9*       AMYLASE/LIPASE/AMMONIA  Recent Labs     12/27/20  0617   LIPASE 86*   AMMONIA 14*       PT/INR  Recent Labs     12/27/20  0617   PROTIME 11.1   INR 1.1       ANEMIA STUDIES  No results for input(s): IRON, LABIRON, TIBC, UIBC, FERRITIN, [id number], FOLATE, OCCULTBLD in the last 72 hours. IMAGING  Xr Chest Portable    Result Date: 12/27/2020  EXAMINATION: ONE XRAY VIEW OF THE CHEST 12/27/2020 7:38 am COMPARISON: 06/02/2020 HISTORY: ORDERING SYSTEM PROVIDED HISTORY: hematemesis TECHNOLOGIST PROVIDED HISTORY: hematemesis FINDINGS: Left-sided tripolar cardiac pacer/defibrillator is unchanged in configuration. The lungs are clear.   The heart is enlarged with a normal appearance of pulmonary vasculature. There is no pneumothorax or effusion. Cardiomegaly. Cta Abdomen Pelvis W Wo Contrast    Result Date: 12/27/2020  EXAMINATION: CTA OF THE ABDOMEN AND PELVIS WITH AND WITHOUT CONTRAST 12/27/2020 8:40 am: TECHNIQUE: CTA of the abdomen and pelvis was performed without and with the administration of intravenous contrast. Multiplanar reformatted images are provided for review. MIP images are provided for review. Dose modulation, iterative reconstruction, and/or weight based adjustment of the mA/kV was utilized to reduce the radiation dose to as low as reasonably achievable. COMPARISON: None. HISTORY: ORDERING SYSTEM PROVIDED HISTORY: GI Bleed TECHNOLOGIST PROVIDED HISTORY: GI Bleed Reason for Exam: GI bleed Acuity: Acute Type of Exam: Initial FINDINGS: Vascular: There is moderate diffuse atherosclerotic disease. Normal caliber abdominal aorta showing no evidence for aneurysm or dissection. Normal enhancement of celiac axis and visualized proximal branches. Normal enhancement of SMA visualized proximal branches. ZELDA and iliac arteries enhance normally. Normal enhancement of bilateral renal arteries. Scattered atherosclerotic calcifications in the mesenteric and renal arteries. No active extravasation of contrast is noted. Lower chest: Subsegmental atelectasis/scarring right posterior sulcus. Pacemaker wires. Organs: Tiny layering gallstones. The liver, spleen, pancreas, adrenal glands and kidneys show no significant abnormalities. Several tiny left renal hypodensities too small to characterize likely cysts. GI tract: There is limited evaluation due to absence of oral contrast.  Stomach shows no focal lesions. No evidence for bowel obstruction. No evidence for appendicitis. No acute process in the colon. Pelvis: Mild prostatomegaly. Urinary bladder grossly normal.  Fat containing right inguinal hernia. Peritoneum/Retroperitoneum: No free fluid or significant lymphadenopathy.   No free intraperitoneal air. Bones/Soft tissues: Diffuse degenerative changes. 1. Moderate diffuse atherosclerotic disease. No evidence for aneurysm or dissection. No active extravasation of contrast to indicate CT evidence of active bleeding. 2. No acute infective or inflammatory process. 3. No bowel obstruction. IMPRESSION:     29-year-old male with a history of CAD w/stents on ASA and Plavix,, history of V. tach with PPM,  DM, and hypertension who presented to the ED with reports of melena and hematemesis. No past history of any significant alcohol use, no NSAID use. 1.  Upper GI bleed with hematemesis and melena. DDX - PUD, [place]ul[place] lesion, Mass, erosive esophagitis/duodenitis/gastritis  2. Acute blood loss anemia  3. Hypotension - secondary to acute blood loss  4. Hx of CAD with stents on DAPT  5. Recent hx of COVID 19 - 11/23/2020  6. Leukocytosis      Old records, labs and imaging reviewed. PLAN   1. Patient will need urgent EGD today in the OR - OR notified and GI awaiting time slot  2. Continue Protonix and octreotide drips for now  3. Continue NPO  4. Serial monitoring of Hgb/Hct  5. Fluid resuscitation per ED and/or critical care management  6. Recommend outpatient screening colonoscopy. No past history of screening colonoscopy      This plan was formulated in collaboration with Dr. Chtena Alcantara  Thank you for allowing us to participate in the care of your patient. Electronically signed by: Hardik FERRARI on 12/27/2020 at 11:09 AM     Please note that this note was generated using a voice recognition dictation software. Although every effort was made to ensure the accuracy of this automated transcription, some errors in transcription may have occurred.

## 2020-12-27 NOTE — H&P
Critical Care - History and Physical Examination    Patient's name:  José Miguel Parsons  Medical Record Number: [id number]  Patient's account/billing number: [de-identified]  Patient's YOB: 1945  Age: 75 y.o. Date of Admission: 12/27/2020  5:51 AM  Reason of ICU admission:   Date of History and Physical Examination: 12/27/2020      Primary Care Physician: Edmond Andre MD  Attending Physician:    Code Status: Full Code    Chief complaint:     Reason for ICU admission: Upper GI bleed, hemodynamic instability      History Of Present Illness:   History was obtained from chart review and the patient. José Miguel Parsons is a 75 y.o. with past medical history of CAD status post stent on aspirin and Plavix, history of V. tach with PPM, diabetes mellitus and hypertension who presented to ED with reports of melena hematemesis. As per patient he had episode of hematemesis started early this morning around 4 AM along with melena last evening. Patient has hematemesis of both dark red and bright red blood. Patient never had EGD and colonoscopy before patient never had an episode of bloody bowel movement or vomiting ever before according to ED, EMS estimated blood loss was around 1 L with some clots. Patient denied any NSAID use other than low-dose aspirin, denies any significant alcohol intake. On arrival patient was hypotensive and received total 2 L of fluid bolus. He was a started on PPI drip along with octreotide. Hemoglobin around a week ago was 12 and during this admission hemoglobin was 7.5. Patient received total of 2 units of PRBC as per GI. Patient denies any epigastric pain, chest pain, nausea, dizziness, leg pain, leg swelling, shortness of breath but endorses feeling weak and tired. Of note patient was diagnosed with Covid and 11/23/2020. Patient was not treated for that. CTA abdomen was done which was negative for acute GI bleed. GI consulted from ER.   Past Medical History: Diagnosis Date    Arthritis     CAD (coronary artery disease)     stents x7    Clinical trial participant at discharge 07/12/2016    [employer]    Diabetes mellitus ([facility] [address]     Former smoker Pt states he quit 27 years ago   [de-identified] H/O ventricular tachycardia 3/25/2017    Hyperlipidemia     Hypertension        Past Surgical History:        Procedure Laterality Date    CARDIAC CATHETERIZATION      multiple, last 4-2016    CARDIAC DEFIBRILLATOR PLACEMENT  07/11/2016    CARDIAC DEFIBRILLATOR PLACEMENT  06/01/2020    Up grade to Bi-V    CORONARY ANGIOPLASTY      multiple last date 4-2016    CORONARY ANGIOPLASTY  02/12/2020       Allergies:    No Known Allergies      Home Medications:   Prior to Admission medications    Medication Sig Start Date End Date Taking? Authorizing Provider   gabapentin (NEURONTIN) 300 MG capsule Take 300 mg by mouth 3 times daily. Historical Provider, MD   citalopram (CELEXA) 40 MG tablet Take 40 mg by mouth daily    Historical Provider, MD   sacubitril-valsartan (ENTRESTO) 49-51 MG per tablet Take 1 tablet by mouth 2 times daily    Historical Provider, MD   bumetanide (BUMEX) 2 MG tablet Take 2 mg by mouth 2 times daily    Historical Provider, MD   potassium chloride (KLOR-CON M) 20 MEQ extended release tablet Take 20 mEq by mouth 2 times daily    Historical Provider, MD   zolpidem (AMBIEN) 10 MG tablet Take by mouth nightly as needed for Sleep. Historical Provider, MD   nitroGLYCERIN (NITROSTAT) 0.4 MG SL tablet Place 1 tablet under the tongue every 5 minutes as needed for Chest pain up to max of 3 total doses.  If no relief after 1 dose, call 911. 3/28/17   SHANDRA Blas - CNP   atorvastatin (LIPITOR) 80 MG tablet Take 1 tablet by mouth nightly 1/23/17   Karina Turner DO   glimepiride (AMARYL) 1 MG tablet Take 1 mg by mouth 2 times daily     Historical Provider, MD   Omega-3 Fatty Acids (FISH OIL PO) Take 1 tablet by mouth 2 times daily    Historical Provider, MD metFORMIN (GLUCOPHAGE) 1000 MG tablet Take 1 tablet by mouth 2 times daily (with meals)  Patient taking differently: Take 500 mg by mouth 2 times daily (with meals)  4/7/16   SHANDRA Lozano - CNP   aspirin 81 MG chewable tablet Take 81 mg by mouth daily    Historical Provider, MD   folic acid (FOLVITE) 1 MG tablet Take 1 mg by mouth daily    Historical Provider, MD   clopidogrel (PLAVIX) 75 MG tablet Take 75 mg by mouth daily    Historical Provider, MD   HYDROcodone-acetaminophen (NORCO) 5-325 MG per tablet Take 1 tablet by mouth every 6 hours as needed for Pain    Historical Provider, MD       Social History:   TOBACCO:   reports that he has quit smoking. He has never used smokeless tobacco.  ETOH:   reports previous alcohol use of about 1.0 standard drinks of alcohol per week. DRUGS:  reports no history of drug use. OCCUPATION:      Family History:       Problem Relation Age of Onset    Heart Failure Mother            REVIEW OF SYSTEMS (ROS):  Review of Systems - Negative except mentioned in HPI   General ROS: negative  Psychological ROS: negative  Ophthalmic ROS: negative  ENT: negative  Hematological and Lymphatic ROS: negative  Endocrine ROS: negative  Breast ROS: negative  Respiratory ROS: no cough, shortness of breath, or wheezing  Cardiovascular ROS: no chest pain or dyspnea on exertion  Gastrointestinal ROS:negative  Genito-Urinary ROS: negative  Musculoskeletal ROS: negative  Neurological ROS: negative  Dermatological ROS: negative      Physical Exam:    Vitals: BP (!) 114/52   Pulse 70   Temp 98.1 °F (36.7 °C) (Oral)   Resp 18   Ht 5' 8\" (1.727 m)   Wt 213 lb 6.5 oz (96.8 kg)   SpO2 99%   BMI 32.45 kg/m²     Body weight:   Wt Readings from Last 3 Encounters:   12/27/20 213 lb 6.5 oz (96.8 kg)   06/01/20 226 lb (102.5 kg)   02/13/20 211 lb 3.2 oz (95.8 kg)       Body Mass Index : Body mass index is 32.45 kg/m².           PHYSICAL EXAMINATION :  Constitutional: Appears well, mild HCO3, O2SAT  Thyroid functions:   Lab Results   Component Value Date    TSH 2.44 01/20/2017           Assessment and Plan     Patient Active Problem List   Diagnosis    AICD discharge    Syncope    Essential hypertension    Hyperlipidemia    Type 2 diabetes mellitus without complication, without long-term current use of insulin (HCC)    CAD (coronary artery disease)    Arthritis    Hypocalcemia    Hypophosphatemia    V tach ([address]    H/O ventricular tachycardia    S/P angioplasty with stent    S/P ICD (internal cardiac defibrillator) procedure    GI bleed         Plan:      1. Acute upper GI bleed leading to blood loss anemia and hemorrhagic shock. CTA abdomen negative. GI consulted. Patient will go for urgent EGD. Continue Protonix and octreotide. H&H every 6. Transfuse blood to keep hemoglobin above 8. Continue fluid resuscitation. Patient is CVC line in will start pressors if needed. Hold antihypertensive medications. We will hold home dose of Bumex. 2. Chronic ischemic cardiomyopathy/systolic heart failure with ejection fraction of 35%. Fluid resuscitation with caution. Will hold Bumex for now. Will resume Bumex when stable. 3. History of CAD with stents on DAPT. Currently on hold. 4. DARSHANA. Creatinine 1.3. Continue fluid resuscitation. Anticipate improvement. Monitor I's and O's. Avoid nephrotoxic agent.       Jason Albert MD  PGY-2, Internal medicine resident  [facility], [place], [place]

## 2020-12-27 NOTE — OP NOTE
EGD      Patient:   Claribel Gallo    :    1945    Facility:   [facility]   Referring/PCP: Fred Vlades MD    Procedure:   Esophagogastroduodenoscopy with control of bleeding  Date:     2020   Endoscopist:  Paulino Monroy MD, [facility]    Indication:   Hematemesis and Melena    Postprocedure diagnosis:  Esophagitis with small esophageal ulcers with vessel- banded X2    Anesthesia:  Conscious  Sedation with versed 2 mg and Fentanyl 50 mcg administered by ICU nurse under my supervision with close monitoring of vitals every 3 minutes. Total time spent on conscious sedation was 22 minutes. Complications: None    EBL: None from the procedure    Specimen: None    Description of Procedure:  Informed consent was obtained from the patient after explanation of the procedure including indications, description of the procedure,  benefits and possible risks and complications of the procedure, and alternatives. Questions were answered. The patient's history was reviewed and a directed physical examination was performed prior to the procedure. Patient was monitored throughout the procedure with pulse oximetry and periodic assessment of vital signs. Patient was sedated as noted above. With the patient in the left lateral decubitus position, the Olympus videoendoscope was placed in the patient's mouth and under direct visualization passed into the esophagus. Visualization of the esophagus, stomach, and duodenum was performed during both introduction and withdrawal of the endoscope and retroflexed view of the proximal stomach was obtained. The scope was passed to the 2nd portion of the duodenum. The patient tolerated the procedure well and was taken to the recovery area in good condition. Findings[de-identified]   Esophagus: The esophagus had two small ulcers at GE junction with visible vessel. This were banded with 2 bands. There was no bleeding at the end of the procedure.  The esophagus had

## 2020-12-27 NOTE — ED PROVIDER NOTES
[facility]  ED  Emergency Department Encounter  Emergency Medicine Resident     Pt Name: Nadira Smith  MRN: [id number]  Ever 1945  Date of evaluation: 12/27/20  PCP:  Tamia Gongora MD    CHIEF COMPLAINT       Chief Complaint   Patient presents with    Hematemesis       HISTORY OFPRESENT ILLNESS  (Location/Symptom, Timing/Onset, Context/Setting, Quality, Duration, Modifying [facility].)      Nadira Smith is a 75 y.o. male with a past medical history of coronary artery disease, diabetes, hypertension, hyperlipidemia, defibrillator, coronary stents who presents via EMS for hematemesis. Patient reports generalized malaise and weakness which began yesterday. He then had massive hematemesis consisting of of bright red blood with dark clots in it. Per EMS, they estimated approximately 1 L of blood on the floor. Patient was hypotensive on their arrival and multiple IV attempts were unsuccessful. He reports 2 episodes of hematemesis this evening and one episode of dark tarry stools today. He denies easy bruising, easy bleeding, hematuria, history of coagulopathy. He is on Plavix and 81 mg of aspirin but no other anticoagulants. He denies taking NSAIDs or drinking alcohol. Patient denies prior GI bleed but thinks he has received a blood transfusion in the past.  Of note, he was admitted to [address] [place] [address] 3 days ago for concern for septic arthritis which turned out to be gout. No mention of GI bleed during this admission. However, he had a hemoglobin of 13.3 12/22 which dropped to 10.9 on 12/23. Patient was also found to be Covid + 11/23/2020. He currently denies fever, chest pain, shortness of breath, cough, loss of taste or smell.     PAST MEDICAL / SURGICAL / SOCIAL / FAMILY HISTORY      has a past medical history of Arthritis, CAD (coronary artery disease), Clinical trial participant at discharge, Diabetes mellitus ([facility] [address] Former smoker, H/O ventricular tachycardia, Hyperlipidemia, and Hypertension. has a past surgical history that includes Cardiac catheterization; Coronary angioplasty; Cardiac defibrillator placement (07/11/2016); Coronary angioplasty (02/12/2020); and Cardiac defibrillator placement (06/01/2020). Social:  reports that he has quit smoking. He has never used smokeless tobacco. He reports previous alcohol use of about 1.0 standard drinks of alcohol per week. He reports that he does not use drugs. Family Hx:   Family History   Problem Relation Age of Onset    Heart Failure Mother         Allergies:  Patient has no known allergies. Home Medications:  Prior to Admission medications    Medication Sig Start Date End Date Taking? Authorizing Provider   gabapentin (NEURONTIN) 300 MG capsule Take 300 mg by mouth 3 times daily. Historical Provider, MD   citalopram (CELEXA) 40 MG tablet Take 40 mg by mouth daily    Historical Provider, MD   sacubitril-valsartan (ENTRESTO) 49-51 MG per tablet Take 1 tablet by mouth 2 times daily    Historical Provider, MD   bumetanide (BUMEX) 2 MG tablet Take 2 mg by mouth 2 times daily    Historical Provider, MD   potassium chloride (KLOR-CON M) 20 MEQ extended release tablet Take 20 mEq by mouth 2 times daily    Historical Provider, MD   zolpidem (AMBIEN) 10 MG tablet Take by mouth nightly as needed for Sleep. Historical Provider, MD   nitroGLYCERIN (NITROSTAT) 0.4 MG SL tablet Place 1 tablet under the tongue every 5 minutes as needed for Chest pain up to max of 3 total doses.  If no relief after 1 dose, call 911. 3/28/17   SHANDRA Lopez - CNP   atorvastatin (LIPITOR) 80 MG tablet Take 1 tablet by mouth nightly 1/23/17   Kiel Turner DO   glimepiride (AMARYL) 1 MG tablet Take 1 mg by mouth 2 times daily     Historical Provider, MD   Omega-3 Fatty Acids (FISH OIL PO) Take 1 tablet by mouth 2 times daily    Historical Provider, MD   metFORMIN (GLUCOPHAGE) 1000 MG tablet Take 1 tablet by mouth 2 times daily (with meals)  Patient taking differently: Take 500 mg by mouth 2 times daily (with meals)  4/7/16   SHANDRA Lozano - CNP   aspirin 81 MG chewable tablet Take 81 mg by mouth daily    Historical Provider, MD   folic acid (FOLVITE) 1 MG tablet Take 1 mg by mouth daily    Historical Provider, MD   clopidogrel (PLAVIX) 75 MG tablet Take 75 mg by mouth daily    Historical Provider, MD   HYDROcodone-acetaminophen (NORCO) 5-325 MG per tablet Take 1 tablet by mouth every 6 hours as needed for Pain    Historical Provider, MD       REVIEW OFSYSTEMS    (2-9 systems for level 4, 10 or more for level 5)      Review of Systems   Constitutional: Positive for activity change. Negative for chills and fever. HENT: Negative for congestion, ear pain, rhinorrhea and sore throat. Eyes: Negative for pain and redness. Respiratory: Negative for cough, chest tightness and shortness of breath. Cardiovascular: Negative for chest pain and leg swelling. Gastrointestinal: Positive for abdominal pain, blood in stool, nausea and vomiting. Negative for constipation and diarrhea. Genitourinary: Negative for decreased urine volume, difficulty urinating, flank pain, frequency and hematuria. Musculoskeletal: Negative for arthralgias, back pain and neck pain. Skin: Negative for rash and wound. Neurological: Positive for light-headedness. Negative for dizziness, weakness, numbness and headaches. Psychiatric/Behavioral: Negative for behavioral problems and confusion. All other systems reviewed and are negative. PHYSICAL EXAM   (up to 7 for level 4, 8 or more forlevel 5)      INITIAL VITALS:   Vitals:    12/27/20 0941   BP: 96/72   Pulse: 77   Resp: 19   Temp:    SpO2:         Physical Exam  Vitals signs and nursing note reviewed. Constitutional:       General: He is not in acute distress. Appearance: He is ill-appearing. He is not toxic-appearing.       Comments: Elderly male lying in bed appearing acutely Granulocyte 0.90 (H) 0.00 - 0.30 k/uL    Segs Absolute 14.39 (H) 1.8 - 7.7 k/uL    Absolute Lymph # 5.18 (H) 1.0 - 4.8 k/uL    Absolute Mono # 2.03 (H) 0.1 - 0.8 k/uL    Absolute Eos # 0.00 0.0 - 0.4 k/uL    Basophils Absolute 0.00 0.0 - 0.2 k/uL    Morphology MACROCYTOSIS PRESENT     Morphology 1+ ACANTHOCYTES    COMPREHENSIVE METABOLIC PANEL   Result Value Ref Range    Glucose 220 (H) 70 - 99 mg/dL    BUN 71 (H) 8 - 23 mg/dL    CREATININE 1.30 (H) 0.70 - 1.20 mg/dL    Bun/Cre Ratio NOT REPORTED 9 - 20    Calcium 8.2 (L) 8.6 - 10.4 mg/dL    Sodium 135 135 - 144 mmol/L    Potassium 4.6 3.7 - 5.3 mmol/L    Chloride 100 98 - 107 mmol/L    CO2 15 (L) 20 - 31 mmol/L    Anion Gap 20 (H) 9 - 17 mmol/L    Alkaline Phosphatase 51 40 - 129 U/L    ALT 21 5 - 41 U/L    AST 25 <40 U/L    Total Bilirubin 0.24 (L) 0.3 - 1.2 mg/dL    Total Protein 5.3 (L) 6.4 - 8.3 g/dL    Alb 2.7 (L) 3.5 - 5.2 g/dL    Albumin/Globulin Ratio 1.0 1.0 - 2.5    GFR Non- 54 (L) >60 mL/min    GFR African American >60 >60 mL/min    GFR Comment          GFR Staging NOT REPORTED    AMMONIA   Result Value Ref Range    Ammonia 14 (L) 16 - 60 umol/L   Troponin   Result Value Ref Range    Troponin, High Sensitivity 30 (H) 0 - 22 ng/L    Troponin T NOT REPORTED <0.03 ng/mL    Troponin Interp NOT REPORTED    LIPASE   Result Value Ref Range    Lipase 86 (H) 13 - 60 U/L   Hepatic Function Panel   Result Value Ref Range    Alb 2.9 (L) 3.5 - 5.2 g/dL    Alkaline Phosphatase 51 40 - 129 U/L    ALT 21 5 - 41 U/L    AST 22 <40 U/L    Total Bilirubin 0.24 (L) 0.3 - 1.2 mg/dL    Bilirubin, Direct <0.08 <0.31 mg/dL    Bilirubin, Indirect CANNOT BE CALCULATED 0.00 - 1.00 mg/dL    Total Protein 5.2 (L) 6.4 - 8.3 g/dL    Globulin NOT REPORTED 1.5 - 3.8 g/dL    Albumin/Globulin Ratio 1.3 1.0 - 2.5   PROTIME-INR   Result Value Ref Range    Protime 11.1 9.0 - 12.0 sec    INR 1.1    APTT   Result Value Ref Range    PTT 20.0 (L) 20.5 - 30.5 sec   Troponin Result Value Ref Range    Troponin, High Sensitivity 25 (H) 0 - 22 ng/L    Troponin T NOT REPORTED <0.03 ng/mL    Troponin Interp NOT REPORTED    TYPE AND SCREEN   Result Value Ref Range    Expiration Date 12/30/2020,2359     Arm Band Number [id number]     ABO/Rh AB POSITIVE     Antibody Screen NEGATIVE     Unit Number [id number]     Product Code Leukocyte Reduced Red Cell     Unit Divison 00     Dispense Status ISSUED     Transfusion Status OK TO TRANSFUSE     Crossmatch Result COMPATIBLE     Unit Number [id number]     Product Code Leukocyte Reduced Red Cell     Unit Divison 00     Dispense Status ISSUED     Transfusion Status OK TO TRANSFUSE     Crossmatch Result COMPATIBLE          RADIOLOGY:  Xr Chest Portable    Result Date: 12/27/2020  EXAMINATION: ONE XRAY VIEW OF THE CHEST 12/27/2020 7:38 am COMPARISON: 06/02/2020 HISTORY: ORDERING SYSTEM PROVIDED HISTORY: hematemesis TECHNOLOGIST PROVIDED HISTORY: hematemesis FINDINGS: Left-sided tripolar cardiac pacer/defibrillator is unchanged in configuration. The lungs are clear. The heart is enlarged with a normal appearance of pulmonary vasculature. There is no pneumothorax or effusion. Cardiomegaly. Cta Abdomen Pelvis W Wo Contrast    Result Date: 12/27/2020  EXAMINATION: CTA OF THE ABDOMEN AND PELVIS WITH AND WITHOUT CONTRAST 12/27/2020 8:40 am: TECHNIQUE: CTA of the abdomen and pelvis was performed without and with the administration of intravenous contrast. Multiplanar reformatted images are provided for review. MIP images are provided for review. Dose modulation, iterative reconstruction, and/or weight based adjustment of the mA/kV was utilized to reduce the radiation dose to as low as reasonably achievable. COMPARISON: None. HISTORY: ORDERING SYSTEM PROVIDED HISTORY: GI Bleed TECHNOLOGIST PROVIDED HISTORY: GI Bleed Reason for Exam: GI bleed Acuity: Acute Type of Exam: Initial FINDINGS: Vascular:  There is moderate diffuse atherosclerotic disease. Normal caliber abdominal aorta showing no evidence for aneurysm or dissection. Normal enhancement of celiac axis and visualized proximal branches. Normal enhancement of SMA visualized proximal branches. ZELDA and iliac arteries enhance normally. Normal enhancement of bilateral renal arteries. Scattered atherosclerotic calcifications in the mesenteric and renal arteries. No active extravasation of contrast is noted. Lower chest: Subsegmental atelectasis/scarring right posterior sulcus. Pacemaker wires. Organs: Tiny layering gallstones. The liver, spleen, pancreas, adrenal glands and kidneys show no significant abnormalities. Several tiny left renal hypodensities too small to characterize likely cysts. GI tract: There is limited evaluation due to absence of oral contrast.  Stomach shows no focal lesions. No evidence for bowel obstruction. No evidence for appendicitis. No acute process in the colon. Pelvis: Mild prostatomegaly. Urinary bladder grossly normal.  Fat containing right inguinal hernia. Peritoneum/Retroperitoneum: No free fluid or significant lymphadenopathy. No free intraperitoneal air. Bones/Soft tissues: Diffuse degenerative changes. 1. Moderate diffuse atherosclerotic disease. No evidence for aneurysm or dissection. No active extravasation of contrast to indicate CT evidence of active bleeding. 2. No acute infective or inflammatory process. 3. No bowel obstruction. EKG  Atrial sensed, ventricular paced rhythm, vent rate 87, right axis deviation, , , QT/QTc 424/510, no ST elevation or depression    All EKG's are interpreted by the Emergency Department Physicianwho either signs or Co-signs this chart in the absence of a cardiologist.      EMERGENCY DEPARTMENT COURSE:  ED Course as of Dec 27 0951   Sun Dec 27, 2020   0630 Currently hypotensive 60s/40s. Patient has poor IV access with one IV to the left hand with 1 L normal saline infusing.   Attempting to CONSULTS:  IP CONSULT TO GI  IP CONSULT TO CRITICAL CARE      FINAL IMPRESSION      1. Gastrointestinal hemorrhage with hematemesis    2. Hypotension due to hypovolemia          DISPOSITION / PLAN     DISPOSITION Admitted 12/27/2020 07:58:47 AM      PATIENT REFERRED TO:  No follow-up provider specified.     DISCHARGE MEDICATIONS:  New Prescriptions    No medications on file       Pastor Isha DO  Emergency Medicine Resident    (Please note that portions of this note were completed with a voice recognition program.Efforts were made to edit the dictations but occasionally words are mis-transcribed.)        Pastor Isha DO  Resident  12/27/20 0137

## 2020-12-27 NOTE — PLAN OF CARE
Problem: Discharge Planning:  Goal: Discharged to appropriate level of care  Description: Discharged to appropriate level of care  Outcome: Ongoing     Problem:  Bowel Function - Altered:  Goal: Bowel elimination is within specified parameters  Description: Bowel elimination is within specified parameters  Outcome: Ongoing     Problem: Fluid Volume - Imbalance:  Goal: Will show no signs and symptoms of excessive bleeding  Description: Will show no signs and symptoms of excessive bleeding  Outcome: Ongoing  Goal: Absence of imbalanced fluid volume signs and symptoms  Description: Absence of imbalanced fluid volume signs and symptoms  Outcome: Ongoing     Problem: Nausea/Vomiting:  Goal: Absence of nausea/vomiting  Description: Absence of nausea/vomiting  Outcome: Ongoing  Goal: Able to drink  Description: Able to drink  Outcome: Ongoing  Goal: Able to eat  Description: Able to eat  Outcome: Ongoing  Goal: Ability to achieve adequate nutritional intake will improve  Description: Ability to achieve adequate nutritional intake will improve  Outcome: Ongoing

## 2020-12-27 NOTE — ED NOTES
Pt came in from by [facility] pt was having increased abdominal pain and nausea pt had 2 episodes of vomiting per ems pt lost about 1 to 1.5 L of blood on the flood. Pt is hypotensive, pale and clammy. IV established, pt placed in Trendelenburg and 1 liter started on a pressure bag. 2nd liter started.  [facility] at bedside with US to establish a 2nd IV. EKG completed, vitals completed. COnnected to [employer]. Labs labeled and sent. Pt a/o x 4.       Stacie Page RN  12/27/20 6346

## 2020-12-27 NOTE — FLOWSHEET NOTE
EGD - 7621 - 0351      1897 - Endoscopy team & Dr. Evon Hargrove at bedside for EGD. Suction equipment available at bedside. Dr. Suha Rhoades informed & aware of low BP prior to procedure. 1352 - Versed 2 mg IVP given per v.o. Dr. Suha Rhoades.  1353 - Fentanyl 50 mcg IVP given per v.o. Dr. Suha Rhoades.  4217 - EGD procedure started. 1403 - EGD procedure ended. VS as charted. Will continue to monitor. See Dr. Yojana Asif note for details. Patient tolerated fairly well.

## 2020-12-27 NOTE — ED NOTES
Dr. Marissa Martinez at bedside re-evaluating patient and updating on poc.      Roger Meek, RN  12/27/20 9306

## 2020-12-28 PROBLEM — F32.A DEPRESSION: Status: ACTIVE | Noted: 2020-12-28

## 2020-12-28 LAB
ABSOLUTE EOS #: 0 K/UL (ref 0–0.4)
ABSOLUTE IMMATURE GRANULOCYTE: 0.83 K/UL (ref 0–0.3)
ABSOLUTE LYMPH #: 2.7 K/UL (ref 1–4.8)
ABSOLUTE MONO #: 0.73 K/UL (ref 0.1–0.8)
ANION GAP SERPL CALCULATED.3IONS-SCNC: 7 MMOL/L (ref 9–17)
BASOPHILS # BLD: 0 % (ref 0–2)
BASOPHILS ABSOLUTE: 0 K/UL (ref 0–0.2)
BUN BLDV-MCNC: 56 MG/DL (ref 8–23)
BUN/CREAT BLD: ABNORMAL (ref 9–20)
CALCIUM IONIZED: 1.15 MMOL/L (ref 1.13–1.33)
CALCIUM SERPL-MCNC: 7.5 MG/DL (ref 8.6–10.4)
CHLORIDE BLD-SCNC: 112 MMOL/L (ref 98–107)
CO2: 22 MMOL/L (ref 20–31)
CREAT SERPL-MCNC: 1.17 MG/DL (ref 0.7–1.2)
DIFFERENTIAL TYPE: ABNORMAL
EOSINOPHILS RELATIVE PERCENT: 0 % (ref 1–4)
ESTIMATED AVERAGE GLUCOSE: 123 MG/DL
GFR AFRICAN AMERICAN: >60 ML/MIN
GFR NON-AFRICAN AMERICAN: >60 ML/MIN
GFR SERPL CREATININE-BSD FRML MDRD: ABNORMAL ML/MIN/{1.73_M2}
GFR SERPL CREATININE-BSD FRML MDRD: ABNORMAL ML/MIN/{1.73_M2}
GLUCOSE BLD-MCNC: 124 MG/DL (ref 75–110)
GLUCOSE BLD-MCNC: 148 MG/DL (ref 75–110)
GLUCOSE BLD-MCNC: 87 MG/DL (ref 70–99)
HBA1C MFR BLD: 5.9 % (ref 4–6)
HCT VFR BLD CALC: 22.5 % (ref 40.7–50.3)
HCT VFR BLD CALC: 22.5 % (ref 40.7–50.3)
HCT VFR BLD CALC: 27.5 % (ref 40.7–50.3)
HEMOGLOBIN: 7 G/DL (ref 13–17)
HEMOGLOBIN: 7.2 G/DL (ref 13–17)
HEMOGLOBIN: 8.4 G/DL (ref 13–17)
IMMATURE GRANULOCYTES: 8 %
LACTIC ACID, WHOLE BLOOD: 1.7 MMOL/L (ref 0.7–2.1)
LYMPHOCYTES # BLD: 26 % (ref 24–44)
MCH RBC QN AUTO: 31.2 PG (ref 25.2–33.5)
MCHC RBC AUTO-ENTMCNC: 32 G/DL (ref 28.4–34.8)
MCV RBC AUTO: 97.4 FL (ref 82.6–102.9)
MONOCYTES # BLD: 7 % (ref 1–7)
MORPHOLOGY: ABNORMAL
MORPHOLOGY: ABNORMAL
MRSA, DNA, NASAL: NORMAL
NRBC AUTOMATED: 1.6 PER 100 WBC
PDW BLD-RTO: 15.4 % (ref 11.8–14.4)
PLATELET # BLD: 235 K/UL (ref 138–453)
PLATELET ESTIMATE: ABNORMAL
PMV BLD AUTO: 9.6 FL (ref 8.1–13.5)
POTASSIUM SERPL-SCNC: 4.2 MMOL/L (ref 3.7–5.3)
RBC # BLD: 2.31 M/UL (ref 4.21–5.77)
RBC # BLD: ABNORMAL 10*6/UL
SEG NEUTROPHILS: 59 % (ref 36–66)
SEGMENTED NEUTROPHILS ABSOLUTE COUNT: 6.14 K/UL (ref 1.8–7.7)
SODIUM BLD-SCNC: 141 MMOL/L (ref 135–144)
SPECIMEN DESCRIPTION: NORMAL
WBC # BLD: 10.4 K/UL (ref 3.5–11.3)
WBC # BLD: ABNORMAL 10*3/UL

## 2020-12-28 PROCEDURE — 83036 HEMOGLOBIN GLYCOSYLATED A1C: CPT

## 2020-12-28 PROCEDURE — 85018 HEMOGLOBIN: CPT

## 2020-12-28 PROCEDURE — 80048 BASIC METABOLIC PNL TOTAL CA: CPT

## 2020-12-28 PROCEDURE — 6370000000 HC RX 637 (ALT 250 FOR IP): Performed by: STUDENT IN AN ORGANIZED HEALTH CARE EDUCATION/TRAINING PROGRAM

## 2020-12-28 PROCEDURE — 85025 COMPLETE CBC W/AUTO DIFF WBC: CPT

## 2020-12-28 PROCEDURE — 99233 SBSQ HOSP IP/OBS HIGH 50: CPT | Performed by: INTERNAL MEDICINE

## 2020-12-28 PROCEDURE — 2580000003 HC RX 258: Performed by: STUDENT IN AN ORGANIZED HEALTH CARE EDUCATION/TRAINING PROGRAM

## 2020-12-28 PROCEDURE — 85014 HEMATOCRIT: CPT

## 2020-12-28 PROCEDURE — 6360000002 HC RX W HCPCS: Performed by: STUDENT IN AN ORGANIZED HEALTH CARE EDUCATION/TRAINING PROGRAM

## 2020-12-28 PROCEDURE — C9113 INJ PANTOPRAZOLE SODIUM, VIA: HCPCS | Performed by: STUDENT IN AN ORGANIZED HEALTH CARE EDUCATION/TRAINING PROGRAM

## 2020-12-28 PROCEDURE — 99232 SBSQ HOSP IP/OBS MODERATE 35: CPT | Performed by: INTERNAL MEDICINE

## 2020-12-28 PROCEDURE — 2060000000 HC ICU INTERMEDIATE R&B

## 2020-12-28 PROCEDURE — 97530 THERAPEUTIC ACTIVITIES: CPT

## 2020-12-28 PROCEDURE — 36415 COLL VENOUS BLD VENIPUNCTURE: CPT

## 2020-12-28 PROCEDURE — 82330 ASSAY OF CALCIUM: CPT

## 2020-12-28 PROCEDURE — 83605 ASSAY OF LACTIC ACID: CPT

## 2020-12-28 PROCEDURE — 82947 ASSAY GLUCOSE BLOOD QUANT: CPT

## 2020-12-28 PROCEDURE — APPSS30 APP SPLIT SHARED TIME 16-30 MINUTES: Performed by: INTERNAL MEDICINE

## 2020-12-28 PROCEDURE — 99291 CRITICAL CARE FIRST HOUR: CPT | Performed by: INTERNAL MEDICINE

## 2020-12-28 PROCEDURE — 97162 PT EVAL MOD COMPLEX 30 MIN: CPT

## 2020-12-28 RX ORDER — GABAPENTIN 300 MG/1
300 CAPSULE ORAL 3 TIMES DAILY
Status: DISCONTINUED | OUTPATIENT
Start: 2020-12-28 | End: 2020-12-30 | Stop reason: HOSPADM

## 2020-12-28 RX ORDER — 0.9 % SODIUM CHLORIDE 0.9 %
20 INTRAVENOUS SOLUTION INTRAVENOUS ONCE
Status: DISCONTINUED | OUTPATIENT
Start: 2020-12-28 | End: 2020-12-28

## 2020-12-28 RX ORDER — ESCITALOPRAM OXALATE 20 MG/1
20 TABLET, FILM COATED ORAL DAILY
COMMUNITY

## 2020-12-28 RX ORDER — NICOTINE POLACRILEX 4 MG
15 LOZENGE BUCCAL PRN
Status: DISCONTINUED | OUTPATIENT
Start: 2020-12-28 | End: 2020-12-30 | Stop reason: HOSPADM

## 2020-12-28 RX ORDER — 0.9 % SODIUM CHLORIDE 0.9 %
20 INTRAVENOUS SOLUTION INTRAVENOUS ONCE
Status: COMPLETED | OUTPATIENT
Start: 2020-12-28 | End: 2020-12-29

## 2020-12-28 RX ORDER — CITALOPRAM 20 MG/1
40 TABLET ORAL DAILY
Status: DISCONTINUED | OUTPATIENT
Start: 2020-12-28 | End: 2020-12-30 | Stop reason: HOSPADM

## 2020-12-28 RX ORDER — FOLIC ACID 1 MG/1
1 TABLET ORAL DAILY
Status: DISCONTINUED | OUTPATIENT
Start: 2020-12-28 | End: 2020-12-30 | Stop reason: HOSPADM

## 2020-12-28 RX ORDER — PREDNISONE 20 MG/1
20 TABLET ORAL DAILY
Status: ON HOLD | COMMUNITY
End: 2020-12-30 | Stop reason: HOSPADM

## 2020-12-28 RX ORDER — MAGNESIUM SULFATE IN WATER 40 MG/ML
2 INJECTION, SOLUTION INTRAVENOUS ONCE
Status: COMPLETED | OUTPATIENT
Start: 2020-12-28 | End: 2020-12-28

## 2020-12-28 RX ORDER — FUROSEMIDE 10 MG/ML
40 INJECTION INTRAMUSCULAR; INTRAVENOUS ONCE
Status: COMPLETED | OUTPATIENT
Start: 2020-12-29 | End: 2020-12-29

## 2020-12-28 RX ORDER — ASPIRIN 81 MG/1
81 TABLET, CHEWABLE ORAL DAILY
Status: DISCONTINUED | OUTPATIENT
Start: 2020-12-28 | End: 2020-12-30 | Stop reason: HOSPADM

## 2020-12-28 RX ORDER — METOLAZONE 2.5 MG/1
2.5 TABLET ORAL WEEKLY
Status: ON HOLD | COMMUNITY
End: 2020-12-30 | Stop reason: HOSPADM

## 2020-12-28 RX ORDER — SPIRONOLACTONE 25 MG/1
25 TABLET ORAL DAILY
COMMUNITY

## 2020-12-28 RX ORDER — DEXTROSE MONOHYDRATE 50 MG/ML
100 INJECTION, SOLUTION INTRAVENOUS PRN
Status: DISCONTINUED | OUTPATIENT
Start: 2020-12-28 | End: 2020-12-30 | Stop reason: HOSPADM

## 2020-12-28 RX ORDER — SODIUM CHLORIDE 9 MG/ML
INJECTION, SOLUTION INTRAVENOUS CONTINUOUS
Status: DISCONTINUED | OUTPATIENT
Start: 2020-12-28 | End: 2020-12-28

## 2020-12-28 RX ORDER — CLOPIDOGREL BISULFATE 75 MG/1
75 TABLET ORAL DAILY
Status: DISCONTINUED | OUTPATIENT
Start: 2020-12-28 | End: 2020-12-30 | Stop reason: HOSPADM

## 2020-12-28 RX ORDER — ATORVASTATIN CALCIUM 80 MG/1
80 TABLET, FILM COATED ORAL NIGHTLY
Status: DISCONTINUED | OUTPATIENT
Start: 2020-12-28 | End: 2020-12-30 | Stop reason: HOSPADM

## 2020-12-28 RX ORDER — DEXTROSE MONOHYDRATE 25 G/50ML
12.5 INJECTION, SOLUTION INTRAVENOUS PRN
Status: DISCONTINUED | OUTPATIENT
Start: 2020-12-28 | End: 2020-12-30 | Stop reason: HOSPADM

## 2020-12-28 RX ADMIN — GABAPENTIN 300 MG: 300 CAPSULE ORAL at 20:02

## 2020-12-28 RX ADMIN — INSULIN LISPRO 1 UNITS: 100 INJECTION, SOLUTION INTRAVENOUS; SUBCUTANEOUS at 20:02

## 2020-12-28 RX ADMIN — MAGNESIUM SULFATE 2 G: 2 INJECTION INTRAVENOUS at 09:49

## 2020-12-28 RX ADMIN — ATORVASTATIN CALCIUM 80 MG: 80 TABLET, FILM COATED ORAL at 20:02

## 2020-12-28 RX ADMIN — PANTOPRAZOLE SODIUM 8 MG/HR: 40 INJECTION, POWDER, FOR SOLUTION INTRAVENOUS at 12:30

## 2020-12-28 RX ADMIN — FOLIC ACID 1 MG: 1 TABLET ORAL at 09:49

## 2020-12-28 RX ADMIN — SODIUM CHLORIDE: 9 INJECTION, SOLUTION INTRAVENOUS at 08:30

## 2020-12-28 RX ADMIN — PANTOPRAZOLE SODIUM 8 MG/HR: 40 INJECTION, POWDER, FOR SOLUTION INTRAVENOUS at 01:50

## 2020-12-28 RX ADMIN — GABAPENTIN 300 MG: 300 CAPSULE ORAL at 14:00

## 2020-12-28 RX ADMIN — CLOPIDOGREL 75 MG: 75 TABLET, FILM COATED ORAL at 09:49

## 2020-12-28 RX ADMIN — SODIUM CHLORIDE, PRESERVATIVE FREE 10 ML: 5 INJECTION INTRAVENOUS at 09:30

## 2020-12-28 RX ADMIN — ASPIRIN 81 MG: 81 TABLET, CHEWABLE ORAL at 09:49

## 2020-12-28 RX ADMIN — HYDROCODONE BITARTRATE AND ACETAMINOPHEN 1 TABLET: 5; 325 TABLET ORAL at 09:49

## 2020-12-28 RX ADMIN — CITALOPRAM 40 MG: 20 TABLET, FILM COATED ORAL at 09:49

## 2020-12-28 RX ADMIN — SACUBITRIL AND VALSARTAN 1 TABLET: 49; 51 TABLET, FILM COATED ORAL at 09:49

## 2020-12-28 RX ADMIN — HYDROCODONE BITARTRATE AND ACETAMINOPHEN 1 TABLET: 5; 325 TABLET ORAL at 19:10

## 2020-12-28 RX ADMIN — GABAPENTIN 300 MG: 300 CAPSULE ORAL at 09:30

## 2020-12-28 ASSESSMENT — PAIN DESCRIPTION - LOCATION: LOCATION: BACK

## 2020-12-28 ASSESSMENT — PAIN SCALES - GENERAL
PAINLEVEL_OUTOF10: 4
PAINLEVEL_OUTOF10: 7
PAINLEVEL_OUTOF10: 7
PAINLEVEL_OUTOF10: 0
PAINLEVEL_OUTOF10: 4

## 2020-12-28 ASSESSMENT — PAIN DESCRIPTION - PROGRESSION
CLINICAL_PROGRESSION: NOT CHANGED

## 2020-12-28 ASSESSMENT — PAIN DESCRIPTION - DESCRIPTORS: DESCRIPTORS: ACHING;SORE

## 2020-12-28 ASSESSMENT — PAIN DESCRIPTION - ONSET: ONSET: ON-GOING

## 2020-12-28 ASSESSMENT — PAIN DESCRIPTION - PAIN TYPE: TYPE: CHRONIC PAIN

## 2020-12-28 ASSESSMENT — PAIN DESCRIPTION - FREQUENCY: FREQUENCY: CONTINUOUS

## 2020-12-28 NOTE — FLOWSHEET NOTE
12/28/20 0032   Provider Notification   Reason for Communication Evaluate   Provider Name Dr. Vines Samples   Provider Notification Resident   Method of Communication Face to face   Response In department   Notification Time 0032     Informed BP's mid to high 80's and MAP's 50's. States pt normally runs lower and he's ok with BP's.

## 2020-12-28 NOTE — CARE COORDINATION
Case Management Initial Discharge Plan  Soo Keith             Met with:patient to discuss discharge plans. Information verified: address, contacts, phone number, , insurance Yes    Emergency Contact/Next of Kin name & number: Ingrid Chau (wife) [phone]    PCP: Feliz Mtz MD  Date of last visit: 2 weeks ago    Insurance Provider: Medicare and Barbara Keita Medicare    Discharge Planning    Living Arrangements:  Spouse/Significant Other   Support Systems:  Spouse/Significant Other, Family Members    Home has 2 stories   stairs to climb to get into front door, stairs to climb to reach second floor  Location of bedroom/bathroom in home     Patient able to perform ADL's:Independent    Current Services (outpatient & in home) none  DME equipment: walker, glucometer  DME provider: unsure    Receiving oral anticoagulation therapy? No    If indicated:   Physician managing anticoagulation treatment:   Where does patient obtain lab work for ATC treatment? Potential Assistance Needed:  N/A    Patient agreeable to home care: No  [place] of choice provided:  n/a    Prior SNF/Rehab Placement and Facility:   Agreeable to SNF/Rehab: No  [place] of choice provided: n/a     Evaluation: no    Expected Discharge date:  20    Patient expects to be discharged to:  home independently with wife  Follow Up Appointment: Best Day/ Time: Monday AM    Transportation provider:   Transportation arrangements needed for discharge: No. Wife will transport    Readmission Risk              Risk of Unplanned Readmission:        17             Does patient have a readmission risk score greater than 14?: Yes  If yes, follow-up appointment must be made within 7 days of discharge.      Goals of Care: increased comfort      Discharge Plan: home independently          Electronically signed by João Euceda RN on 20 at 10:01 AM EST

## 2020-12-28 NOTE — PLAN OF CARE
Problem: Nausea/Vomiting:  Goal: Absence of nausea/vomiting  Description: Absence of nausea/vomiting  Outcome: Met This Shift  Goal: Able to drink  Description: Able to drink  Outcome: Met This Shift  Goal: Able to eat  Description: Able to eat  Outcome: Met This Shift     Problem: Discharge Planning:  Goal: Discharged to appropriate level of care  Description: Discharged to appropriate level of care  Outcome: Ongoing     Problem:  Bowel Function - Altered:  Goal: Bowel elimination is within specified parameters  Description: Bowel elimination is within specified parameters  Outcome: Ongoing     Problem: Fluid Volume - Imbalance:  Goal: Will show no signs and symptoms of excessive bleeding  Description: Will show no signs and symptoms of excessive bleeding  Outcome: Ongoing  Goal: Absence of imbalanced fluid volume signs and symptoms  Description: Absence of imbalanced fluid volume signs and symptoms  Outcome: Ongoing     Problem: Nausea/Vomiting:  Goal: Ability to achieve adequate nutritional intake will improve  Description: Ability to achieve adequate nutritional intake will improve  Outcome: Ongoing     Problem: Pain:  Goal: Pain level will decrease  Description: Pain level will decrease  Outcome: Ongoing  Goal: Control of acute pain  Description: Control of acute pain  Outcome: Ongoing  Goal: Control of chronic pain  Description: Control of chronic pain  Outcome: Ongoing

## 2020-12-28 NOTE — PROGRESS NOTES
Critical care team - Resident sign-out to medicine service      Date and time: 12/28/2020 12:59 PM  Patient's name:  Leyla Chand Record Number: [id number]  Patient's account/billing number: [de-identified]  Patient's YOB: 1945  Age: 75 y.o. Date of Admission: 12/27/2020  5:51 AM  Length of stay during current admission: 1    Primary Care Physician: Rosendo Barreto MD    Code Status: Full Code    Mode of physician to physician communication:        [] Via telephone   [x] In person     Date and time of sign-out: 12/28/2020 12:59 PM    Accepting Internal Medicine resident: Dr. Taylor Grey    Accepting Medicine team: IM Team 3    Accepting team's attending: Dr. Nestor Miranda current ICU Bed:  108     Patient's assigned bed on floor:  422        [] Med-Surg Monitored [x] Step-down       [] Psychiatry ICU       [] Psych floor     Reason for ICU admission:     Hemodynamic instability     ICU course summary:     Kady Peña is a 75 y.o. with past medical history of CAD status post stent on aspirin and Plavix, history of V. tach with PPM, diabetes mellitus and hypertension who presented to ED with reports of melena hematemesis.     As per patient he had episode of hematemesis started early this morning around 4 AM along with melena last evening. Patient has hematemesis of both dark red and bright red blood. Patient never had EGD and colonoscopy before patient never had an episode of bloody bowel movement or vomiting ever before according to ED, EMS estimated blood loss was around 1 L with some clots. Patient denied any NSAID use other than low-dose aspirin, denies any significant alcohol intake.     On arrival patient was hypotensive and received total 2 L of fluid bolus. He was a started on PPI drip along with octreotide. Hemoglobin around a week ago was 12 and during this admission hemoglobin was 7.5.   Patient received total of 2 units of PRBC as per GI.     Patient denies any epigastric pain, chest pain, nausea, dizziness, leg pain, leg swelling, shortness of breath but endorses feeling weak and tired.     Of note patient was diagnosed with Covid and 11/23/2020. Patient was not treated for that.     CTA abdomen was done which was negative for acute GI bleed. GI consulted from ER. Underwent EGD yesterday showed 2 ulcers in the esophagus with visible bleeding from vessels which was banded with 2 bands. Small hiatal hernia. Patient has started on clears. Aspirin and Plavix were resumed. Octreotide drip was stopped. Denies any nausea, vomiting, diarrhea, abdominal pain. Ok for liquid diet, monitor Hgb. Procedures during patient's ICU stay:     EGD    Current Vitals:     BP (!) 90/46   Pulse 72   Temp 98.8 °F (37.1 °C) (Oral)   Resp 14   Ht 5' 8\" (1.727 m)   Wt 213 lb 6.5 oz (96.8 kg)   SpO2 96%   BMI 32.45 kg/m²       Cultures:     Blood cultures:                 [] None drawn      [] Negative             []  Positive (Details:  )  Urine Culture:                   [] None drawn      [] Negative             []  Positive (Details:  )  Sputum Culture:               [] None drawn       [] Negative             []  Positive (Details:  )   Endotracheal aspirate:     [] None drawn       [] Negative             []  Positive (Details:  )       Consults:     1.  GI    Assessment:     Patient Active Problem List    Diagnosis Date Noted    H/O ventricular tachycardia 03/25/2017     Priority: High    GI bleed 12/27/2020    S/P ICD (internal cardiac defibrillator) procedure 06/01/2020    S/P angioplasty with stent 02/12/2020    V tach ([facility] [address] 01/20/2017    AICD discharge 01/18/2017    Syncope 01/18/2017    Hypocalcemia 01/18/2017    Hypophosphatemia 01/18/2017    Essential hypertension     Hyperlipidemia     Type 2 diabetes mellitus without complication, without long-term current use of insulin (HCC)     CAD (coronary artery disease)     Arthritis          Recommended Follow-up: 1. Acute upper GI bleed leading to blood loss anemia and hemorrhagic shock.    Hemoglobin is stable 7.1, underwent EGD yesterday which showed 2 bleeding ulcers which were banded. On Protonix. Octreotide DC'd. Okay for full liquid diet. Blood pressure on the softer side. Did not require any pressure support.       2. Chronic ischemic cardiomyopathy/systolic heart failure with ejection fraction of 35%.  Fluid resuscitation with caution.  Will hold Bumex for now.        3. History of CAD with stents on DAPT. resumed dual antiplatelet therapy. Monitor on DAPT.     4. DARSHANA.    Resolved.   stop fluids.        Franco Zarate MD  PGY-2, Internal medicine resident  [place], [place]

## 2020-12-28 NOTE — PROGRESS NOTES
INTERNAL MEDICINE                                                                             ICU PATIENT TRANSFER NOTE        Patient:  Skye English  YOB: 1945  MRN: [id number]     Acct: [de-identified]     Admit date: 12/27/2020    Code Status:-  Full code     Reason for ICU Admission:-   Upper GI bleed    SUPPORT DEVICES: [] Ventilator [] BIPAP  [] Nasal Cannula [x] Room Air    Consultations:- [] Cardiology [] Nephrology  [] Hemo onco  [x] GI                               [] ID [] ENT  [] Rheum [] Endo   []Physiotherapy                                 Others:-     NUTRITION:  [] NPO [] Tube Feeding (Specify: ) [] TPN  [x] PO    Central Lines:- [x] No   [] Yes           If yes - Days/Date of Insertion. Pt seen and Chart reviewed. Patient was admitted to ICU for upper GI bleed that was followed with EGD that showed 2 small ulcers with visible vessel and 2 bands were applied. Patient was started on Protonix drip. Patient is getting transferred out of ICU. At this time, patient blood pressure is running on lower side but otherwise hemodynamically stable. He is maintaining saturation on room air. He had dark stool bowel movement this morning but no blood. He denies chest pain, shortness of breath, vomiting, fever, dysuria or headache. He is on full liquid diet and is able to tolerate that without nausea/vomiting. ICU COURSE :-     Sonya Freed a 75 y. o. with past medical history of CAD status post stent on aspirin and Plavix, ischemic cardiomyopathy and history of V. tach s/p AICD, diabetes mellitus and hypertension who presented to ED with reports of melena and hematemesis.     As per patient he had episode of hematemesis on the morning of presentation with melena last evening.  Patient has hematemesis of both dark red and bright red blood.  Patient never had EGD and colonoscopy before patient never had an episode of bloody bowel movement or vomiting ever before according to ED, EMS estimated blood loss was around 1 L with some clots.  Patient denied any NSAID use other than low-dose aspirin, denies any significant alcohol intake.     On arrival patient was hypotensive and received total 2 L of fluid bolus.  He was a started on PPI drip along with octreotide.  Hemoglobin around a week ago was 12 and during this admission hemoglobin was 7.5.  Patient received total of 2 units of PRBC as per GI.     Patient denies any epigastric pain, chest pain, nausea, dizziness, leg pain, leg swelling, shortness of breath but endorses feeling weak and tired.     Of note patient was diagnosed with Covid and 11/23/2020.  Patient was not treated for that.     CTA abdomen was done which was negative for acute GI bleed.  GI consulted from ER.     Underwent EGD that showed 2 ulcers in the esophagus with visible bleeding from vessels which were banded with 2 bands.  Small hiatal hernia. Patient was started on clear liquid diet with transition to full liquid diet and he tolerated it well. Aspirin and Plavix were resumed.  Octreotide drip was stopped.  Denies any nausea, vomiting, diarrhea, abdominal pain. Ok for liquid diet, monitor Hgb. Physical Exam:   Vitals: BP (!) 99/50   Pulse 72   Temp 97.6 °F (36.4 °C) (Oral)   Resp 18   Ht 5' 8\" (1.727 m)   Wt 213 lb 6.5 oz (96.8 kg)   SpO2 97%   BMI 32.45 kg/m²   24 hour intake/output:    Intake/Output Summary (Last 24 hours) at 12/28/2020 1645  Last data filed at 12/28/2020 1400  Gross per 24 hour   Intake 2650 ml   Output 2260 ml   Net 390 ml     Last 3 weights:   Wt Readings from Last 3 Encounters:   12/27/20 213 lb 6.5 oz (96.8 kg)   06/01/20 226 lb (102.5 kg)   02/13/20 211 lb 3.2 oz (95.8 kg)       General appearance: alert and cooperative with exam  HEENT: Head: Normocephalic, no lesions, without obvious abnormality. Neck: no adenopathy, no carotid bruit, no JVD, supple, symmetrical, trachea midline and thyroid not enlarged, symmetric, no tenderness/mass/nodules  Lungs: clear to auscultation bilaterally  Heart: regular rate and rhythm, S1, S2 normal, no murmur, click, rub or gallop  Abdomen: soft, non-tender; bowel sounds normal; no masses,  no organomegaly  Extremities: extremities normal, atraumatic, no cyanosis or edema  Neurologic: Mental status: Alert, oriented, thought content appropriate    Medications:Current Inpatient  Scheduled Meds:   clopidogrel  75 mg Oral Daily    citalopram  40 mg Oral Daily    atorvastatin  80 mg Oral Nightly    aspirin  81 mg Oral Daily    folic acid  1 mg Oral Daily    gabapentin  300 mg Oral TID    insulin lispro  0-12 Units Subcutaneous TID WC    insulin lispro  0-6 Units Subcutaneous Nightly    sodium chloride  250 mL Intravenous Once    sodium chloride flush  10 mL Intravenous 2 times per day     Continuous Infusions:   dextrose      pantoprozole (PROTONIX) infusion 8 mg/hr (12/28/20 1230)     PRN Meds:glucose, dextrose, glucagon (rDNA), dextrose, sodium chloride flush, promethazine **OR** ondansetron, polyethylene glycol, acetaminophen **OR** acetaminophen, potassium chloride **OR** potassium alternative oral replacement **OR** potassium chloride, magnesium sulfate, potassium chloride, HYDROcodone-acetaminophen    Objective:    CBC:   Recent Labs     12/27/20  0603 12/27/20  0603 12/27/20  2253 12/28/20  0508 12/28/20  1145   WBC 22.5*  --   --  10.4  --    HGB 7.5*   < > 7.5* 7.2* 8.4*     --   --  235  --     < > = values in this interval not displayed.      BMP:    Recent Labs     12/27/20  0603 12/28/20  0508    141   K 4.6 4.2    112*   CO2 15* 22   BUN 71* 56*   CREATININE 1.30* 1.17   GLUCOSE 220* 87     Calcium:  Recent Labs     12/28/20  0508   CALCIUM 7.5*     Ionized Calcium:No results for input(s): IONCA in the last 72 hours.  Magnesium:No results for input(s): MG in the last 72 hours. Phosphorus:No results for input(s): PHOS in the last 72 hours. BNP:No results for input(s): BNP in the last 72 hours. Glucose:  Recent Labs     12/27/20  1658   POCGLU 176*     HgbA1C:   Recent Labs     12/28/20  1145   LABA1C 5.9     INR:   Recent Labs     12/27/20  0617   INR 1.1     Hepatic:   Recent Labs     12/27/20  0617   ALKPHOS 51   ALT 21   AST 22   PROT 5.2*   BILITOT 0.24*   BILIDIR <0.08   LABALBU 2.9*     Amylase and Lipase:No results for input(s): LACTA, AMYLASE in the last 72 hours. Lactic Acid: No results for input(s): LACTA in the last 72 hours. CARDIAC ENZYMES:No results for input(s): CKTOTAL, CKMB, CKMBINDEX, TROPONINI in the last 72 hours. BNP: No results for input(s): BNP in the last 72 hours. Lipids: No results for input(s): CHOL, TRIG, HDL, LDLCALC in the last 72 hours. Invalid input(s): LDL  ABGs: No results found for: PH, PCO2, PO2, HCO3, O2SAT  Thyroid:   Lab Results   Component Value Date    TSH 2.44 01/20/2017        Urinalysis: No results for input(s): BACTERIA, BLOODU, CLARITYU, COLORU, PHUR, PROTEINU, RBCUA, SPECGRAV, BILIRUBINUR, NITRU, WBCUA, LEUKOCYTESUR, GLUCOSEU in the last 72 hours. Assessment:  Patient Active Problem List   Diagnosis    AICD discharge    Syncope    Essential hypertension    Hyperlipidemia    Type 2 diabetes mellitus without complication, without long-term current use of insulin (HCC)    CAD (coronary artery disease)    Arthritis    Hypocalcemia    Hypophosphatemia    V tach ([address]    H/O ventricular tachycardia    S/P angioplasty with stent    S/P ICD (internal cardiac defibrillator) procedure    GI bleed           Plan:    Acute upper GI bleed leading to blood loss anemia and hemorrhagic shock. EGD 12/27 showed 2 bleeding ulcers which were banded. Continue Protonix drip for now. Continue full liquid diet and advance as tolerated.      Acute blood loss anemia

## 2020-12-28 NOTE — PROGRESS NOTES
[facility] Gastroenterology Progress Note    Isidro Mcguire is a 75 y.o. male patient. Hospitalization Day:1      Chief consult reason: Hematemesis and melena      Subjective: Patient seen and examined. Patient's had no further episodes of nausea, vomiting, or melena. Patient denies any abdominal or epigastric pain. Tolerating clear liquid diet    Objective:   VITALS:  BP (!) 90/46   Pulse 72   Temp 98.8 °F (37.1 °C) (Oral)   Resp 14   Ht 5' 8\" (1.727 m)   Wt 213 lb 6.5 oz (96.8 kg)   SpO2 96%   BMI 32.45 kg/m²   TEMPERATURE:  Current - Temp: 98.8 °F (37.1 °C);  Max - Temp  Av °F (36.7 °C)  Min: 97.4 °F (36.3 °C)  Max: 98.9 °F (37.2 °C)    Physical Assessment:  General appearance:  alert, cooperative and no distress  Mental Status:  oriented to person, place and time and normal affect  Lungs:  clear to auscultation bilaterally, normal effort  Heart:  regular rate and rhythm, no murmur  Abdomen:  soft, nontender, nondistended, normal bowel sounds, no masses  Extremities:  no edema, no redness, No clubbing  Skin:  warm, dry, no gross lesions or rashes    CURRENT MEDICATIONS:  Scheduled Meds:   sodium chloride  250 mL Intravenous Once    sodium chloride flush  10 mL Intravenous 2 times per day     Continuous Infusions:   pantoprozole (PROTONIX) infusion 8 mg/hr (20 0150)     PRN Meds:sodium chloride flush, promethazine **OR** ondansetron, polyethylene glycol, acetaminophen **OR** acetaminophen, potassium chloride **OR** potassium alternative oral replacement **OR** potassium chloride, magnesium sulfate, potassium chloride, HYDROcodone-acetaminophen      Data Review:  LABS and IMAGING:     CBC  Recent Labs     20  0603 20  1106 20  1835 20  2253 20  0508   WBC 22.5*  --   --   --  10.4   HGB 7.5* 8.1* 8.1* 7.5* 7.2*   HCT 25.7* 25.0* 27.1* 23.0* 22.5*   .4*  --   --   --  97.4   MCHC 29.2  --   --   --  32.0   RDW 12.8  --   --   --  15.4*    --   --   --  235       Immature PLTs   No results found for: PLTFLUORE    ANEMIA STUDIES  No results for input(s): LABIRON, TIBC, IRON, FERRITIN, RIVYBBGL29, FOLATE, OCCULTBLD in the last 72 hours. BMP  Recent Labs     12/27/20  0603 12/28/20  0508    141   K 4.6 4.2    112*   CO2 15* 22   BUN 71* 56*   CREATININE 1.30* 1.17   GLUCOSE 220* 87   CALCIUM 8.2* 7.5*       LFTS  Recent Labs     12/27/20  0603 12/27/20  0617   ALKPHOS 51 51   ALT 21 21   AST 25 22   BILITOT 0.24* 0.24*   BILIDIR  --  <0.08   LABALBU 2.7* 2.9*       AMYLASE/LIPASE/AMMONIA  Recent Labs     12/27/20  0617   LIPASE 86*   AMMONIA 14*       Acute Hepatitis Panel   No results found for: HEPBSAG, HEPCAB, HEPBIGM, HEPAIGM    HCV Genotype   No results found for: HEPATITISCGENOTYPE    HCV Quantitative   No results found for: HCVQNT    LIVER WORK UP:    AFP  No results found for: AFP    Alpha 1 antitrypsin   No results found for: A1A    Anti - Liver/Kidney Ab  No results found for: LIVER-KIDNEYMICROSOMALAB    ADAM  No results found for: ADAM    AMA  No results found for: MITOAB    ASMA  No results found for: SMOOTHMUSCAB    Ceruloplasmin  No results found for: CERULOPLSM    Celiac panel  No results found for: TISSTRNTIIGG, TTGIGA, IGA    IgG  No results found for: IGG    IgM  No results found for: IGM    GGT   No results found for: LABGGT    PT/INR  Recent Labs     12/27/20  0617   PROTIME 11.1   INR 1.1       Cancer Markers:  CEA:  No results for input(s): CEA in the last 72 hours. Ca 125:  No results for input(s):  in the last 72 hours. Ca 19-9:   No results for input(s):  in the last 72 hours. AFP: No results for input(s): AFP in the last 72 hours. Lactic acid:No results for input(s): LACTACIDWB in the last 72 hours.       Radiology Review:    Xr Chest Portable    Result Date: 12/27/2020  EXAMINATION: ONE XRAY VIEW OF THE CHEST 12/27/2020 7:38 am COMPARISON: 06/02/2020 HISTORY: ORDERING SYSTEM PROVIDED HISTORY: hematemesis TECHNOLOGIST PROVIDED HISTORY: hematemesis FINDINGS: Left-sided tripolar cardiac pacer/defibrillator is unchanged in configuration. The lungs are clear. The heart is enlarged with a normal appearance of pulmonary vasculature. There is no pneumothorax or effusion. Cardiomegaly. Cta Abdomen Pelvis W Wo Contrast    Result Date: 12/27/2020  EXAMINATION: CTA OF THE ABDOMEN AND PELVIS WITH AND WITHOUT CONTRAST 12/27/2020 8:40 am: TECHNIQUE: CTA of the abdomen and pelvis was performed without and with the administration of intravenous contrast. Multiplanar reformatted images are provided for review. MIP images are provided for review. Dose modulation, iterative reconstruction, and/or weight based adjustment of the mA/kV was utilized to reduce the radiation dose to as low as reasonably achievable. COMPARISON: None. HISTORY: ORDERING SYSTEM PROVIDED HISTORY: GI Bleed TECHNOLOGIST PROVIDED HISTORY: GI Bleed Reason for Exam: GI bleed Acuity: Acute Type of Exam: Initial FINDINGS: Vascular: There is moderate diffuse atherosclerotic disease. Normal caliber abdominal aorta showing no evidence for aneurysm or dissection. Normal enhancement of celiac axis and visualized proximal branches. Normal enhancement of SMA visualized proximal branches. ZELDA and iliac arteries enhance normally. Normal enhancement of bilateral renal arteries. Scattered atherosclerotic calcifications in the mesenteric and renal arteries. No active extravasation of contrast is noted. Lower chest: Subsegmental atelectasis/scarring right posterior sulcus. Pacemaker wires. Organs: Tiny layering gallstones. The liver, spleen, pancreas, adrenal glands and kidneys show no significant abnormalities. Several tiny left renal hypodensities too small to characterize likely cysts. GI tract: There is limited evaluation due to absence of oral contrast.  Stomach shows no focal lesions. No evidence for bowel obstruction.   No evidence for appendicitis. No acute process in the colon. Pelvis: Mild prostatomegaly. Urinary bladder grossly normal.  Fat containing right inguinal hernia. Peritoneum/Retroperitoneum: No free fluid or significant lymphadenopathy. No free intraperitoneal air. Bones/Soft tissues: Diffuse degenerative changes. 1. Moderate diffuse atherosclerotic disease. No evidence for aneurysm or dissection. No active extravasation of contrast to indicate CT evidence of active bleeding. 2. No acute infective or inflammatory process. 3. No bowel obstruction. ENDOSCOPY     Procedure:                 Esophagogastroduodenoscopy with control of bleeding  Date:                           12/27/2020   Endoscopist:             Ravinder Muse MD, [facility]     Indication:   Hematemesis and Melena     Postprocedure diagnosis:  Esophagitis with small esophageal ulcers with vessel- banded X2    Findings[de-identified]   Esophagus: The esophagus had two small ulcers at GE junction with visible vessel. This were banded with 2 bands. There was no bleeding at the end of the procedure. The esophagus had non-obstructing Schatzki ring. The esophagus was otherwise normal.   Stomach: The stomach had small hiatal hernia. Stomach had large blood clot limiting exam of fundus. The rest of the stomach was otherwise normal.   Duodenum: normal     Recommendations: -Acid suppression with a proton pump inhibitor drip.  - Ok for clears.   - OK for ASA/Plavix. - D/C Octreotide drip.      Electronically signed by Ravinder Muse MD, FACG  on 12/27/2020 at 2:07 PM        Active Problems:    GI bleed  Resolved Problems:    * No resolved hospital problems. *       GI Assessment:    1. Upper GI bleed with hematemesis and melena. - 12/27/2020 - EGD -2 small ulcers at the GE junction with visible vessel s/p banding x 2  2. Acute blood loss anemia  3. Hypotension - secondary to acute blood loss  4. Hx of CAD with stents on DAPT  5. Recent hx of COVID 19 - 11/23/2020  6.

## 2020-12-28 NOTE — PROGRESS NOTES
Critical Care Team - Daily Progress Note      Date and time: 12/28/2020 8:31 AM  Patient's name:  Lyn Jaimes  Medical Record Number: [id number]  Patient's account/billing number: [de-identified]  Patient's YOB: 1945  Age: 75 y.o. Date of Admission: 12/27/2020  5:51 AM  Length of stay during current admission: 1      Primary Care Physician: Bola Mars MD  ICU Attending Physician: Dr. Juan Carlos Thomson Status: Full Code    Reason for ICU admission:   Chief Complaint   Patient presents with    Hematemesis         SUBJECTIVE:     OVERNIGHT EVENTS:       No acute issues overnight, this morning hemoglobin is 7.2. Blood pressure on the softer side, did not require any pressor support. Underwent EGD yesterday showed 2 ulcers in the esophagus with visible bleeding from vessels which was banded with 2 bands. Small hiatal hernia. Patient has started on clears. Aspirin and Plavix were resumed. Octreotide drip was stopped. Denies any nausea, vomiting, diarrhea, abdominal pain. On room air. QTc 510.   AWAKE & FOLLOWING COMMANDS:  [] No   [x] Yes    CURRENT VENTILATION STATUS:     [] Ventilator  [] BIPAP  [] Nasal Cannula [x] Room Air      IF INTUBATED, ET TUBE MARKING AT LOWER LIP:       cms    SECRETIONS Amount:  [] Small [] Moderate  [] Large  [x] None  Color:     [] White [] Colored  [] Bloody    SEDATION:  RAAS Score:  [] Propofol gtt  [] Versed gtt  [] Ativan gtt   [] No Sedation    PARALYZED:  [x] No    [] Yes    DIARRHEA:                [x] No                [] Yes  (C. Difficile status: [] positive                                                                                                                       [] negative                                                                                                                     [] pending)    VASOPRESSORS:  [x] No    [] Yes    If yes -   [] Levophed       [] Dopamine     [] Vasopressin       [] Dobutamine  [] Phenylephrine         [] Epinephrine    CENTRAL LINES:     [] No   [] Yes   (Date of Insertion:   )           If yes -     [] Right IJ     [] Left IJ [] Right Femoral [] Left Femoral                   [] Right Subclavian [] Left Subclavian       MADRID'S CATHETER:   [x] No   [] Yes  (Date of Insertion:   )     URINE OUTPUT:            [x] Good   [] Low              [] Anuric      OBJECTIVE:     VITAL SIGNS:  BP (!) 90/46   Pulse 72   Temp 98.8 °F (37.1 °C) (Oral)   Resp 14   Ht 5' 8\" (1.727 m)   Wt 213 lb 6.5 oz (96.8 kg)   SpO2 96%   BMI 32.45 kg/m²   Tmax over 24 hours:  Temp (24hrs), Av.2 °F (36.8 °C), Min:97.6 °F (36.4 °C), Max:98.9 °F (37.2 °C)      Patient Vitals for the past 8 hrs:   BP Temp Temp src Pulse Resp SpO2   20 0600 (!) 90/46 -- -- 72 14 96 %   20 0530 (!) 96/57 -- -- 68 12 97 %   20 0500 (!) 101/46 -- -- 71 18 98 %   20 0430 (!) 84/45 -- -- 76 14 98 %   20 0401 -- -- -- -- 16 --   20 0400 (!) 94/41 98.8 °F (37.1 °C) Oral 73 -- 97 %   20 0330 (!) 87/39 -- -- 75 -- 98 %   20 0329 -- -- -- -- 14 --   20 0300 (!) 87/36 -- -- 70 15 98 %   20 0230 (!) 93/36 -- -- 66 11 97 %   20 0200 98/84 -- -- 72 15 97 %   20 0145 (!) 98/47 -- -- 78 18 97 %   20 0130 (!) 97/41 -- -- 74 -- 97 %   20 0115 (!) 93/52 -- -- 75 15 95 %   20 0100 (!) 85/42 -- -- 77 10 95 %         Intake/Output Summary (Last 24 hours) at 2020 0831  Last data filed at 2020 0438  Gross per 24 hour   Intake 4309.45 ml   Output 2000 ml   Net 2309.45 ml     Date 20 0000 - 20 2359   Shift 9251-9786 3604-0496 6109-3181 24 Hour Total   INTAKE   I.V.(mL/kg) 745(7.7)   745(7.7)   Shift Total(mL/kg) 745(7.7)   745(7.7)   OUTPUT   Urine(mL/kg/hr) 650(0.8)   650   Shift Total(mL/kg) 650(6.7)   650(6.7)   Weight (kg) 96.8 96.8 96.8 96.8     Wt Readings from Last 3 Encounters:   20 213 lb 6.5 oz (96.8 kg)   20 226 lb (102.5 kg)   20 211 lb 3.2 oz (95.8 kg)     Body mass index is 32.45 kg/m². PHYSICAL EXAM:  Constitutional: Appears well, no distress  EENT: PERRLA, EOMI, sclera clear, anicteric, oropharynx clear, no lesions, neck supple with midline trachea. Neck: Supple, symmetrical, trachea midline, no adenopathy, thyroid symmetric, no jvd skin normal  Respiratory: clear to auscultation, no wheezes or rales and unlabored breathing. No intercostal tenderness  Cardiovascular: regular rate and rhythm, normal S1, S2, no murmur noted and 2+ pulses throughout  Abdomen: soft, nontender, nondistended, no masses or organomegaly  NEUROLOGIC: Awake, alert, oriented to name, place and time. Cranial nerves II-XII are grossly intact. Motor is 5 out of 5 bilaterally. Sensory is intact.   Extremities:  peripheral pulses normal, no pedal edema, no clubbing or cyanosis  SKIN: normal coloration and turgor    Any additional physical findings:      MEDICATIONS:  Scheduled Meds:   sodium chloride  250 mL Intravenous Once    sodium chloride flush  10 mL Intravenous 2 times per day     Continuous Infusions:   sodium chloride      pantoprozole (PROTONIX) infusion 8 mg/hr (12/28/20 0150)     PRN Meds:       sodium chloride flush, 10 mL, PRN      promethazine, 12.5 mg, Q6H PRN    Or      ondansetron, 4 mg, Q6H PRN      polyethylene glycol, 17 g, Daily PRN      acetaminophen, 650 mg, Q6H PRN    Or      acetaminophen, 650 mg, Q6H PRN      potassium chloride, 40 mEq, PRN    Or      potassium alternative oral replacement, 40 mEq, PRN    Or      potassium chloride, 10 mEq, PRN      magnesium sulfate, 2 g, PRN      potassium chloride, 10 mEq, PRN      HYDROcodone-acetaminophen, 1 tablet, Q6H PRN        SUPPORT DEVICES: [] Ventilator [] BIPAP  [] Nasal Cannula [x] Room Air      DATA:  Complete Blood Count:   Recent Labs     12/27/20  0603 12/27/20  0603 12/27/20  1835 12/27/20  2253 12/28/20  0508   WBC 22.5*  --   --   --  10.4   HGB 7.5*   < > 8.1* 7.5* 7.2* .4*  --   --   --  97.4     --   --   --  235   RBC 2.35*  --   --   --  2.31*   HCT 25.7*   < > 27.1* 23.0* 22.5*   MCH 31.9  --   --   --  31.2   MCHC 29.2  --   --   --  32.0   RDW 12.8  --   --   --  15.4*   MPV 10.0  --   --   --  9.6    < > = values in this interval not displayed. PT/INR:    Lab Results   Component Value Date    PROTIME 11.1 12/27/2020    INR 1.1 12/27/2020     PTT:    Lab Results   Component Value Date    APTT 20.0 12/27/2020       Basal Metabolic Profile:   Recent Labs     12/27/20  0603 12/28/20  0508    141   K 4.6 4.2   BUN 71* 56*   CREATININE 1.30* 1.17    112*   CO2 15* 22      Magnesium:   Lab Results   Component Value Date    MG 2.4 02/13/2020    MG 2.2 04/01/2017    MG 2.2 03/30/2017     Phosphorus:   Lab Results   Component Value Date    PHOS 2.3 01/19/2017    PHOS 3.4 01/18/2017    PHOS 2.0 01/18/2017     S. Calcium:  Recent Labs     12/28/20  0508   CALCIUM 7.5*     S. Ionized Calcium:No results for input(s): IONCA in the last 72 hours. Urinalysis: No results found for: NITRU, COLORU, PHUR, LABCAST, WBCUA, RBCUA, MUCUS, TRICHOMONAS, YEAST, BACTERIA, CLARITYU, SPECGRAV, LEUKOCYTESUR, UROBILINOGEN, BILIRUBINUR, BLOODU, GLUCOSEU, KETUA, AMORPHOUS    CARDIAC ENZYMES: No results for input(s): CKMB, CKMBINDEX, TROPONINI in the last 72 hours. Invalid input(s): CKTOTAL;3  BNP: No results for input(s): BNP in the last 72 hours.     LFTS  Recent Labs     12/27/20  0603 12/27/20  0617   ALKPHOS 51 51   ALT 21 21   AST 25 22   BILITOT 0.24* 0.24*   BILIDIR  --  <0.08   LABALBU 2.7* 2.9*       AMYLASE/LIPASE/AMMONIA  Recent Labs     12/27/20  0617   LIPASE 86*   AMMONIA 14*       Last 3 Blood Glucose:   Recent Labs     12/27/20  0603 12/28/20  0508   GLUCOSE 220* 87      HgBA1c:    Lab Results   Component Value Date    LABA1C 6.2 03/25/2017         TSH:    Lab Results   Component Value Date    TSH 2.44 01/20/2017     ANEMIA STUDIES  No results for input(s): LABIRON, TIBC, FERRITIN, [id number], FOLATE, OCCULTBLD in the last 72 hours. Cultures during this admission:     Blood cultures:                 [] None drawn      [] Negative             []  Positive (Details:  )  Urine Culture:                   [] None drawn      [] Negative             []  Positive (Details:  )  Sputum Culture:               [] None drawn       [] Negative             []  Positive (Details:  )   Endotracheal aspirate:     [] None drawn       [] Negative             []  Positive (Details:  )             Chest Xray (12/28/2020):    ASSESSMENT:     Active Problems:    GI bleed  Resolved Problems:    * No resolved hospital problems. *          PLAN:     1. Acute upper GI bleed leading to blood loss anemia and hemorrhagic shock. Hemoglobin is stable 7.1, underwent EGD yesterday which showed 2 bleeding ulcers which were banded. On Protonix. Octreotide DC'd. Okay for clear diet. Blood pressure on the softer side. Did not require any pressure support. Follow-up on lactic acid. 2. Chronic ischemic cardiomyopathy/systolic heart failure with ejection fraction of 35%. Fluid resuscitation with caution. Will hold Bumex for now. 3. History of CAD with stents on DAPT. Will resume dual antiplatelet therapy.     4. DARSHANA. Resolved. Continue 50 mL cc per hour as a maintenance fluid. Urine output good. George Bernal M.D.              Department of Internal Medicine/ Critical care  [facility], [facility] ([place]             12/28/2020, 8:31 AM

## 2020-12-28 NOTE — PROGRESS NOTES
Physical Therapy    Facility/Department: [facility] 4B STEPDOWN  Initial Assessment    NAME: Angella Phan  : 1945  MRN: [id number]    Date of Service: 2020    Discharge Recommendations:    Further therapy recommended at discharge. PT Equipment Recommendations  Equipment Needed: No(Pt reports owning a RW)    Assessment   Body structures, Functions, Activity limitations: Decreased functional mobility ; Decreased endurance;Decreased balance; Increased pain  Assessment: Pt ambulated 150ft w/ RW SBA, pt demonstrating no LOB noted throughout mobility. Pt would benefit from continued skilled physical therapy to address endurance and high level balance to return pt to prior level of independence. Prognosis: Good  Decision Making: Low Complexity  PT Education: Goals;PT Role;Plan of Care  REQUIRES PT FOLLOW UP: Yes  Activity Tolerance  Activity Tolerance: Patient Tolerated treatment well       Patient Diagnosis(es): The primary encounter diagnosis was Gastrointestinal hemorrhage with hematemesis. A diagnosis of Hypotension due to hypovolemia was also pertinent to this visit. has a past medical history of Arthritis, CAD (coronary artery disease), CHF (congestive heart failure) ([facility] [address] Clinical trial participant at discharge, Diabetes mellitus ([facility] [address] Former smoker, H/O ventricular tachycardia, History of blood transfusion, Hyperlipidemia, and Hypertension. has a past surgical history that includes Cardiac catheterization; Coronary angioplasty; Cardiac defibrillator placement (2016); Coronary angioplasty (2020); Cardiac defibrillator placement (2020); and Upper gastrointestinal endoscopy (N/A, 2020).     Restrictions  Restrictions/Precautions  Required Braces or Orthoses?: No  Position Activity Restriction  Other position/activity restrictions: Up w/assist  Vision/Hearing  Vision: Impaired  Vision Exceptions: Wears glasses at all times  Hearing: Within functional limits Subjective  General  Patient assessed for rehabilitation services?: Yes  Response To Previous Treatment: Not applicable  Family / Caregiver Present: Yes(Pt's wife at bedside)  Follows Commands: Within Functional Limits  Subjective  Subjective: RN and pt in agreement for PT eval; pt supine in bed upon PT arrival, pt very pleasant and cooperative throughout session  Pain Screening  Patient Currently in Pain: Yes  Pain Assessment  Pain Assessment: 0-10  Pain Level: 4  Pain Type: Chronic pain  Pain Location: Back  Pain Descriptors: Discomfort  Non-Pharmaceutical Pain Intervention(s): Ambulation/Increased Activity;Repositioned  Response to Pain Intervention: Patient Satisfied  Multiple Pain Sites: No  Vital Signs  Patient Currently in Pain: Yes       Orientation  Orientation  Overall Orientation Status: Within Functional Limits  Social/Functional History  Social/Functional History  Lives With: Spouse, Other (comment)(Spouse and granddaughter)  Type of Home: House  Home Layout: Two level, Able to Live on Main level with bedroom/bathroom  Home Access: Stairs to enter with rails  Entrance Stairs - Number of Steps: 3  Entrance Stairs - Rails: Both  Bathroom Shower/Tub: Tub/Shower unit  Bathroom Toilet: Standard  Home Equipment: Rolling walker  ADL Assistance: Independent  Homemaking Assistance: Independent  Homemaking Responsibilities: Yes(Shared with wife)  Ambulation Assistance: Independent(Pt independently ambulates with no AD at baseline, reports ambulating with RW in recent weeks due to [employer] flare up\")  Transfer Assistance: Independent  Active : Yes  Mode of Transportation: Truck  Occupation: Retired  Type of occupation: [employer]  Leisure & Hobbies: Fishing  Additional Comments: Pt reports 24hr support upon discharge.   Cognition   Cognition  Overall Cognitive Status: WFL    Objective    Joint Mobility  Spine: WFL  ROM RLE: WFL  ROM LLE: Hip and ankle WFL; Knee lacking ~15 degrees of extension  ROM RUE: WFL  ROM LUE: WFL  Strength RLE  Strength RLE: WFL  Strength LLE  Strength LLE: WFL  Strength RUE  Strength RUE: WFL  Strength LUE  Strength LUE: WFL  Motor Control  Gross Motor?: WFL  Sensation  Overall Sensation Status: WFL(pt reports chronic numbness/ tingling of bilateral feet)  Bed mobility  Supine to Sit: Minimal assistance(Assist provided for trunk progression)  Sit to Supine: Stand by assistance  Transfers  Sit to Stand: Stand by assistance  Stand to sit: Stand by assistance  Ambulation  Ambulation?: Yes  Ambulation 1  Surface: level tile  Device: Rolling Walker  Assistance: Stand by assistance  Quality of Gait: Decreased LLE due to pain; antalgic gait pattern  Gait Deviations: Slow Gaby  Distance: 150ft  Comments: No LOB noted throughout.  Pt reports increased LLE with ambulation recently due to \"gout flare up\"  Stairs/Curb  Stairs?: No     Balance  Posture: Fair  Sitting - Static: Good;-  Sitting - Dynamic: Good;-  Standing - Static: Good;-  Standing - Dynamic: Fair;+  Comments: Standing balance assessed w/ RW; pt able to sit EOB SBA        Plan   Plan  Times per week: 5x/week  Current Treatment Recommendations: Strengthening, Transfer Training, Endurance Training, Patient/Caregiver Education & Training, ROM, Balance Training, Gait Training, Home Exercise Program, Functional Mobility Training, Stair training, Safety Education & Training  Safety Devices  Type of devices: Left in bed, Call light within reach, Gait belt, Nurse notified  Restraints  Initially in place: No  AM-PAC Score     AM-PAC Inpatient Mobility without Stair Climbing Raw Score : 17 (12/28/20 1613)  AM-PAC Inpatient without Stair Climbing T-Scale Score : 48.47 (12/28/20 1613)  Mobility Inpatient CMS 0-100% Score: 32.72 (12/28/20 1613)  Mobility Inpatient without Stair CMS G-Code Modifier : Pierre Morning (12/28/20 1613)       Goals  Short term goals  Time Frame for Short term goals: 14  Short term goal 1: Pt to perform bed mobility and functional transfers with supervision  Short term goal 2: Pt to ambulate 300ft w/ least restrictive AD with supervision  Short term goal 3: Ascend/descend 3 stairs with bilateral rails SBA  Short term goal 4: Tolerate 30 minutes of therapy to demo increased endurance  Patient Goals   Patient goals :  To go home       Therapy Time   Individual Concurrent Group Co-treatment   Time In 7416         Time Out 1523         Minutes 26         Timed Code Treatment Minutes: 23 Minutes       Lucia García, PT

## 2020-12-29 LAB
ABSOLUTE EOS #: 0.25 K/UL (ref 0–0.44)
ABSOLUTE IMMATURE GRANULOCYTE: 0.4 K/UL (ref 0–0.3)
ABSOLUTE LYMPH #: 1.37 K/UL (ref 1.1–3.7)
ABSOLUTE MONO #: 0.7 K/UL (ref 0.1–1.2)
ANION GAP SERPL CALCULATED.3IONS-SCNC: 9 MMOL/L (ref 9–17)
BASOPHILS # BLD: 1 % (ref 0–2)
BASOPHILS ABSOLUTE: 0.05 K/UL (ref 0–0.2)
BUN BLDV-MCNC: 29 MG/DL (ref 8–23)
BUN/CREAT BLD: ABNORMAL (ref 9–20)
CALCIUM SERPL-MCNC: 7.9 MG/DL (ref 8.6–10.4)
CHLORIDE BLD-SCNC: 111 MMOL/L (ref 98–107)
CO2: 21 MMOL/L (ref 20–31)
CREAT SERPL-MCNC: 0.97 MG/DL (ref 0.7–1.2)
DIFFERENTIAL TYPE: ABNORMAL
EOSINOPHILS RELATIVE PERCENT: 3 % (ref 1–4)
GFR AFRICAN AMERICAN: >60 ML/MIN
GFR NON-AFRICAN AMERICAN: >60 ML/MIN
GFR SERPL CREATININE-BSD FRML MDRD: ABNORMAL ML/MIN/{1.73_M2}
GFR SERPL CREATININE-BSD FRML MDRD: ABNORMAL ML/MIN/{1.73_M2}
GLUCOSE BLD-MCNC: 100 MG/DL (ref 75–110)
GLUCOSE BLD-MCNC: 106 MG/DL (ref 75–110)
GLUCOSE BLD-MCNC: 107 MG/DL (ref 70–99)
GLUCOSE BLD-MCNC: 166 MG/DL (ref 75–110)
GLUCOSE BLD-MCNC: 184 MG/DL (ref 75–110)
HCT VFR BLD CALC: 27.4 % (ref 40.7–50.3)
HEMOGLOBIN: 8.6 G/DL (ref 13–17)
IMMATURE GRANULOCYTES: 5 %
LYMPHOCYTES # BLD: 17 % (ref 24–43)
MCH RBC QN AUTO: 30.8 PG (ref 25.2–33.5)
MCHC RBC AUTO-ENTMCNC: 31.4 G/DL (ref 28.4–34.8)
MCV RBC AUTO: 98.2 FL (ref 82.6–102.9)
MONOCYTES # BLD: 8 % (ref 3–12)
NRBC AUTOMATED: 0.8 PER 100 WBC
PDW BLD-RTO: 14.7 % (ref 11.8–14.4)
PLATELET # BLD: 244 K/UL (ref 138–453)
PLATELET ESTIMATE: ABNORMAL
PMV BLD AUTO: 9.4 FL (ref 8.1–13.5)
POTASSIUM SERPL-SCNC: 3.9 MMOL/L (ref 3.7–5.3)
RBC # BLD: 2.79 M/UL (ref 4.21–5.77)
RBC # BLD: ABNORMAL 10*6/UL
SEG NEUTROPHILS: 66 % (ref 36–65)
SEGMENTED NEUTROPHILS ABSOLUTE COUNT: 5.55 K/UL (ref 1.5–8.1)
SODIUM BLD-SCNC: 141 MMOL/L (ref 135–144)
WBC # BLD: 8.3 K/UL (ref 3.5–11.3)
WBC # BLD: ABNORMAL 10*3/UL

## 2020-12-29 PROCEDURE — 99232 SBSQ HOSP IP/OBS MODERATE 35: CPT | Performed by: INTERNAL MEDICINE

## 2020-12-29 PROCEDURE — 6370000000 HC RX 637 (ALT 250 FOR IP): Performed by: STUDENT IN AN ORGANIZED HEALTH CARE EDUCATION/TRAINING PROGRAM

## 2020-12-29 PROCEDURE — 2580000003 HC RX 258: Performed by: STUDENT IN AN ORGANIZED HEALTH CARE EDUCATION/TRAINING PROGRAM

## 2020-12-29 PROCEDURE — 36430 TRANSFUSION BLD/BLD COMPNT: CPT

## 2020-12-29 PROCEDURE — 97166 OT EVAL MOD COMPLEX 45 MIN: CPT

## 2020-12-29 PROCEDURE — 6360000002 HC RX W HCPCS: Performed by: STUDENT IN AN ORGANIZED HEALTH CARE EDUCATION/TRAINING PROGRAM

## 2020-12-29 PROCEDURE — 85025 COMPLETE CBC W/AUTO DIFF WBC: CPT

## 2020-12-29 PROCEDURE — P9016 RBC LEUKOCYTES REDUCED: HCPCS

## 2020-12-29 PROCEDURE — 86900 BLOOD TYPING SEROLOGIC ABO: CPT

## 2020-12-29 PROCEDURE — 94761 N-INVAS EAR/PLS OXIMETRY MLT: CPT

## 2020-12-29 PROCEDURE — 80048 BASIC METABOLIC PNL TOTAL CA: CPT

## 2020-12-29 PROCEDURE — 97535 SELF CARE MNGMENT TRAINING: CPT

## 2020-12-29 PROCEDURE — 2060000000 HC ICU INTERMEDIATE R&B

## 2020-12-29 PROCEDURE — 36415 COLL VENOUS BLD VENIPUNCTURE: CPT

## 2020-12-29 PROCEDURE — C9113 INJ PANTOPRAZOLE SODIUM, VIA: HCPCS | Performed by: STUDENT IN AN ORGANIZED HEALTH CARE EDUCATION/TRAINING PROGRAM

## 2020-12-29 PROCEDURE — 82947 ASSAY GLUCOSE BLOOD QUANT: CPT

## 2020-12-29 PROCEDURE — APPSS30 APP SPLIT SHARED TIME 16-30 MINUTES: Performed by: INTERNAL MEDICINE

## 2020-12-29 RX ORDER — PANTOPRAZOLE SODIUM 40 MG/1
40 TABLET, DELAYED RELEASE ORAL
Status: DISCONTINUED | OUTPATIENT
Start: 2020-12-30 | End: 2020-12-30 | Stop reason: HOSPADM

## 2020-12-29 RX ORDER — 0.9 % SODIUM CHLORIDE 0.9 %
250 INTRAVENOUS SOLUTION INTRAVENOUS ONCE
Status: DISCONTINUED | OUTPATIENT
Start: 2020-12-29 | End: 2020-12-30 | Stop reason: HOSPADM

## 2020-12-29 RX ADMIN — SODIUM CHLORIDE, PRESERVATIVE FREE 10 ML: 5 INJECTION INTRAVENOUS at 20:13

## 2020-12-29 RX ADMIN — GABAPENTIN 300 MG: 300 CAPSULE ORAL at 14:28

## 2020-12-29 RX ADMIN — PANTOPRAZOLE SODIUM 8 MG/HR: 40 INJECTION, POWDER, FOR SOLUTION INTRAVENOUS at 14:28

## 2020-12-29 RX ADMIN — INSULIN LISPRO 1 UNITS: 100 INJECTION, SOLUTION INTRAVENOUS; SUBCUTANEOUS at 20:12

## 2020-12-29 RX ADMIN — PANTOPRAZOLE SODIUM 8 MG/HR: 40 INJECTION, POWDER, FOR SOLUTION INTRAVENOUS at 02:51

## 2020-12-29 RX ADMIN — CLOPIDOGREL 75 MG: 75 TABLET, FILM COATED ORAL at 08:21

## 2020-12-29 RX ADMIN — CITALOPRAM 40 MG: 20 TABLET, FILM COATED ORAL at 08:21

## 2020-12-29 RX ADMIN — ASPIRIN 81 MG: 81 TABLET, CHEWABLE ORAL at 08:21

## 2020-12-29 RX ADMIN — INSULIN LISPRO 2 UNITS: 100 INJECTION, SOLUTION INTRAVENOUS; SUBCUTANEOUS at 12:41

## 2020-12-29 RX ADMIN — GABAPENTIN 300 MG: 300 CAPSULE ORAL at 08:21

## 2020-12-29 RX ADMIN — GABAPENTIN 300 MG: 300 CAPSULE ORAL at 20:13

## 2020-12-29 RX ADMIN — HYDROCODONE BITARTRATE AND ACETAMINOPHEN 1 TABLET: 5; 325 TABLET ORAL at 19:16

## 2020-12-29 RX ADMIN — ATORVASTATIN CALCIUM 80 MG: 80 TABLET, FILM COATED ORAL at 20:13

## 2020-12-29 RX ADMIN — FUROSEMIDE 40 MG: 10 INJECTION, SOLUTION INTRAMUSCULAR; INTRAVENOUS at 04:02

## 2020-12-29 RX ADMIN — SODIUM CHLORIDE 20 ML: 0.9 INJECTION, SOLUTION INTRAVENOUS at 01:55

## 2020-12-29 RX ADMIN — HYDROCODONE BITARTRATE AND ACETAMINOPHEN 1 TABLET: 5; 325 TABLET ORAL at 11:44

## 2020-12-29 RX ADMIN — SODIUM CHLORIDE, PRESERVATIVE FREE 10 ML: 5 INJECTION INTRAVENOUS at 08:25

## 2020-12-29 RX ADMIN — FOLIC ACID 1 MG: 1 TABLET ORAL at 08:21

## 2020-12-29 ASSESSMENT — PAIN DESCRIPTION - LOCATION: LOCATION: BACK

## 2020-12-29 ASSESSMENT — PAIN SCALES - GENERAL
PAINLEVEL_OUTOF10: 2
PAINLEVEL_OUTOF10: 4
PAINLEVEL_OUTOF10: 6
PAINLEVEL_OUTOF10: 5
PAINLEVEL_OUTOF10: 3

## 2020-12-29 NOTE — PROGRESS NOTES
INR 1.1     APTT:   Recent Labs     12/27/20 0617   APTT 20.0*     CARDIAC ENZYMES: No results for input(s): CKMB, CKMBINDEX, TROPONINI in the last 72 hours. Invalid input(s): CKTOTAL;3  FASTING LIPID PANEL:No results found for: CHOL, HDL, TRIG  LIVER PROFILE:   Recent Labs     12/27/20  0603 12/27/20  0617   AST 25 22   ALT 21 21   BILIDIR  --  <0.08   BILITOT 0.24* 0.24*   ALKPHOS 51 51      MICROBIOLOGY: No results found for: CULTURE    Imaging:    Xr Chest Portable    Result Date: 12/27/2020  Cardiomegaly. Cta Abdomen Pelvis W Wo Contrast    Result Date: 12/27/2020  1. Moderate diffuse atherosclerotic disease. No evidence for aneurysm or dissection. No active extravasation of contrast to indicate CT evidence of active bleeding. 2. No acute infective or inflammatory process. 3. No bowel obstruction. ASSESSMENT & PLAN     Principal Problem:    GI bleed  Active Problems:    H/O ventricular tachycardia    Essential hypertension    Hyperlipidemia    Type 2 diabetes mellitus without complication, without long-term current use of insulin (HCC)    CAD (coronary artery disease)    S/P angioplasty with stent    S/P ICD (internal cardiac defibrillator) procedure    Depression  Resolved Problems:    * No resolved hospital problems. *       Plan:     Acute upper GI bleed leading to blood loss anemia and hemorrhagic shock. EGD 12/27 showed 2 bleeding ulcers which were banded. Required another PRBC Tx overnight. Continue Protonix drip for now. Continue full liquid diet and advance as tolerated. Follow with further GI recommendations     Acute blood loss anemia secondary to GI bleed. Required another PRBC Tx overnight as Hb dropped to 7 again. Continue to monitor H&H with daily CBC. Transfuse to keep hemoglobin around 8     Chronic ischemic cardiomyopathy with ejection fraction of 35% and grade-II DD.   Patient is status post AICD placement.  Holding Bumex, spironolactone and entresto for now due to hypotension. Continue with gentle IV fluid hydration for now due to hypotension     Hypertension: Currently blood pressure running on lower side - Hold antihypertensives.     History of CAD with stents on DAPT.  GI okay to resume DAPT. Continue aspirin and Plavix     DARSHANA.     Resolved.  stop fluids.       Diabetes mellitus: Last A1c 5.9. Blood glucose controlled. Continue to cover with correction scale insulin.     Anxiety/depression: Continue Celexa     DVT prophylaxis: EPC cuffs for now due to GI bleed    Mart Leonard MD  Internal Medicine Resident, PGY-1  [facility]; [place], [place]  12/29/2020, 1:30 AM    Attending Physician Statement  I have discussed the care of Soo Keith and I have examined the patient myselft and taken ros and hpi , including pertinent history and exam findings,  with the resident. I have reviewed the key elements of all parts of the encounter with the resident. I agree with the assessment, plan and orders as documented by the resident.       Electronically signed by Marcy Contreras MD

## 2020-12-29 NOTE — PROGRESS NOTES
Occupational Therapy   Occupational Therapy Initial Assessment  Date: 2020   Patient Name: Sander Waddell  MRN: [id number]     : 1945    Date of Service: 2020    Discharge Recommendations:  Patient would benefit from continued therapy after discharge  OT Equipment Recommendations  Equipment Needed: No    Assessment   Performance deficits / Impairments: Decreased functional mobility ; Decreased ADL status; Decreased endurance;Decreased high-level IADLs  Assessment: Pt demonstrated functional transfers, functional mobility and ADL tasks with SBA this date and use of RW. Pt demonstrates decreased endurance. Pt is expected to benefit from skilled OT services during his acute hospitilization to maximize safety and increase independnece in ADLs/IADLs and functional mobility tasks. Pt with good family support at home to assist  upon discharge PRN. Prognosis: Good  Decision Making: Medium Complexity  Patient Education: Pt educated on OT role, OT poc, energy conservation-taking breaks, activity promotion, use of RW-good return  REQUIRES OT FOLLOW UP: Yes  Activity Tolerance  Activity Tolerance: Patient Tolerated treatment well;Patient limited by fatigue  Safety Devices  Safety Devices in place: Yes  Type of devices: Gait belt;Left in bed;Nurse notified;Call light within reach  Restraints  Initially in place: No           Patient Diagnosis(es): The primary encounter diagnosis was Gastrointestinal hemorrhage with hematemesis. A diagnosis of Hypotension due to hypovolemia was also pertinent to this visit. has a past medical history of Arthritis, CAD (coronary artery disease), CHF (congestive heart failure) ([facility] Clinical trial participant at discharge, Diabetes mellitus ([facility] Former smoker, H/O ventricular tachycardia, History of blood transfusion, Hyperlipidemia, and Hypertension.    has a past surgical history that includes Cardiac catheterization; Coronary angioplasty; Cardiac defibrillator placement (07/11/2016); Coronary angioplasty (02/12/2020); Cardiac defibrillator placement (06/01/2020); and Upper gastrointestinal endoscopy (N/A, 12/27/2020). Restrictions  Restrictions/Precautions  Restrictions/Precautions: General Precautions  Required Braces or Orthoses?: No  Position Activity Restriction  Other position/activity restrictions: Up w/assist    Subjective   General  Patient assessed for rehabilitation services?: Yes  Family / Caregiver Present: No  General Comment  Comments: RN ok'd for OT evaluation this date. Pt agreable to session, pleasent/cooperative throughout. Patient Currently in Pain: Denies  Pain Assessment  Pain Level: 2  Response to Pain Intervention: None  Vital Signs  Pulse: 78  Resp: 18  BP: (!) 92/55  MAP (mmHg): 67  Patient Currently in Pain: Denies  Oxygen Therapy  SpO2: 96 %  Pulse Oximeter Device Mode: Continuous  Pulse Oximeter Device Location: Finger  O2 Device: None (Room air)     Social/Functional History  Social/Functional History  Lives With: Spouse, Other (comment)(spouse and granddaughter)  Type of Home: House  Home Layout: Two level, Able to Live on Main level with bedroom/bathroom  Home Access: Stairs to enter with rails  Entrance Stairs - Number of Steps: 3  Entrance Stairs - Rails: Right  Bathroom Shower/Tub: Tub/Shower unit  Bathroom Toilet: Standard  Bathroom Equipment: (No equiptment)  Home Equipment: Rolling walker(Typically does not use AD, but for the past week has been using RW)  ADL Assistance: [facility]: Independent  Homemaking Responsibilities: Yes(shares with wife)  Ambulation Assistance: Independent  Transfer Assistance: Independent  Active : Yes  Mode of Transportation: Truck  Occupation: Retired  Type of occupation: [employer]  Leisure & Hobbies: Fishing  Additional Comments: Pt reports 24hr support upon discharge.        Objective   Vision: Impaired  Vision Exceptions: Wears glasses at all times  Hearing: Within functional limits    Orientation  Overall Orientation Status: Within Functional Limits     Balance  Sitting Balance: Supervision(Pt sat on toilet for toileting tasks and EOB ~10 minutes)  Standing Balance: Stand by assistance  Standing Balance  Time: ~6 minutes  Activity: functional mobility around room and to/from bathroom, standing for oral hygeine, toileting tasks, static standing EOB  Comment: RW    Functional Mobility  Functional - Mobility Device: Rolling Walker  Activity: To/from bathroom  Assist Level: Contact guard assistance  Functional Mobility Comments: Pt completed functional mobility with use of RW. Pt steady, no LOB. Required use of RW. Pt reports increased fatigue after completing ADL and mobility tasks demonstrating decreased endurance. Toilet Transfers  Toilet - Technique: Ambulating  Equipment Used: Standard toilet  Toilet Transfer: Stand by assistance    ADL  Feeding: Setup  Grooming: Stand by assistance(Pt completed oral hygeine standing at the sink with setup provided)  UE Bathing: Stand by assistance  LE Bathing: Contact guard assistance  UE Dressing: Stand by assistance  LE Dressing: Stand by assistance  Toileting: Stand by assistance(Pt completed toilet transfer, clothing management and bottom hygeine)     Tone RUE  RUE Tone: Normotonic  Tone LUE  LUE Tone: Normotonic    Coordination  Movements Are Fluid And Coordinated: Yes     Bed mobility  Supine to Sit: Stand by assistance  Sit to Supine: Stand by assistance  Scooting: Stand by assistance  Comment: HOB elevated.      Transfers  Sit to stand: Stand by assistance  Stand to sit: Stand by assistance  Transfer Comments: Use of RW     Cognition  Overall Cognitive Status: WFL        Sensation  Overall Sensation Status: WFL(Chronic numbness and tingling in bilateral feet)        LUE AROM (degrees)  LUE AROM : WFL  Left Hand AROM (degrees)  Left Hand AROM: WFL  RUE AROM (degrees)  RUE AROM : WFL  Right Hand AROM (degrees)  Right Hand AROM: WFL  LUE Strength  Gross LUE Strength: WFL  L Hand General: 4+/5  LUE Strength Comment: Grossly 4+/5  RUE Strength  Gross RUE Strength: WFL  R Hand General: 4+/5  RUE Strength Comment: Grossly 4+/5                   Plan   Plan  Times per week: 2-3x/week  Current Treatment Recommendations: Patient/Caregiver Education & Training, Functional Mobility Training, Equipment Evaluation, Education, & procurement, Endurance Training, Self-Care / ADL, Safety Education & Training           AM-PAC Score        AM-PAC Inpatient Daily Activity Raw Score: 19 (12/29/20 1350)  AM-PAC Inpatient ADL T-Scale Score : 40.22 (12/29/20 1350)  ADL Inpatient CMS 0-100% Score: 42.8 (12/29/20 1350)  ADL Inpatient CMS G-Code Modifier : CK (12/29/20 1350)    Goals  Short term goals  Time Frame for Short term goals: By discharge, pt will:  Short term goal 1: Complete functional mobility independently  Short term goal 2: complete ADLs with Mod I, use  of DME PRN  Short term goal 3: Demonstrate +20 minutes of standing tolerance for increased endurance to increase engagement in ADLs/IADLs  Short term goal 4: Demonstrate use of  energy conservation techniques throughout all ADLs/IADLs PRN       Therapy Time   Individual Concurrent Group Co-treatment   Time In 0825         Time Out 0847         Minutes 22         Timed Code Treatment Minutes: 18 Minutes       Marty Oliver OTR/L

## 2020-12-30 VITALS
BODY MASS INDEX: 31.94 KG/M2 | DIASTOLIC BLOOD PRESSURE: 69 MMHG | HEART RATE: 74 BPM | WEIGHT: 210.76 LBS | OXYGEN SATURATION: 98 % | HEIGHT: 68 IN | SYSTOLIC BLOOD PRESSURE: 111 MMHG | RESPIRATION RATE: 20 BRPM | TEMPERATURE: 98.2 F

## 2020-12-30 PROBLEM — E83.51 HYPOCALCEMIA: Status: RESOLVED | Noted: 2017-01-18 | Resolved: 2020-12-30

## 2020-12-30 PROBLEM — R55 SYNCOPE: Status: RESOLVED | Noted: 2017-01-18 | Resolved: 2020-12-30

## 2020-12-30 LAB
ABO/RH: NORMAL
ABSOLUTE EOS #: 0.26 K/UL (ref 0–0.44)
ABSOLUTE IMMATURE GRANULOCYTE: 0.23 K/UL (ref 0–0.3)
ABSOLUTE LYMPH #: 1.56 K/UL (ref 1.1–3.7)
ABSOLUTE MONO #: 0.67 K/UL (ref 0.1–1.2)
ANION GAP SERPL CALCULATED.3IONS-SCNC: 10 MMOL/L (ref 9–17)
ANTIBODY SCREEN: NEGATIVE
ARM BAND NUMBER: NORMAL
BASOPHILS # BLD: 1 % (ref 0–2)
BASOPHILS ABSOLUTE: 0.04 K/UL (ref 0–0.2)
BLD PROD TYP BPU: NORMAL
BUN BLDV-MCNC: 26 MG/DL (ref 8–23)
BUN/CREAT BLD: ABNORMAL (ref 9–20)
CALCIUM SERPL-MCNC: 8 MG/DL (ref 8.6–10.4)
CHLORIDE BLD-SCNC: 110 MMOL/L (ref 98–107)
CO2: 20 MMOL/L (ref 20–31)
CREAT SERPL-MCNC: 1.05 MG/DL (ref 0.7–1.2)
CROSSMATCH RESULT: NORMAL
DIFFERENTIAL TYPE: ABNORMAL
DISPENSE STATUS BLOOD BANK: NORMAL
EOSINOPHILS RELATIVE PERCENT: 4 % (ref 1–4)
EXPIRATION DATE: NORMAL
GFR AFRICAN AMERICAN: >60 ML/MIN
GFR NON-AFRICAN AMERICAN: >60 ML/MIN
GFR SERPL CREATININE-BSD FRML MDRD: ABNORMAL ML/MIN/{1.73_M2}
GFR SERPL CREATININE-BSD FRML MDRD: ABNORMAL ML/MIN/{1.73_M2}
GLUCOSE BLD-MCNC: 103 MG/DL (ref 75–110)
GLUCOSE BLD-MCNC: 87 MG/DL (ref 70–99)
GLUCOSE BLD-MCNC: 87 MG/DL (ref 75–110)
HCT VFR BLD CALC: 27 % (ref 40.7–50.3)
HEMOGLOBIN: 8.5 G/DL (ref 13–17)
IMMATURE GRANULOCYTES: 3 %
LYMPHOCYTES # BLD: 21 % (ref 24–43)
MCH RBC QN AUTO: 31.5 PG (ref 25.2–33.5)
MCHC RBC AUTO-ENTMCNC: 31.5 G/DL (ref 28.4–34.8)
MCV RBC AUTO: 100 FL (ref 82.6–102.9)
MONOCYTES # BLD: 9 % (ref 3–12)
NRBC AUTOMATED: 0.3 PER 100 WBC
PDW BLD-RTO: 15.4 % (ref 11.8–14.4)
PLATELET # BLD: 235 K/UL (ref 138–453)
PLATELET ESTIMATE: ABNORMAL
PMV BLD AUTO: 9.2 FL (ref 8.1–13.5)
POTASSIUM SERPL-SCNC: 3.6 MMOL/L (ref 3.7–5.3)
RBC # BLD: 2.7 M/UL (ref 4.21–5.77)
RBC # BLD: ABNORMAL 10*6/UL
SEG NEUTROPHILS: 62 % (ref 36–65)
SEGMENTED NEUTROPHILS ABSOLUTE COUNT: 4.73 K/UL (ref 1.5–8.1)
SODIUM BLD-SCNC: 140 MMOL/L (ref 135–144)
TRANSFUSION STATUS: NORMAL
UNIT DIVISION: 0
UNIT NUMBER: NORMAL
WBC # BLD: 7.5 K/UL (ref 3.5–11.3)
WBC # BLD: ABNORMAL 10*3/UL

## 2020-12-30 PROCEDURE — 6370000000 HC RX 637 (ALT 250 FOR IP): Performed by: STUDENT IN AN ORGANIZED HEALTH CARE EDUCATION/TRAINING PROGRAM

## 2020-12-30 PROCEDURE — 6370000000 HC RX 637 (ALT 250 FOR IP): Performed by: INTERNAL MEDICINE

## 2020-12-30 PROCEDURE — 80048 BASIC METABOLIC PNL TOTAL CA: CPT

## 2020-12-30 PROCEDURE — 85025 COMPLETE CBC W/AUTO DIFF WBC: CPT

## 2020-12-30 PROCEDURE — 36415 COLL VENOUS BLD VENIPUNCTURE: CPT

## 2020-12-30 PROCEDURE — 99239 HOSP IP/OBS DSCHRG MGMT >30: CPT | Performed by: INTERNAL MEDICINE

## 2020-12-30 PROCEDURE — 82947 ASSAY GLUCOSE BLOOD QUANT: CPT

## 2020-12-30 PROCEDURE — 2580000003 HC RX 258: Performed by: STUDENT IN AN ORGANIZED HEALTH CARE EDUCATION/TRAINING PROGRAM

## 2020-12-30 RX ORDER — BUMETANIDE 2 MG/1
1 TABLET ORAL 2 TIMES DAILY
Qty: 30 TABLET | Refills: 0 | Status: SHIPPED | OUTPATIENT
Start: 2020-12-30

## 2020-12-30 RX ORDER — PANTOPRAZOLE SODIUM 40 MG/1
40 TABLET, DELAYED RELEASE ORAL
Qty: 120 TABLET | Refills: 0 | Status: SHIPPED | OUTPATIENT
Start: 2020-12-30 | End: 2021-02-28

## 2020-12-30 RX ORDER — FERROUS SULFATE 325(65) MG
325 TABLET ORAL
Qty: 30 TABLET | Refills: 0 | Status: SHIPPED | OUTPATIENT
Start: 2020-12-30

## 2020-12-30 RX ORDER — POTASSIUM CHLORIDE 20 MEQ/1
40 TABLET, EXTENDED RELEASE ORAL ONCE
Status: COMPLETED | OUTPATIENT
Start: 2020-12-30 | End: 2020-12-30

## 2020-12-30 RX ORDER — PANTOPRAZOLE SODIUM 40 MG/1
40 TABLET, DELAYED RELEASE ORAL
Qty: 90 TABLET | Refills: 0 | Status: SHIPPED | OUTPATIENT
Start: 2020-12-30

## 2020-12-30 RX ORDER — POTASSIUM CHLORIDE 20 MEQ/1
20 TABLET, EXTENDED RELEASE ORAL DAILY
Qty: 30 TABLET | Refills: 0 | Status: SHIPPED | OUTPATIENT
Start: 2020-12-30

## 2020-12-30 RX ADMIN — CITALOPRAM 40 MG: 20 TABLET, FILM COATED ORAL at 09:25

## 2020-12-30 RX ADMIN — HYDROCODONE BITARTRATE AND ACETAMINOPHEN 1 TABLET: 5; 325 TABLET ORAL at 10:04

## 2020-12-30 RX ADMIN — POTASSIUM CHLORIDE 40 MEQ: 1500 TABLET, EXTENDED RELEASE ORAL at 10:05

## 2020-12-30 RX ADMIN — CLOPIDOGREL 75 MG: 75 TABLET, FILM COATED ORAL at 09:25

## 2020-12-30 RX ADMIN — FOLIC ACID 1 MG: 1 TABLET ORAL at 09:25

## 2020-12-30 RX ADMIN — PANTOPRAZOLE SODIUM 40 MG: 40 TABLET, DELAYED RELEASE ORAL at 06:22

## 2020-12-30 RX ADMIN — SODIUM CHLORIDE, PRESERVATIVE FREE 10 ML: 5 INJECTION INTRAVENOUS at 10:22

## 2020-12-30 RX ADMIN — GABAPENTIN 300 MG: 300 CAPSULE ORAL at 09:25

## 2020-12-30 RX ADMIN — ASPIRIN 81 MG: 81 TABLET, CHEWABLE ORAL at 09:25

## 2020-12-30 ASSESSMENT — PAIN SCALES - GENERAL: PAINLEVEL_OUTOF10: 8

## 2020-12-30 NOTE — PROGRESS NOTES
[facility]  Internal Medicine Teaching Residency Program  Inpatient Daily Progress Note  ______________________________________________________________________________    Patient: Charline Montes De Oca  YOB: 1945   QCX:[id number]    Acct: [de-identified]     Room: /0[facility]  Admit date: 12/27/2020  Today's date: 12/30/20  Number of days in the hospital: 3    SUBJECTIVE   Admitting Diagnosis: GI bleed  CC: hemetemesis , melena  Pt examined at bedside. Chart & results reviewed. No acute events overnight. Tolerating diet well. No bowel movement since yesterday morning. Hemoglobin stable around 8.6. Denies any active complaint. Maintaining saturation on room air. Hemodynamically stable. ROS:  Constitutional:  negative for chills, fevers, sweats  Respiratory:  negative for cough, dyspnea on exertion, hemoptysis, shortness of breath, wheezing  Cardiovascular:  negative for chest pain, chest pressure/discomfort, lower extremity edema, palpitations  Gastrointestinal:  negative for abdominal pain, constipation, diarrhea, nausea, vomiting  Neurological:  negative for dizziness, headache  BRIEF HISTORY     ICU COURSE :-      Ke Gong a 75 y. o. with past medical history of CAD status post stent on aspirin and Plavix, ischemic cardiomyopathy and history of V. tach s/p AICD, diabetes mellitus and hypertension who presented to ED with reports of melena and hematemesis.     As per patient he had episode of hematemesis on the morning of presentation with melena last evening.  Patient has hematemesis of both dark red and bright red blood.  Patient never had EGD and colonoscopy before patient never had an episode of bloody bowel movement or vomiting ever before according to ED, EMS estimated blood loss was around 1 L with some clots.  Patient denied any NSAID use other than low-dose aspirin, denies any significant alcohol intake.     On arrival patient was hypotensive and received total 2 L of fluid bolus.  He was a started on PPI drip along with octreotide.  Hemoglobin around a week ago was 12 and during this admission hemoglobin was 7.5.  Patient received total of 2 units of PRBC as per GI.     Patient denies any epigastric pain, chest pain, nausea, dizziness, leg pain, leg swelling, shortness of breath but endorses feeling weak and tired.     Of note patient was diagnosed with Covid and 2020.  Patient was not treated for that.     CTA abdomen was done which was negative for acute GI bleed.  GI consulted from ER.     Underwent EGD that showed 2 ulcers in the esophagus with visible bleeding from vessels which were banded with 2 bands.  Small hiatal hernia. Patient was started on clear liquid diet with transition to full liquid diet and he tolerated it well. Aspirin and Plavix were resumed.  Octreotide drip was stopped.  Denies any nausea, vomiting, diarrhea, abdominal pain.  Ok for liquid diet, monitor Hgb. OBJECTIVE     Vital Signs:  /66   Pulse 74   Temp 98 °F (36.7 °C) (Oral)   Resp 20   Ht 5' 8\" (1.727 m)   Wt 210 lb 12.2 oz (95.6 kg)   SpO2 98%   BMI 32.05 kg/m²     Temp (24hrs), Av °F (36.7 °C), Min:97.2 °F (36.2 °C), Max:98.6 °F (37 °C)    In: 10   Out: 500 [Urine:500]    General appearance: alert and cooperative with exam  HEENT: Head: Normocephalic, no lesions, without obvious abnormality.   Neck: no adenopathy, no carotid bruit, no JVD, supple, symmetrical, trachea midline and thyroid not enlarged, symmetric, no tenderness/mass/nodules  Lungs: clear to auscultation bilaterally  Heart: regular rate and rhythm, S1, S2 normal, no murmur, click, rub or gallop  Abdomen: soft, non-tender; bowel sounds normal; no masses,  no organomegaly  Extremities: extremities normal, atraumatic, no cyanosis or edema  Neurologic: Mental status: Alert, oriented, thought content appropriate      Medications:  Scheduled Medications:    potassium chloride  40 mEq Oral Once    sodium chloride  250 mL Intravenous Once    pantoprazole  40 mg Oral BID AC    clopidogrel  75 mg Oral Daily    citalopram  40 mg Oral Daily    atorvastatin  80 mg Oral Nightly    aspirin  81 mg Oral Daily    folic acid  1 mg Oral Daily    gabapentin  300 mg Oral TID    insulin lispro  0-12 Units Subcutaneous TID WC    insulin lispro  0-6 Units Subcutaneous Nightly    sodium chloride  250 mL Intravenous Once    sodium chloride flush  10 mL Intravenous 2 times per day     Continuous Infusions:    dextrose       PRN Medications    glucose, 15 g, PRN      dextrose, 12.5 g, PRN      glucagon (rDNA), 1 mg, PRN      dextrose, 100 mL/hr, PRN      sodium chloride flush, 10 mL, PRN      promethazine, 12.5 mg, Q6H PRN    Or      ondansetron, 4 mg, Q6H PRN      polyethylene glycol, 17 g, Daily PRN      acetaminophen, 650 mg, Q6H PRN    Or      acetaminophen, 650 mg, Q6H PRN      potassium chloride, 40 mEq, PRN    Or      potassium alternative oral replacement, 40 mEq, PRN    Or      potassium chloride, 10 mEq, PRN      magnesium sulfate, 2 g, PRN      potassium chloride, 10 mEq, PRN      HYDROcodone-acetaminophen, 1 tablet, Q6H PRN        Diagnostic Labs:  CBC:   Recent Labs     12/28/20  0508 12/28/20  0508 12/28/20  2155 12/29/20  0641 12/30/20  0703   WBC 10.4  --   --  8.3 7.5   RBC 2.31*  --   --  2.79* 2.70*   HGB 7.2*   < > 7.0* 8.6* 8.5*   HCT 22.5*   < > 22.5* 27.4* 27.0*   MCV 97.4  --   --  98.2 100.0   RDW 15.4*  --   --  14.7* 15.4*     --   --  244 235    < > = values in this interval not displayed. BMP:   Recent Labs     12/28/20  0508 12/29/20  0641 12/30/20  0703    141 140   K 4.2 3.9 3.6*   * 111* 110*   CO2 22 21 20   BUN 56* 29* 26*   CREATININE 1.17 0.97 1.05     BNP: No results for input(s): BNP in the last 72 hours. PT/INR:   No results for input(s): PROTIME, INR in the last 72 hours. APTT:   No results for input(s):  APTT in the last 72 hours.  CARDIAC ENZYMES: No results for input(s): CKMB, CKMBINDEX, TROPONINI in the last 72 hours. Invalid input(s): CKTOTAL;3  FASTING LIPID PANEL:No results found for: CHOL, HDL, TRIG  LIVER PROFILE:   No results for input(s): AST, ALT, ALB, BILIDIR, BILITOT, ALKPHOS in the last 72 hours. MICROBIOLOGY: No results found for: CULTURE    Imaging:    Xr Chest Portable    Result Date: 12/27/2020  Cardiomegaly. Cta Abdomen Pelvis W Wo Contrast    Result Date: 12/27/2020  1. Moderate diffuse atherosclerotic disease. No evidence for aneurysm or dissection. No active extravasation of contrast to indicate CT evidence of active bleeding. 2. No acute infective or inflammatory process. 3. No bowel obstruction. ASSESSMENT & PLAN     Principal Problem:    GI bleed  Active Problems:    H/O ventricular tachycardia    Essential hypertension    Hyperlipidemia    Type 2 diabetes mellitus without complication, without long-term current use of insulin (HCC)    CAD (coronary artery disease)    S/P angioplasty with stent    S/P ICD (internal cardiac defibrillator) procedure    Depression  Resolved Problems:    * No resolved hospital problems. *       Plan:     Acute upper GI bleed leading to blood loss anemia and hemorrhagic shock. Improved. EGD 12/27 showed 2 bleeding ulcers which were banded. Hemoglobin stable. Protonix switched to p.o. 40 twice daily. Advance diet as tolerating. GI signed off.     Acute blood loss anemia secondary to GI bleed. Hemoglobin stable around 8.6. Continue to monitor H&H with daily CBC. Transfuse to keep hemoglobin around 8     Chronic ischemic cardiomyopathy with ejection fraction of 35% and grade-II DD. Patient is status post AICD placement.  Holding Bumex, spironolactone and entresto for now due to hypotension.  Continue with gentle IV fluid hydration for now due to hypotension     Hypertension: Currently blood pressure running on lower side - Hold antihypertensives.     History of CAD with stents on DAPT.  GI okay to resume DAPT. Continue aspirin and Plavix     DARSHANA.     Resolved.  stop fluids.       Diabetes mellitus: Last A1c 5.9. Blood glucose controlled. Continue to cover with correction scale insulin.     Anxiety/depression: Continue Celexa     DVT prophylaxis: EPC cuffs for now due to GI bleed  Discharge planning: Discharge home today    Anthony Lemus MD  Internal Medicine Resident, PGY-1  [facility];  [place], [place]  12/30/2020, 9:56 AM

## 2020-12-30 NOTE — DISCHARGE SUMMARY
[address]     Department of Internal Medicine - Staff Internal Medicine Teaching Service    INPATIENT DISCHARGE SUMMARY      Patient Identification:  Jayda Conteh is a 75 y.o. male. :  1945  MRN: [id number]     Acct: [de-identified]   PCP: Ann Cordero MD  Admit Date:  2020  Discharge date and time: No discharge date for patient encounter. Attending Provider: Thi Saravia, [address] [facility] Problem Lists:  Principal Problem:    GI bleed  Active Problems:    H/O ventricular tachycardia    Essential hypertension    Hyperlipidemia    Type 2 diabetes mellitus without complication, without long-term current use of insulin (HCC)    CAD (coronary artery disease)    S/P angioplasty with stent    S/P ICD (internal cardiac defibrillator) procedure    Depression  Resolved Problems:    * No resolved hospital problems. *      HOSPITAL STAY     Brief Inpatient course: Jayda Conteh is a 75 y.o. male with past medical history of CAD s/p PCI on DAPT, ischemic cardiomyopathy and history of V. tach s/p AICD, diabetes mellitus and hypertension who was admitted for the management of GI bleed, presented to the emergency department with reports of melena and hematemesis. Patient was started on PPI drip along with octreotide. Patient had EGD that showed 2 bleeding ulcers at GE junction which were banded. Patient was continued on Protonix drip, octreotide was discontinued. Protonix drip was later changed to p.o. Protonix. Patient was started on clear liquid diet with gradual transition to low fiber diet. He tolerated diet very well and his hemoglobin remained stable. His aspirin and Plavix were resumed after clearance from the gastroenterology. Patient was discharged home in stable condition    significant therapeutic interventions done at the hospital:   1.  Acute upper GI bleed leading to blood loss anemia and hemorrhagic shock.   Improved. EGD 12/27 showed 2 bleeding ulcers which were banded. Hemoglobin stable. Protonix switched to p.o. 40 twice daily. Advance diet as tolerating. GI signed off.  2. Acute blood loss anemia secondary to GI bleed. Hemoglobin stable around 8.6. Continue to monitor H&H with daily CBC. Transfuse to keep hemoglobin around 8  3. Chronic ischemic cardiomyopathy with ejection fraction of 35% and grade-II DD.  Patient is status post AICD placement.  Holding Bumex, spironolactone and entresto for now due to hypotension. Continue with gentle IV fluid hydration for now due to hypotension  4. Hypertension: Currently blood pressure running on lower side - Hold antihypertensives.   5. History of CAD with stents on DAPT.  GI okay to resume DAPT. Continue aspirin and Plavix   6. DARSHANA.     Resolved.  stop fluids.    7. Diabetes mellitus: Last A1c 5.9.  Blood glucose controlled.  Continue to cover with correction scale insulin. 8. Anxiety/depression: Continue Celexa    Procedures/ Significant Interventions:    EGD    Consults:     Consults:     Final Specialist Recommendations/Findings:   IP CONSULT TO GI  IP CONSULT TO CRITICAL CARE  IP CONSULT TO CASE MANAGEMENT      Any Hospital Acquired Infections: none    Discharge Functional Status:  stable    DISCHARGE PLAN     Disposition: home    Patient Instructions:   Current Discharge Medication List      START taking these medications    Details   ! ! pantoprazole (PROTONIX) 40 MG tablet Take 1 tablet by mouth 2 times daily (before meals)  Qty: 120 tablet, Refills: 0    Comments: Protonix 40 BID for 2 months followed by protonix 40 daily indefinately      !!  pantoprazole (PROTONIX) 40 MG tablet Take 1 tablet by mouth every morning (before breakfast)  Qty: 90 tablet, Refills: 0    Comments: start on 03/01/2021   protonix 40 daily indefinitely      ferrous sulfate (IRON 325) 325 (65 Fe) MG tablet Take 1 tablet by mouth daily (with breakfast)  Qty: 30 tablet, Refills: 0       !! - Potential duplicate medications found. Please discuss with provider. CONTINUE these medications which have CHANGED    Details   potassium chloride (KLOR-CON M) 20 MEQ extended release tablet Take 1 tablet by mouth daily  Qty: 30 tablet, Refills: 0      bumetanide (BUMEX) 2 MG tablet Take 0.5 tablets by mouth 2 times daily  Qty: 30 tablet, Refills: 0         CONTINUE these medications which have NOT CHANGED    Details   spironolactone (ALDACTONE) 25 MG tablet Take 25 mg by mouth daily Take 1/2 (one-half) tablet by mouth daily      escitalopram (LEXAPRO) 20 MG tablet Take 20 mg by mouth daily Take one tablet by mouth daily      gabapentin (NEURONTIN) 600 MG tablet Take 600 mg by mouth 3 times daily. Take 2 tablets by mouth three times daily      sacubitril-valsartan (ENTRESTO)  MG per tablet Take 1 tablet by mouth 2 times daily       zolpidem (AMBIEN) 10 MG tablet Take by mouth nightly as needed for Sleep.      nitroGLYCERIN (NITROSTAT) 0.4 MG SL tablet Place 1 tablet under the tongue every 5 minutes as needed for Chest pain up to max of 3 total doses. If no relief after 1 dose, call 911. Qty: 25 tablet, Refills: 3      atorvastatin (LIPITOR) 80 MG tablet Take 1 tablet by mouth nightly  Qty: 30 tablet, Refills: 3      Omega-3 Fatty Acids (FISH OIL PO) Take 1 tablet by mouth 2 times daily      aspirin 81 MG chewable tablet Take 81 mg by mouth daily      folic acid (FOLVITE) 1 MG tablet Take 1 mg by mouth daily      clopidogrel (PLAVIX) 75 MG tablet Take 75 mg by mouth daily      HYDROcodone-acetaminophen (NORCO) 7.5-325 MG per tablet Take 1 tablet by mouth every 6 hours as needed for Pain.           STOP taking these medications       metOLazone (ZAROXOLYN) 2.5 MG tablet Comments:   Reason for Stopping:         predniSONE (DELTASONE) 20 MG tablet Comments:   Reason for Stopping:         citalopram (CELEXA) 40 MG tablet Comments:   Reason for Stopping:         glimepiride (AMARYL) 1 MG tablet Comments:   Reason for Stopping:         metFORMIN (GLUCOPHAGE) 1000 MG tablet Comments:   Reason for Stopping:               Activity: activity as tolerated    Diet: cardiac diet    Follow-up:    [facility]  [address]  [address]  [address] [facility] [address]  [phone]  Call in 2 weeks  Hospital Follow up for GI bleed    Woo Martell MD  [address] [address]  [phone]    In 2 weeks  decreased bumex to 1 mg BID, d/c metolazone. d/c amaryl and metformin    [facility]  [address] [place] [address] [phone]  In 2 weeks  s/p multiple PO- on asa/plavix for 1 yr. ICM s/p AICD in place      Patient Instructions: Follow-up with PCP  Follow up labs: None  Follow up imaging: None    Note that over 30 minutes was spent in preparing discharge papers, discussing discharge with patient, medication review, etc.      Ibeth Soto MD, MD  Internal Medicine Resident, PGY-1  [facility]; [place], [place]  12/30/2020, 2:41 PM    Attending Physician Statement  I have discussed the care of Ignacio Greenberg and I have examined the patient myselft and taken ros and hpi , including pertinent history and exam findings,  with the resident. I have reviewed the key elements of all parts of the encounter with the resident. I agree with the assessment, plan and orders as documented by the resident.       Electronically signed by Nate Peterson MD

## 2020-12-30 NOTE — DISCHARGE INSTR - COC
Continuity of Care Form    Patient Name: Shyla Santana   :  1945  MRN:  [id number]    [facility] date:  2020  Discharge date:  ***    Code Status Order: Full Code   Advance Directives:   Advance Care Flowsheet Documentation       Date/Time Healthcare Directive Type of Healthcare Directive Copy in [address] Agent's Name Healthcare Agent's Phone Number    [phone]  Yes, patient has an advance directive for healthcare treatment  Living will;Durable power of  for health care  No, copy requested from family  Spouse  --  --            Admitting Physician:  Tito Monaco MD  PCP: Caroline Peterson MD    Discharging Nurse: [facility] Unit/Room#: 4326/9767-68  Discharging Unit Phone Number: ***    Emergency Contact:   Extended Emergency Contact Information  Primary Emergency Contact: Eve Griffith  Address: [address] [place] [address]  Home Phone: [phone]  Mobile Phone: [phone]  Relation: Spouse    Past Surgical History:  Past Surgical History:   Procedure Laterality Date    CARDIAC CATHETERIZATION      multiple, last 4-2016    [address]  2016    CARDIAC DEFIBRILLATOR PLACEMENT  2020    Up grade to Bi-V    CORONARY ANGIOPLASTY      multiple last date 4-    CORONARY ANGIOPLASTY  2020    UPPER GASTROINTESTINAL ENDOSCOPY N/A 2020    EGD BAND LIGATION OF ESOPHAGEAL ULCERS AT GE JUNCTION performed by Lorne Cortes MD at [facility]       Immunization History:   Immunization History   Administered Date(s) Administered    Influenza Vaccine, unspecified formulation 10/31/2016    Pneumococcal Conjugate 13-valent Philippe Terri) 10/31/2016       Active Problems:  Patient Active Problem List   Diagnosis Code    AICD discharge Z45.02    Essential hypertension I10    Hyperlipidemia E78.5    Type 2 diabetes mellitus without complication, without long-term current use of insulin ([facility] [address] E11.9 CAD (coronary artery disease) I25.10    Hypophosphatemia E83.39    V tach (HCC) I47.2    H/O ventricular tachycardia Z86.79    S/P angioplasty with stent Z95.820    S/P ICD (internal cardiac defibrillator) procedure Z95.810    GI bleed K92.2    Depression F32.9       Isolation/Infection:   Isolation            No Isolation          Unreconciled Outside Infections       Enable clinical decision support by reconciling outside information with the patient's chart. .      Infection Onset Last Indicated Last Received Source    No mapped external infections found      . Unmapped Infections        COVID-19 Positive 12/23/20 12/23/20 12/27/20 [facility]                  Patient Infection Status       Infection Onset Added Last Indicated Last Indicated By Review Planned Expiration Resolved Resolved By    None active    Resolved    COVID-19 Rule Out 05/27/20 05/27/20 05/28/20 COVID-19 (Ordered)   05/29/20 Rule-Out Test Resulted            Nurse Assessment:  Last Vital Signs: /66   Pulse 74   Temp 98 °F (36.7 °C) (Oral)   Resp 20   Ht 5' 8\" (1.727 m)   Wt 210 lb 12.2 oz (95.6 kg)   SpO2 98%   BMI 32.05 kg/m²     Last documented pain score (0-10 scale): Pain Level: 8  Last Weight:   Wt Readings from Last 1 Encounters:   12/30/20 210 lb 12.2 oz (95.6 kg)     Mental Status:  {IP PT MENTAL STATUS:20030:::0}    IV Access:  { NEO IV ACCESS:855980908:::0}    Nursing Mobility/ADLs:  Walking   {CHP DME ADLs:376934037:::0}  Transfer  {CHP DME ADLs:350624627:::0}  Bathing  {CHP DME ADLs:447143432:::0}  Dressing  {CHP DME ADLs:226964371:::0}  Toileting  {CHP DME ADLs:879344707:::0}  Feeding  {CHP DME ADLs:836408952:::0}  Med Admin  {CHP DME ADLs:592183491:::0}  Med Delivery   { NEO MED Delivery:261619337:::0}    Wound Care Documentation and Therapy:        Elimination:  Continence:    Bowel: {YES / QO:32480}  Bladder: {YES / CV:07240}  Urinary Catheter: {Urinary Catheter:657360242:::0} Colostomy/Ileostomy/Ileal Conduit: {YES / GH:50487}       Date of Last BM: ***    Intake/Output Summary (Last 24 hours) at 2020 1019  Last data filed at 2020 0600  Gross per 24 hour   Intake 10 ml   Output 500 ml   Net -490 ml     I/O last 3 completed shifts:   In: 10 [I.V.:10]  Out: 1500 [Urine:1500]    Safety Concerns:     [address] NEO Safety Concerns:365828891:::0}    Impairments/Disabilities:      [address] NEO Impairments/Disabilities:937933586:::0}    Nutrition Therapy:  Current Nutrition Therapy:   [address] [facility] Diet List:446161298:::0}    Routes of Feeding: {CHP DME Other Feedings:841455668:::0}  Liquids: {Slp liquid thickness:20780}  Daily Fluid Restriction: {CHP DME Yes amt example:535164059:::0}  Last Modified Barium Swallow with Video (Video Swallowing Test): {Done Not Done TPMP:685050020:::4}    Treatments at the Time of Hospital Discharge:   Respiratory Treatments: ***  Oxygen Therapy:  {Therapy; copd oxygen:02585:::0}  Ventilator:    {[facility] Vent List:351979314:::0}    Rehab Therapies: {THERAPEUTIC INTERVENTION:8734374492}  Weight Bearing Status/Restrictions: [facility] [address]  Weight Bearin:::0}  Other Medical Equipment (for information only, NOT a DME order):  {EQUIPMENT:182932146}  Other Treatments: ***    Patient's personal belongings (please select all that are sent with patient):  {CHP DME Belongings:466741233:::0}    RN SIGNATURE:  {Esignature:010152760:::0}    CASE MANAGEMENT/SOCIAL WORK SECTION    Inpatient Status Date: ***    Readmission Risk Assessment Score:  Readmission Risk              Risk of Unplanned Readmission:        20           Discharging to Facility/ Agency   Name:   Address:  Phone:  Fax:    Dialysis Facility (if applicable)   Name:  Address:  Dialysis Schedule:  Phone:  Fax:    / signature: {Esignature:135081372:::0}    PHYSICIAN SECTION    Prognosis: {Prognosis:8803314363:::0}    Condition at Discharge: [address] Patient Condition:413691530:::0}    Rehab Potential (if transferring to Rehab): {Prognosis:2047360013:::0}    Recommended Labs or Other Treatments After Discharge: ***    Physician Certification: I certify the above information and transfer of Coleen Pa  is necessary for the continuing treatment of the diagnosis listed and that he requires {Admit to Appropriate Level of Care:54596:::0} for {GREATER/LESS:729762982} 30 days.      Update Admission H&P: {CHP DME Changes in HandP:443480903:::0}    PHYSICIAN SIGNATURE:  Electronically signed by Katarzyna Singletary MD on 12/30/20 at 3:46 PM EST

## 2020-12-30 NOTE — PROGRESS NOTES
CLINICAL PHARMACY NOTE: [address] Select Patient?: No  Total # of Prescriptions Filled: 4   The following medications were delivered to the patient:  · Potassium  · protonix  · bumex  Total # of Interventions Completed: 0  Time Spent (min): 0    Additional Documentation:

## 2021-01-04 LAB
EKG ATRIAL RATE: 87 BPM
EKG P AXIS: 37 DEGREES
EKG P-R INTERVAL: 118 MS
EKG Q-T INTERVAL: 424 MS
EKG QRS DURATION: 144 MS
EKG QTC CALCULATION (BAZETT): 510 MS
EKG R AXIS: -120 DEGREES
EKG T AXIS: 61 DEGREES
EKG VENTRICULAR RATE: 87 BPM

## 2021-12-06 ENCOUNTER — TELEPHONE (OUTPATIENT)
Dept: GASTROENTEROLOGY | Age: 76
End: 2021-12-06

## 2021-12-06 NOTE — TELEPHONE ENCOUNTER
LVM for the patient that 12/22/21 was cancelled due to provider conflict and moved to 39/18/32 2:30 pm at [facility]. Sharing message with the patient.

## 2021-12-14 ENCOUNTER — OFFICE VISIT (OUTPATIENT)
Dept: GASTROENTEROLOGY | Age: 76
End: 2021-12-14
Payer: MEDICARE

## 2021-12-14 VITALS
BODY MASS INDEX: 32.54 KG/M2 | DIASTOLIC BLOOD PRESSURE: 60 MMHG | WEIGHT: 214 LBS | TEMPERATURE: 97.2 F | SYSTOLIC BLOOD PRESSURE: 100 MMHG | HEART RATE: 75 BPM

## 2021-12-14 DIAGNOSIS — R19.7 DIARRHEA, UNSPECIFIED TYPE: Primary | ICD-10-CM

## 2021-12-14 PROCEDURE — 4040F PNEUMOC VAC/ADMIN/RCVD: CPT | Performed by: INTERNAL MEDICINE

## 2021-12-14 PROCEDURE — G8417 CALC BMI ABV UP PARAM F/U: HCPCS | Performed by: INTERNAL MEDICINE

## 2021-12-14 PROCEDURE — G8427 DOCREV CUR MEDS BY ELIG CLIN: HCPCS | Performed by: INTERNAL MEDICINE

## 2021-12-14 PROCEDURE — 1036F TOBACCO NON-USER: CPT | Performed by: INTERNAL MEDICINE

## 2021-12-14 PROCEDURE — 1123F ACP DISCUSS/DSCN MKR DOCD: CPT | Performed by: INTERNAL MEDICINE

## 2021-12-14 PROCEDURE — 99204 OFFICE O/P NEW MOD 45 MIN: CPT | Performed by: INTERNAL MEDICINE

## 2021-12-14 PROCEDURE — G8484 FLU IMMUNIZE NO ADMIN: HCPCS | Performed by: INTERNAL MEDICINE

## 2021-12-14 RX ORDER — RIVAROXABAN 20 MG/1
TABLET, FILM COATED ORAL
COMMUNITY
Start: 2021-10-08

## 2021-12-14 RX ORDER — DIPHENOXYLATE HYDROCHLORIDE AND ATROPINE SULFATE 2.5; .025 MG/1; MG/1
1 TABLET ORAL 4 TIMES DAILY PRN
Qty: 60 TABLET | Refills: 1 | Status: SHIPPED | OUTPATIENT
Start: 2021-12-14 | End: 2022-01-13

## 2021-12-14 RX ORDER — LOPERAMIDE HYDROCHLORIDE 2 MG/1
2 CAPSULE ORAL DAILY
Qty: 90 CAPSULE | Refills: 1 | Status: SHIPPED | OUTPATIENT
Start: 2021-12-14 | End: 2022-03-14

## 2021-12-14 ASSESSMENT — ENCOUNTER SYMPTOMS
BLOOD IN STOOL: 0
ANAL BLEEDING: 0
CHOKING: 0
ABDOMINAL PAIN: 0
ABDOMINAL DISTENTION: 0
RECTAL PAIN: 0
WHEEZING: 0
COUGH: 0
TROUBLE SWALLOWING: 0
CONSTIPATION: 0
VOMITING: 0
NAUSEA: 0
SHORTNESS OF BREATH: 0
DIARRHEA: 1

## 2021-12-14 NOTE — PROGRESS NOTES
GI CLINIC FOLLOW UP    INTERVAL HISTORY:   No referring provider defined for this encounter. Chief Complaint   Patient presents with    Diarrhea     Patient states he has had diarrhea/urgency for 1 year, it started last November when he got COVID. He states he has tried imodium cholystyramine and it did not help. He states urgent care put him on 2 abx last week. He c/o 7-8 BM daily           HISTORY OF PRESENT ILLNESS: Mr.William GARRET Vizcarra is a 67 y.o. male , referred for evaluation of* E ulcers, GIB ( was on ASA/Plavix currently on Plavix and Estanislado Malady  he said ))       Here for f/u    was admitted in [facility] 12/2020 with melena on ASA/Plavix   EGD was done as belwo   E ulcers   He is seen by multiple gastroenterologist seems to be and by surgeons had EGD and colonoscopy was done in the summer, he has cardiac issues had multiple stents in his heart  Following with Dr Julio Garcia    need ht cath, was scheduled for it but he just canceled it because of his significant diarrhea  Nevertheless he was not on any antidiarrhea medication just take over-the-counter antidiarrhea which does not work for him he said  The patient canceled his cardiac cath because he is worried about his issue with the diarrhea although the more I talked to him this patient seems to have more of an incontinence problem rather than diarrhea he does have couple bowel movements a day but is very hard for him to control it and and sometimes he just soils his clothes if he does not go right away.   His symptoms have been going on since November 2020 when he had COVID   All OTC meds nor helping, sometimes he has bowel movement  5-6x day    no blood , no black   No cramps and pain   He got blood transfusion  in 7/2021   Had egd 7/2021  Dr Mary Dorsey, he had CT enterography also which was negative for a cause of the anemia or the diarrhea that was done at [facility] in July 15, 2021  Colon 7/2021 : Angi Myles surgery was not significant either   also saw  presentingcondition. Patient's PMH/PSH,SH,PSYCH Hx, MEDs, ALLERGIES, and ROS were all reviewed and updated in the appropriate sections. PAST MEDICAL HISTORY:  Past Medical History:   Diagnosis Date    Arthritis     CAD (coronary artery disease)     stents x7    CHF (congestive heart failure) ([facility] [address]     Clinical trial participant at discharge 07/12/2016    [employer]    Diabetes mellitus ([facility] [address]     Former smoker Pt states he quit 30 years ago   Alejandrina Skfani H/O ventricular tachycardia 3/25/2017    History of blood transfusion     Hyperlipidemia     Hypertension        Past Surgical History:   Procedure Laterality Date    CARDIAC CATHETERIZATION      multiple, last 4-2016    CARDIAC DEFIBRILLATOR PLACEMENT  07/11/2016    CARDIAC DEFIBRILLATOR PLACEMENT  06/01/2020    Up grade to Bi-V    CORONARY ANGIOPLASTY      multiple last date 4-2016    CORONARY ANGIOPLASTY  02/12/2020    UPPER GASTROINTESTINAL ENDOSCOPY N/A 12/27/2020    EGD BAND LIGATION OF ESOPHAGEAL ULCERS AT GE JUNCTION performed by Jamaica Garcia MD at [facility]       CURRENT MEDICATIONS:    Current Outpatient Medications:     potassium chloride (KLOR-CON M) 20 MEQ extended release tablet, Take 1 tablet by mouth daily, Disp: 30 tablet, Rfl: 0    pantoprazole (PROTONIX) 40 MG tablet, Take 1 tablet by mouth every morning (before breakfast), Disp: 90 tablet, Rfl: 0    bumetanide (BUMEX) 2 MG tablet, Take 0.5 tablets by mouth 2 times daily, Disp: 30 tablet, Rfl: 0    ferrous sulfate (IRON 325) 325 (65 Fe) MG tablet, Take 1 tablet by mouth daily (with breakfast), Disp: 30 tablet, Rfl: 0    spironolactone (ALDACTONE) 25 MG tablet, Take 25 mg by mouth daily Take 1/2 (one-half) tablet by mouth daily, Disp: , Rfl:     escitalopram (LEXAPRO) 20 MG tablet, Take 20 mg by mouth daily Take one tablet by mouth daily, Disp: , Rfl:     gabapentin (NEURONTIN) 600 MG tablet, Take 600 mg by mouth 3 times daily.  Take 2 tablets by mouth three times daily, Disp: , Rfl:     sacubitril-valsartan (ENTRESTO)  MG per tablet, Take 1 tablet by mouth 2 times daily , Disp: , Rfl:     zolpidem (AMBIEN) 10 MG tablet, Take by mouth nightly as needed for Sleep., Disp: , Rfl:     nitroGLYCERIN (NITROSTAT) 0.4 MG SL tablet, Place 1 tablet under the tongue every 5 minutes as needed for Chest pain up to max of 3 total doses.  If no relief after 1 dose, call 911., Disp: 25 tablet, Rfl: 3    atorvastatin (LIPITOR) 80 MG tablet, Take 1 tablet by mouth nightly, Disp: 30 tablet, Rfl: 3    Omega-3 Fatty Acids (FISH OIL PO), Take 1 tablet by mouth 2 times daily, Disp: , Rfl:     folic acid (FOLVITE) 1 MG tablet, Take 1 mg by mouth daily, Disp: , Rfl:     clopidogrel (PLAVIX) 75 MG tablet, Take 75 mg by mouth daily, Disp: , Rfl:     HYDROcodone-acetaminophen (NORCO) 7.5-325 MG per tablet, Take 1 tablet by mouth every 6 hours as needed for Pain. , Disp: , Rfl:     XARELTO 20 MG TABS tablet, , Disp: , Rfl:     pantoprazole (PROTONIX) 40 MG tablet, Take 1 tablet by mouth 2 times daily (before meals), Disp: 120 tablet, Rfl: 0    ALLERGIES:   No Known Allergies    FAMILY HISTORY:       Problem Relation Age of Onset    Heart Failure Mother          SOCIAL HISTORY:   Social History     Socioeconomic History    Marital status:      Spouse name: Jesse Hou Number of children: 3    Years of education: Not on file    Highest education level: Not on file   Occupational History    Not on file   Tobacco Use    Smoking status: Former Smoker     Packs/day: 1.00     Years: 7.00     Pack years: 7.00     Quit date:      Years since quittin.9    Smokeless tobacco: Never Used   Vaping Use    Vaping Use: Never used   Substance and Sexual Activity    Alcohol use: Not Currently     Alcohol/week: 1.0 standard drink     Types: 1 Cans of beer per week    Drug use: No    Sexual activity: Not on file   Other Topics Concern    Not on file   Social History Narrative    Not on file Social Determinants of Health     Financial Resource Strain:     Difficulty of Paying Living Expenses: Not on file   Food Insecurity:     Worried About Running Out of Food in the Last Year: Not on file    Selena of Food in the Last Year: Not on file   Transportation Needs:     Lack of Transportation (Medical): Not on file    Lack of Transportation (Non-Medical): Not on file   Physical Activity:     Days of Exercise per Week: Not on file    Minutes of Exercise per Session: Not on file   Stress:     Feeling of Stress : Not on file   Social Connections:     Frequency of Communication with Friends and Family: Not on file    Frequency of Social Gatherings with Friends and Family: Not on file    Attends [employer] Services: Not on file    Active Member of [address] [place] [address] or Organizations: Not on file    Attends Club or Organization Meetings: Not on file    Marital Status: Not on file   Intimate Partner Violence:     Fear of Current or Ex-Partner: Not on file    Emotionally Abused: Not on file    Physically Abused: Not on file    Sexually Abused: Not on file   Housing Stability:     Unable to Pay for Housing in the Last Year: Not on file    Number of [place] in the Last Year: Not on file    Unstable Housing in the Last Year: Not on file       REVIEW OF SYSTEMS: A 12-point review of systemswas obtained and pertinent positives and negatives were enumerated above in the history of present illness. All other reviewed systems / symptoms were negative. Review of Systems   Constitutional: Negative for appetite change, fatigue and unexpected weight change. HENT: Negative for trouble swallowing. Eyes: Positive for visual disturbance (glasses). Respiratory: Negative for cough, choking, shortness of breath and wheezing. Cardiovascular: Negative for chest pain, palpitations and leg swelling. Gastrointestinal: Positive for diarrhea.  Negative for abdominal distention, abdominal pain, anal bleeding, blood in stool, constipation, nausea, rectal pain and vomiting. Genitourinary: Negative for difficulty urinating. Allergic/Immunologic: Negative for environmental allergies and food allergies. Neurological: Negative for dizziness, weakness, light-headedness, numbness and headaches. Hematological: Bruises/bleeds easily. Psychiatric/Behavioral: Negative for sleep disturbance. The patient is not nervous/anxious. LABORATORY DATA: Reviewed  Lab Results   Component Value Date    WBC 7.5 12/30/2020    HGB 8.5 (L) 12/30/2020    HCT 27.0 (L) 12/30/2020    .0 12/30/2020     12/30/2020     12/30/2020    K 3.6 (L) 12/30/2020     (H) 12/30/2020    CO2 20 12/30/2020    BUN 26 (H) 12/30/2020    CREATININE 1.05 12/30/2020    LABALBU 2.9 (L) 12/27/2020    BILITOT 0.24 (L) 12/27/2020    ALKPHOS 51 12/27/2020    AST 22 12/27/2020    ALT 21 12/27/2020    INR 1.1 12/27/2020         Lab Results   Component Value Date    RBC 2.70 (L) 12/30/2020    HGB 8.5 (L) 12/30/2020    .0 12/30/2020    MCH 31.5 12/30/2020    MCHC 31.5 12/30/2020    RDW 15.4 (H) 12/30/2020    MPV 9.2 12/30/2020    BASOPCT 1 12/30/2020    LYMPHSABS 1.56 12/30/2020    MONOSABS 0.67 12/30/2020    NEUTROABS 4.73 12/30/2020    EOSABS 0.26 12/30/2020    BASOSABS 0.04 12/30/2020         DIAGNOSTIC TESTING:     No results found. PHYSICAL EXAMINATION: Vital signs reviewed per the nursing documentation. /60   Pulse 75   Temp 97.2 °F (36.2 °C)   Wt 214 lb (97.1 kg)   BMI 32.54 kg/m²   Body mass index is 32.54 kg/m². Physical Exam  Vitals and nursing note reviewed. Constitutional:       General: He is not in acute distress. Appearance: He is well-developed. He is not diaphoretic. HENT:      Head: Normocephalic and atraumatic. Eyes:      General: No scleral icterus. Pupils: Pupils are equal, round, and reactive to light. Neck:      Thyroid: No thyromegaly. Vascular: No JVD. Trachea: No tracheal deviation. Cardiovascular:      Rate and Rhythm: Normal rate and regular rhythm. Heart sounds: Normal heart sounds. No murmur heard. Pulmonary:      Effort: Pulmonary effort is normal. No respiratory distress. Breath sounds: Normal breath sounds. No wheezing. Abdominal:      General: Bowel sounds are normal. There is no distension. Palpations: Abdomen is soft. There is no mass. Tenderness: There is no abdominal tenderness. There is no guarding or rebound. Musculoskeletal:         General: No tenderness. Normal range of motion. Cervical back: Normal range of motion and neck supple. Skin:     General: Skin is warm. Coloration: Skin is not pale. Findings: No erythema or rash. Comments: He is not diaphoretic   Neurological:      Mental Status: He is alert and oriented to person, place, and time. Deep Tendon Reflexes: Reflexes are normal and symmetric. Psychiatric:         Behavior: Behavior normal.         Thought Content: Thought content normal.         Judgment: Judgment normal.           IMPRESSION: Mr. Connor Peguero is a 67 y.o. male with *     Diagnosis Orders   1.  Diarrhea, unspecified type  diphenoxylate-atropine (DIPHENATOL) 2.5-0.025 MG per tablet   With this patient has significant diarrhea with and probably incontinence affecting his life so barely to the point where he is canceling his life-threatening cardiac procedures and follow-up, he has been scoped and studied as above for which I do want to repeat those I felt the best thing at this time to control his symptoms for which I told him to take Imodium on a daily basis every day whether he has the diarrhea or not, and take the Lomotil on top of it when he has diarrhea or if he has any plans for meeting or visit or dinner etc.  I strongly advised him to talk to Dr. Marianna Parkinson and schedule his cardiac work-up as soon as possible  Diet/life style/natural hx /complication of the dx were all explained in details   Past medical, past surgical, social history, psychiatric history, medications or allergies, all reviewed and  updated    Thank you for allowing me to participate in the care of Mr. Selby Carrel. For any further questions please do not hesitate to contact me. I have reviewed and agree with the ROS entered by the MA/RN. Note is dictated utilizing voice recognition software. Unfortunately this leads to occasional typographical errors. Please contact our office if you have any questions.       Marta Jay MD  [facility]  O: #[phone]

## 2022-01-10 ENCOUNTER — HOSPITAL ENCOUNTER (OUTPATIENT)
Dept: CARDIAC CATH/INVASIVE PROCEDURES | Age: 77
Discharge: HOME | End: 2022-01-10
Payer: MEDICARE

## 2022-01-10 VITALS
DIASTOLIC BLOOD PRESSURE: 66 MMHG | OXYGEN SATURATION: 99 % | SYSTOLIC BLOOD PRESSURE: 109 MMHG | BODY MASS INDEX: 32.58 KG/M2 | RESPIRATION RATE: 17 BRPM | HEART RATE: 70 BPM | HEIGHT: 68 IN | TEMPERATURE: 98.1 F | WEIGHT: 215 LBS

## 2022-01-10 LAB
GFR NON-AFRICAN AMERICAN: 46 ML/MIN
GFR SERPL CREATININE-BSD FRML MDRD: 56 ML/MIN
GFR SERPL CREATININE-BSD FRML MDRD: ABNORMAL ML/MIN/{1.73_M2}
GLUCOSE BLD-MCNC: 159 MG/DL (ref 74–100)
PLATELET # BLD: 145 K/UL (ref 138–453)
POC BUN: 23 MG/DL (ref 8–26)
POC CHLORIDE: 104 MMOL/L (ref 98–107)
POC CREATININE: 1.49 MG/DL (ref 0.51–1.19)
POC HEMATOCRIT: 32 % (ref 41–53)
POC HEMOGLOBIN: 10.8 G/DL (ref 13.5–17.5)
POC POTASSIUM: 4.2 MMOL/L (ref 3.5–4.5)
POC SODIUM: 142 MMOL/L (ref 138–146)

## 2022-01-10 PROCEDURE — 82435 ASSAY OF BLOOD CHLORIDE: CPT

## 2022-01-10 PROCEDURE — 84295 ASSAY OF SERUM SODIUM: CPT

## 2022-01-10 PROCEDURE — 7100000001 HC PACU RECOVERY - ADDTL 15 MIN

## 2022-01-10 PROCEDURE — 84132 ASSAY OF SERUM POTASSIUM: CPT

## 2022-01-10 PROCEDURE — 85049 AUTOMATED PLATELET COUNT: CPT

## 2022-01-10 PROCEDURE — C1760 CLOSURE DEV, VASC: HCPCS

## 2022-01-10 PROCEDURE — 82565 ASSAY OF CREATININE: CPT

## 2022-01-10 PROCEDURE — 6360000002 HC RX W HCPCS

## 2022-01-10 PROCEDURE — 82947 ASSAY GLUCOSE BLOOD QUANT: CPT

## 2022-01-10 PROCEDURE — 85014 HEMATOCRIT: CPT

## 2022-01-10 PROCEDURE — 2709999900 HC NON-CHARGEABLE SUPPLY

## 2022-01-10 PROCEDURE — 2580000003 HC RX 258: Performed by: INTERNAL MEDICINE

## 2022-01-10 PROCEDURE — 84520 ASSAY OF UREA NITROGEN: CPT

## 2022-01-10 PROCEDURE — 93458 L HRT ARTERY/VENTRICLE ANGIO: CPT

## 2022-01-10 PROCEDURE — 7100000000 HC PACU RECOVERY - FIRST 15 MIN

## 2022-01-10 PROCEDURE — C1894 INTRO/SHEATH, NON-LASER: HCPCS

## 2022-01-10 RX ORDER — GLIMEPIRIDE 1 MG/1
2 TABLET ORAL EVERY EVENING
COMMUNITY

## 2022-01-10 RX ORDER — MECLIZINE HYDROCHLORIDE 25 MG/1
25 TABLET ORAL 3 TIMES DAILY PRN
COMMUNITY

## 2022-01-10 RX ORDER — FAMOTIDINE 20 MG
TABLET ORAL
COMMUNITY

## 2022-01-10 RX ORDER — ESCITALOPRAM OXALATE 10 MG/1
10 TABLET ORAL DAILY
COMMUNITY

## 2022-01-10 RX ORDER — ALLOPURINOL 300 MG/1
300 TABLET ORAL DAILY
COMMUNITY

## 2022-01-10 RX ORDER — SODIUM CHLORIDE 9 MG/ML
INJECTION, SOLUTION INTRAVENOUS CONTINUOUS
Status: DISCONTINUED | OUTPATIENT
Start: 2022-01-10 | End: 2022-01-11 | Stop reason: HOSPADM

## 2022-01-10 RX ADMIN — SODIUM CHLORIDE: 9 INJECTION, SOLUTION INTRAVENOUS at 12:37

## 2022-01-10 NOTE — PROGRESS NOTES
Patient returned to room. Post cath recovery initiated. Patient awake and alert. Family at bedside, right groin with band aid intact. No hematoma or drainage noted. Side rails up times 2, call light with in reach.

## 2022-01-10 NOTE — PROGRESS NOTES
Dr. Hernandez Best informed of creatinine of 1.49. Orders received to run normal saline at 50m l/hr.   Orders received and carried out

## 2022-01-10 NOTE — H&P
mouth 4 times daily as needed for Diarrhea for up to 30 days. 12/14/21 1/13/22  Sarthak Crespo MD   potassium chloride (KLOR-CON M) 20 MEQ extended release tablet Take 1 tablet by mouth daily 12/30/20   Fernanda Price MD   pantoprazole (PROTONIX) 40 MG tablet Take 1 tablet by mouth 2 times daily (before meals) 12/30/20 2/28/21  Mart Mortensen MD   pantoprazole (PROTONIX) 40 MG tablet Take 1 tablet by mouth every morning (before breakfast) 12/30/20   Mart Mortensen MD   bumetanide (BUMEX) 2 MG tablet Take 0.5 tablets by mouth 2 times daily 12/30/20   Fernanda Price MD   ferrous sulfate (IRON 325) 325 (65 Fe) MG tablet Take 1 tablet by mouth daily (with breakfast) 12/30/20   Conard Lesch, MD   spironolactone (ALDACTONE) 25 MG tablet Take 25 mg by mouth daily Take 1/2 (one-half) tablet by mouth daily    Historical Provider, MD   escitalopram (LEXAPRO) 20 MG tablet Take 20 mg by mouth daily Take one tablet by mouth daily    Historical Provider, MD   gabapentin (NEURONTIN) 600 MG tablet Take 600 mg by mouth 3 times daily. Take 2 tablets by mouth three times daily    Historical Provider, MD   sacubitril-valsartan (ENTRESTO)  MG per tablet Take 1 tablet by mouth 2 times daily     Historical Provider, MD   zolpidem (AMBIEN) 10 MG tablet Take by mouth nightly as needed for Sleep. Historical Provider, MD   nitroGLYCERIN (NITROSTAT) 0.4 MG SL tablet Place 1 tablet under the tongue every 5 minutes as needed for Chest pain up to max of 3 total doses.  If no relief after 1 dose, call 911. 3/28/17   SHANDRA Goodman - CNP   atorvastatin (LIPITOR) 80 MG tablet Take 1 tablet by mouth nightly 1/23/17   Loyda Turner DO   Omega-3 Fatty Acids (FISH OIL PO) Take 1 tablet by mouth 2 times daily    Historical Provider, MD   folic acid (FOLVITE) 1 MG tablet Take 1 mg by mouth daily    Historical Provider, MD   clopidogrel (PLAVIX) 75 MG tablet Take 75 mg by mouth daily    Historical Provider, MD   HYDROcodone-acetaminophen (NORCO) 7.5-325 MG per tablet Take 1 tablet by mouth every 6 hours as needed for Pain. Historical Provider, MD         There were no vitals filed for this visit. Constitutional and General Appearance:   alert, cooperative, no distress and appears stated age  [de-identified]:  · PERRL, EOMI  Respiratory:  · Normal excursion and expansion without use of accessory muscles  · Resp Auscultation:  Good respiratory effort. No for increased work of breathing. On auscultation: clear to auscultation bilaterally  Cardiovascular:  · Regular rate and rhythm. · S1/S2  · No murmurs. · The apical impulse is not displaced  Abdomen:  · Soft  · Bowel sounds present  · Non-tender to palpation  Extremities:  · No cyanosis or clubbing  · Lower extremity edema: No.  Skin:  · Warm and dry  Neurological:  · Alert and oriented. · Moves all extremities well              Plan:  · Proceed with planned procedure. · Further orders to follow. Risks, benefits, and alternatives of cardiac catheterization were discussed, in detail, with patient. Risks include, but not limited to, bleeding, requiring blood transfusion, vascular complication requiring surgery, renal failure with need of dialysis, CVA, MI, death and anesthesia complications including intubation were discussed. Patient verbalized understanding and agreed to proceed with the procedure understanding the above risks and alternatives to the procedure.     Jaciel Whitfield MD       Cardiovascular Fellow PGY-4  1/10/2022, 9:40 AM

## 2022-01-10 NOTE — OP NOTE
[facility]    CARDIAC CATHETERIZATION    Date:   1/10/2022  Patient name:  Neli Peck  Date of admission:  1/10/2022 11:36 AM  MRN:   [id number]  YOB: 1945    Operators:  Primary:   Cal Scales MD (Attending Physician)      Procedure performed:     [x] Left Heart Catheterization. [] Graft Angiography. [x] Left Ventriculography. [] Right Heart Catheterization. [x] Coronary Angiography. [] Aortic Valve Studies. [] PCI:      [] Other:       Pre Procedure Conscious Sedation Data:  ASA Class:    [] I [x] II [] III [] IV    Mallampati Class:  [] I [x] II [] III [] IV      Indication:  [] STEMI      [x] + Stress test  [] ACS      [] + EKG Changes  [] Non Q MI       [] Significant Risk Factors  [x] Recurrent Angina             [] Diabetes Mellitus    [] New LBBB      [] Other.  [] CHF / Low EF changes     [] Abnormal CTA / Ca Score      Procedure:  Access:  [x] Femoral  [] Radial  artery       [x] Right  [] Left    Procedure: After informed consent was obtained with explanation of the risks and benefits, patient was brought to the cath lab. The access area was prepped and draped in sterile fashion. 1% lidocaine was used for local block. The artery was cannulated with 6  Fr sheath with brisk arterial blood return. The side port was frequently flushed and aspirated with normal saline. Estimated Blood Loss:  [x] Minimal < 25 cc [] Moderate 25-50 cc  [] >50 cc    Findings:          LMCA: Normal 0% stenosis. LAD: Mid area 100% stenosis (Extremely small vessel)   D1: Minimal 25% stenosis     LCx: Mild irregularities 10-20%. Patent stents in mid and distal area     RCA: Patent mid stent area     The LV gram was performed in the MONTALVO 30 position. LVEF: 25 %.        Conclusions:     Patent RCA and LCX stents    Totally occluded mid LAD stents (Because of the small sizwe of the vessel    with L-L collateral)    Severe LV dysfunction, with anterior wall akinesia Recommendations:  Post-cath protocol  Continue optimal medical therapy  Risk factor modification          Electronically signed on 1/10/2022 at 1:59 PM by:    Freedom Art MD  Fellow, [address]      I have reviewed the case / procedure with resident / fellow  I have examined the patient personally  Patient agree with treatment plan as discussed before, final arrangement based on my evaluation and exam.    Risk and benefit of procedure planned were explained in details. Procedure was performed by me personally, with all aspect of the procedure being done using standard protocol. Note was modified based on my own assessment and treatment.     Echo August MD  [facility]

## 2022-01-10 NOTE — PROGRESS NOTES
Patient admitted, consent signed and questions answered. Patient ready for procedure. Call light to reach with side rails up 2 of 2.bilateral groin clipped. Family at bedside with patient. History and physical needs to be completed.

## 2022-10-25 ENCOUNTER — HOSPITAL ENCOUNTER (OUTPATIENT)
Dept: GENERAL RADIOLOGY | Facility: CLINIC | Age: 77
Discharge: HOME | End: 2022-10-27
Payer: MEDICARE

## 2022-10-25 DIAGNOSIS — I25.10 CARDIOVASCULAR DISEASE: ICD-10-CM

## 2022-10-25 PROCEDURE — 71046 X-RAY EXAM CHEST 2 VIEWS: CPT

## 2022-11-29 ENCOUNTER — HOSPITAL ENCOUNTER (OUTPATIENT)
Dept: CARDIAC CATH/INVASIVE PROCEDURES | Age: 77
Discharge: HOME | End: 2022-11-29
Payer: MEDICARE

## 2022-11-29 VITALS
OXYGEN SATURATION: 96 % | SYSTOLIC BLOOD PRESSURE: 97 MMHG | RESPIRATION RATE: 17 BRPM | TEMPERATURE: 98.1 F | DIASTOLIC BLOOD PRESSURE: 67 MMHG | HEART RATE: 64 BPM | WEIGHT: 214 LBS | BODY MASS INDEX: 32.43 KG/M2 | HEIGHT: 68 IN

## 2022-11-29 LAB
EGFR, POC: 39 ML/MIN/1.73M2
GLUCOSE BLD-MCNC: 139 MG/DL (ref 74–100)
POC BUN: 41 MG/DL (ref 8–26)
POC CHLORIDE: 105 MMOL/L (ref 98–107)
POC CREATININE: 1.78 MG/DL (ref 0.51–1.19)
POC HEMATOCRIT: 41 % (ref 41–53)
POC HEMOGLOBIN: 14.1 G/DL (ref 13.5–17.5)
POC POTASSIUM: 4.3 MMOL/L (ref 3.5–4.5)
POC SODIUM: 140 MMOL/L (ref 138–146)

## 2022-11-29 PROCEDURE — 82435 ASSAY OF BLOOD CHLORIDE: CPT

## 2022-11-29 PROCEDURE — 85014 HEMATOCRIT: CPT

## 2022-11-29 PROCEDURE — 82947 ASSAY GLUCOSE BLOOD QUANT: CPT

## 2022-11-29 PROCEDURE — 7100000001 HC PACU RECOVERY - ADDTL 15 MIN

## 2022-11-29 PROCEDURE — 2709999900 HC NON-CHARGEABLE SUPPLY

## 2022-11-29 PROCEDURE — 82565 ASSAY OF CREATININE: CPT

## 2022-11-29 PROCEDURE — 93325 DOPPLER ECHO COLOR FLOW MAPG: CPT

## 2022-11-29 PROCEDURE — 84520 ASSAY OF UREA NITROGEN: CPT

## 2022-11-29 PROCEDURE — 6360000002 HC RX W HCPCS

## 2022-11-29 PROCEDURE — 84132 ASSAY OF SERUM POTASSIUM: CPT

## 2022-11-29 PROCEDURE — 84295 ASSAY OF SERUM SODIUM: CPT

## 2022-11-29 PROCEDURE — 93312 ECHO TRANSESOPHAGEAL: CPT

## 2022-11-29 PROCEDURE — 7100000000 HC PACU RECOVERY - FIRST 15 MIN

## 2022-11-29 RX ORDER — LEVOTHYROXINE SODIUM 0.07 MG/1
75 TABLET ORAL DAILY
COMMUNITY

## 2022-11-29 RX ORDER — SODIUM CHLORIDE 9 MG/ML
INJECTION, SOLUTION INTRAVENOUS CONTINUOUS
Status: DISCONTINUED | OUTPATIENT
Start: 2022-11-29 | End: 2022-11-30 | Stop reason: HOSPADM

## 2022-11-29 RX ORDER — CHOLESTYRAMINE LIGHT 4 G/5.7G
4 POWDER, FOR SUSPENSION ORAL 2 TIMES DAILY
COMMUNITY

## 2022-11-29 RX ADMIN — SODIUM CHLORIDE: 9 INJECTION, SOLUTION INTRAVENOUS at 09:35

## 2022-11-29 NOTE — H&P
[facility]    Consult / H&P               Today's Date: 11/29/2022  Patient Name: Ellis Euceda  Date of admission: 11/29/2022  8:29 AM  Patient's age: 67 y. o., 1945  Admission Dx: A-fib [facility]) [I48.91]    Reason for Consult:  Cardiac evaluation    Requesting Physician: No admitting provider for patient encounter. CHIEF COMPLAINT:  for pre watchman CATHY    History Obtained From:  patient, electronic medical record    HISTORY OF PRESENT ILLNESS:    This patient with past medical history given below, was seen at the office by Dr. Adrian Juarez 9/12/2022 for evaluation of watchman device as patient is not a candidate for anticoagulation due to GI bleed. Patient is here for Pre watchman CATHY. Patient reported no symptoms. Past Medical History:   has a past medical history of Arthritis, CAD (coronary artery disease), CHF (congestive heart failure) ([facility] [address] Clinical trial participant at discharge, Diabetes mellitus ([facility] [address] Former smoker, H/O ventricular tachycardia, History of blood transfusion, Hyperlipidemia, and Hypertension. Past Surgical History:   has a past surgical history that includes Cardiac catheterization; Coronary angioplasty; Cardiac defibrillator placement (07/11/2016); Coronary angioplasty (02/12/2020); Cardiac defibrillator placement (06/01/2020); Upper gastrointestinal endoscopy (N/A, 12/27/2020); and Colonoscopy. Home Medications:    Prior to Admission medications    Medication Sig Start Date End Date Taking?  Authorizing Provider   escitalopram (LEXAPRO) 10 MG tablet Take 10 mg by mouth daily    Historical Provider, MD   Vitamin D, Cholecalciferol, 25 MCG (1000 UT) CAPS Take by mouth Takes on Monday    Historical Provider, MD   glimepiride (AMARYL) 1 MG tablet Take 2 mg by mouth every evening    Historical Provider, MD   metFORMIN (GLUCOPHAGE) 1000 MG tablet Take 1,000 mg by mouth Daily with supper    Historical Provider, MD   allopurinol (ZYLOPRIM) 300 MG tablet Take 300 by mouth daily    Historical Provider, MD   clopidogrel (PLAVIX) 75 MG tablet Take 75 mg by mouth daily    Historical Provider, MD   HYDROcodone-acetaminophen (NORCO) 7.5-325 MG per tablet Take 1 tablet by mouth every 6 hours as needed for Pain. Historical Provider, MD     Allergies:  Patient has no known allergies. Social History:   reports that he quit smoking about 54 years ago. He has a 7.00 pack-year smoking history. He has never used smokeless tobacco. He reports that he does not currently use alcohol after a past usage of about 1.0 standard drink per week. He reports that he does not use drugs. Family History: family history includes Heart Failure in his mother. REVIEW OF SYSTEMS:    Constitutional: there has been no unanticipated weight loss. There's been No change in energy level, No change in activity level. Eyes: No visual changes or diplopia. No scleral icterus. ENT: No Headaches  Cardiovascular: as above  Respiratory: No previous pulmonary problems, No cough  Gastrointestinal: No abdominal pain. No change in bowel or bladder habits. Genitourinary: No dysuria, trouble voiding, or hematuria. Musculoskeletal:  No gait disturbance, No weakness or joint complaints. Integumentary: No rash or pruritis. Neurological: No headache, diplopia, change in muscle strength, numbness or tingling. No change in gait, balance, coordination, mood, affect, memory, mentation, behavior. Psychiatric: No anxiety, or depression. Endocrine: No temperature intolerance. No excessive thirst, fluid intake, or urination. No tremor. Hematologic/Lymphatic: No abnormal bruising or bleeding, blood clots or swollen lymph nodes. Allergic/Immunologic: No nasal congestion or hives. PHYSICAL EXAM:      There were no vitals taken for this visit. Constitutional and General Appearance: alert, cooperative, no distress and appears stated age  [de-identified]: PERRL, no cervical lymphadenopathy. No masses palpable.  Normal oral mucosa  Respiratory:  Normal excursion and expansion without use of accessory muscles  Resp Auscultation: Good respiratory effort. No for increased work of breathing. On auscultation: clear to auscultation bilaterally  Cardiovascular: The apical impulse is not displaced  Heart tones are crisp and normal. regular S1 and S2.  Jugular venous pulsation Normal  The carotid upstroke is normal in amplitude and contour without delay or bruit  Peripheral pulses are symmetrical and full   Abdomen:   No masses or tenderness  Bowel sounds present  Extremities:   No Cyanosis or Clubbing   Lower extremity edema: No   Skin: Warm and dry  Neurological:  Alert and oriented. Moves all extremities well  No abnormalities of mood, affect, memory, mentation, or behavior are noted    DATA:    Medical History  1. Previous PTCA - stent of the RCA and LAD in 2003 and 2005. 2. Repeat heart catheterization requiring repeat stenting of the LAD and RCA in 11/2007. 3. Hypertension. 4. Hyperlipidemia. 5. History of DVT requiring previous Coumadin treatment. 6. Type 2 diabetes mellitus. 7. Ischemic cardiomyopathy with most recent 2D echo from 1/20/2017 showing LVEF 35-40% with inferobasal akinesis, with moderate mitral regurgitation and mild aortic insufficiency. There was grade I diastolic dysfunction. 8. Cardiac cath from 1/18/17 showing three vessel CAD with patent stents in the LAD, OM1 and RCA, with new stenotic lesions in the mid and distal RCA, requiring PTCA and drug eluting stent. 9. Implantation of prophylactic ICD originally on 7/11/16 with Medtronic Evera XT VR, along with Medtronic 9948 lead. 10. 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   11. Subsequent radiofrequency ablation of multiple ventricular tachycardias at [address] [place] [address],[address] (Dr. Jie Juarez) with 7 different morphologies of ventricular tachycardia with a clinical tachycardia easily ablated. 12. Suspected vasovagal syndrome and sick sinus syndrome with resting sinus rates in the 50s most of the time. 13. History of tinnitus and peripheral neuropathy with extensive evaluation in 2017. Amiodarone stopped late 2017 with no improvement in tinnitus. 14. Echocardiogram February 2019 suggesting that the patient has EF about 35% with PA pressure 44 mm Hg. 15. Repeat cardiac catheterization on 2/3/20 showed severe disease in the LAD and LCX; CV Surgery felt pt was too high risk for CABG  16. PCI (2/12/20) including PTCA and 3 PO to proximal, mid and distal LAD, and with 2 PO to mid and distal LCX. 17. ECHO on 02/03/2020 showed technically difficult study due to patient laying supine post catheterization. Moderate concentric LVH. LV mildly enlarged. Mild to moderately reduced LV systolic function with EF 35%. Grade 2 LV diastolic dysfunction. Mild TR.  18. Gout diagnosed in Nov 2020. 19. Upper GI bleeding with PUD in Dec 2020. 20. Device interrogation suggested episodes of atrial flutter/fib. 4/2021. Started Eliquis. 21. ECHO 8/23/2021. LVEF 30-35% G1DD. Mild to Mod MR. Mild TR. RVSP 38 mmHg. 22. Stress test 11/2021 Apical lateral ischemia, L inferior infarct EF 33%, Global hypokinesis  23. Cardiac cath 1/10/22 Patent RCA/LCX, Totally occluded Mid LAD stent. Very small vessel size and L-L collateral. Severe LV dysfunction    Labs:  CBC: No results for input(s): WBC, HGB, HCT, PLT in the last 72 hours. BMP: No results for input(s): NA, K, CO2, BUN, CREATININE, LABGLOM, GLUCOSE in the last 72 hours. BNP: No results for input(s): BNP in the last 72 hours. PT/INR: No results for input(s): PROTIME, INR in the last 72 hours. APTT:No results for input(s): APTT in the last 72 hours. CARDIAC ENZYMES:No results for input(s): CKTOTAL, CKMB, CKMBINDEX, TROPONINI in the last 72 hours.   FASTING LIPID PANEL:No results found for: HDL, LDLDIRECT, LDLCALC, TRIG  LIVER PROFILE:No results for input(s): AST, ALT, LABALBU in the last 72 hours. IMPRESSION:    Paroxysmal atrial fibrillation by device interrogation - Celeste Vas score: 5 Due to his high risk of bleeding including GI bleeding and transfusion and based on the recommendation from the GI specialist probably will be a candidate for the Watchman procedure to minimize using the Xarelto for him for that specific medication. RECOMMENDATIONS:  We will proceed with CATHY. Further recommendation to follow after CATHY. Transesophageal echocardiography (procedure) has been fully reviewed with the patient and written informed consent has been obtained. The risks, benefits, and alternatives were discussed, in detail, with patient. Briefly, the potential risks include, but are not limited to, bleeding, damage to teeth or pharynx, esophageal damage or rupture, chamber perforation, tamponade, respiratory failure requiring intubation, need for emergent surgery, and even death. All questions and concerns were answered. Patient agreed to proceed with the procedure understanding the above risks and alternatives to the procedure. Cassie Mathew MD. PGY- 4  Cardiology Fellow  [facility], [place]  11/29/2022, 8:43 AM      I reviewed the patient history personally, and examined by me during the visit. I have reviewed the H&P/Consult / Progress note as completed, and made appropriate changes to the patient exam and treatment plans.       I have reviewed the case in details including physical exam and treatment plan with resident / fellow / NP    Patient treatment plan was explained to patient, correction in notes was made as appropriate, and discussed final arrangement based on  my evaluation and exam.    Additional Recommendations:      Margarita Lsia MD  [facility]

## 2022-11-29 NOTE — PROGRESS NOTES
Received post procedure to [facility] to room 4. Assessment obtained. Restrictions reviewed with patient. Post procedure pathway initiated. Family at side. Patient without complaints.

## 2022-11-29 NOTE — OP NOTE
[facility]  Transesophageal Echocardiogram       Today's Date:  11/29/2022  Ordering Physician:  Dr Ryan Ribeiro  Indication:   Pre-Watchman CATHY    Operators:  Primary:   Dr Ryan Ribeiro (Attending Physician)  Assistant:   Leif Edwards MD (Cardiovascular Fellow)      Pre Procedure Conscious Sedation Data:    ASA Class:    [] I [x] II [] III [] IV    Mallampati Class:  [] I [x] II [] III [] IV    Procedure:    Patient seen and examined. History and Physical reviewed. Labs reviewed. After informed consent was obtained with explanation of the risks and benefits, the patient was brought to cardiac cath lab. All sedation was administered by the cardiologist. The oropharynx was pre-anesthesized with viscous lidocaine and cetacaine spray. The ultrasound probe was passed without any difficulty. CATHY findings:  LA:  Normal  LEONARDA:  No thrombus or clot. LEONARDA measurements:   @0 degree: 2.5cm width by 3.5cm depth   @45 degree: 2.0cm width by 3.2cm depth   @95 degree: 1.6cm width by 3.5cm depth   @135 degree: 2.5cm width by 3.8cm depth   RA:  Normal  RV:   Normal  LV:  Normal  Estimated LVEF: 40-45%  Aorta:   Mild atheromatous disease arch  Pericardium: No pericardial effusion    Valves:  Mitral Valve: Structurally normal. mild regurgitation is identified. Aortic Valve: The aortic valve is trileaflet and opens adequately. mild regurgitiation is identified. Tricuspid valve: Structurally normal. No regurgitation is identified. Pulmonary valve: Normal. No significant regurgitation    No valvular vegetations or thrombus identified. Summary:   1. A CATHY was performed without complications. 2. LVEF 40-45%  3. LA:  Normal  4. LEONARDA: No thrombus or clot. 5.  LEONARDA measurements:   @0 degree: 2.5cm width by 3.5cm depth   @45 degree: 2.0cm width by 3.2cm depth   @95 degree: 1.6cm width by 3.5cm depth   @135 degree: 2.5cm width by 3.8cm depth           Leif Edwards MD. PGY- 4  Fellow, cardiovascular disease   [facility], [place], [place]  11/29/2022, 10:04 AM        I have reviewed the case / procedure with resident / fellow  I have examined the patient personally  Patient agree with treatment plan as discussed before, final arrangement based on my evaluation and exam.    Risk and benefit of procedure planned were explained in details. Procedure was performed by me personally, with all aspect of the procedure being done using standard protocol. Note was modified based on my own assessment and treatment.     Shahida Daily MD  [facility]

## 2022-11-29 NOTE — DISCHARGE INSTRUCTIONS
TRANSESOPHAGEAL ECHOCARDIOGRAM / CATHY DISCHARGE INSTRUCTIONS    If the following occurs call your physician or seek help    -trouble with swallowing    -spitting or coughing up blood    -severe pain in the throat region / your throat can be sore for several days but pain should not increase as days continue    Do not drive or operate heavy machinery today due to sedation            Transesophageal Echocardiogram:  What is a transesophageal echocardiogram?     A transesophageal echocardiogram is a test to help your doctor look at the inside of your heart. A small device called a transducer directs sound waves toward your heart. The sound waves make a picture of the heart's valves and chambers. Your doctor may do this test to look for certain types of heart disease. Or it may be done to see how disease is affecting your heart. You will be given medicine to make you sleepy and comfortable during the test.  The doctor puts a small, flexible tube into your throat and guides it to the esophagus. This is the tube that connects your mouth to your stomach. The doctor will ask you to swallow as the tube goes down. The transducer is at the tip of the tube. It gets close to your heart to make clear pictures. The doctor will look at the ultrasound pictures on a screen. You will not be able to eat or drink until the numbness from the throat spray wears off. Your throat may be sore for a few days after the test.  Follow-up care is a key part of your treatment and safety. Be sure to make and go to all appointments, and call your doctor if you are having problems. It's also a good idea to know your test results and keep a list of your current medications. Going home  Be sure you have someone to drive you home. Anesthesia and pain medicine make it unsafe for you to drive. Where can you learn more? Go to https://aleksandreb.[email or web address] org and sign in to your [employer] account.  Enter K500 in the [address] Information box to learn more about Transesophageal Echocardiogram: Before Your Procedure.     If you do not have an account, please click on the Sign Up Now link. © 9740-2062 Healthwise, Incorporated. Care instructions adapted under license by [facility] ([facility]). This care instruction is for use with your licensed healthcare professional. If you have questions about a medical condition or this instruction, always ask your healthcare professional. [address] any warranty or liability for your use of this information.   Content Version: 41.9.967698; Current as of: February 20, 2015

## 2022-11-29 NOTE — PROGRESS NOTES
Patient admitted, consent signed and questions answered. Patient ready for procedure. Call light to reach with side rails up 2 of 2. Lois Saha (spouse) at bedside with patient. History and physical needs to be completed.

## 2022-12-05 ENCOUNTER — ANESTHESIA EVENT (OUTPATIENT)
Dept: CARDIAC CATH/INVASIVE PROCEDURES | Age: 77
End: 2022-12-05

## 2022-12-06 ENCOUNTER — ANESTHESIA (OUTPATIENT)
Dept: CARDIAC CATH/INVASIVE PROCEDURES | Age: 77
End: 2022-12-06

## 2022-12-06 ENCOUNTER — HOSPITAL ENCOUNTER (INPATIENT)
Dept: CARDIAC CATH/INVASIVE PROCEDURES | Age: 77
LOS: 1 days | Discharge: HOME | DRG: 274 | End: 2022-12-07
Attending: INTERNAL MEDICINE | Admitting: INTERNAL MEDICINE
Payer: MEDICARE

## 2022-12-06 PROBLEM — Z95.818 PRESENCE OF WATCHMAN LEFT ATRIAL APPENDAGE CLOSURE DEVICE: Status: ACTIVE | Noted: 2022-12-06

## 2022-12-06 LAB
ABO/RH: NORMAL
ACTIVATED CLOTTING TIME: 318 SEC (ref 79–149)
ACTIVATED CLOTTING TIME: 369 SEC (ref 79–149)
ANTIBODY SCREEN: NEGATIVE
ARM BAND NUMBER: NORMAL
EGFR, POC: 35 ML/MIN/1.73M2
EXPIRATION DATE: NORMAL
GLUCOSE BLD-MCNC: 190 MG/DL (ref 74–100)
HCT VFR BLD CALC: 39.8 % (ref 40.7–50.3)
HEMOGLOBIN: 12.2 G/DL (ref 13–17)
PLATELET # BLD: 150 K/UL (ref 138–453)
POC BUN: 32 MG/DL (ref 8–26)
POC CHLORIDE: 104 MMOL/L (ref 98–107)
POC CREATININE: 1.94 MG/DL (ref 0.51–1.19)
POC HEMATOCRIT: 41 % (ref 41–53)
POC HEMOGLOBIN: 14 G/DL (ref 13.5–17.5)
POC POTASSIUM: 4.8 MMOL/L (ref 3.5–4.5)
POC SODIUM: 140 MMOL/L (ref 138–146)

## 2022-12-06 PROCEDURE — G0378 HOSPITAL OBSERVATION PER HR: HCPCS

## 2022-12-06 PROCEDURE — 85347 COAGULATION TIME ACTIVATED: CPT

## 2022-12-06 PROCEDURE — 2720000010 HC SURG SUPPLY STERILE

## 2022-12-06 PROCEDURE — 93312 ECHO TRANSESOPHAGEAL: CPT

## 2022-12-06 PROCEDURE — 82435 ASSAY OF BLOOD CHLORIDE: CPT

## 2022-12-06 PROCEDURE — 85018 HEMOGLOBIN: CPT

## 2022-12-06 PROCEDURE — C1894 INTRO/SHEATH, NON-LASER: HCPCS

## 2022-12-06 PROCEDURE — C1766 INTRO/SHEATH,STRBLE,NON-PEEL: HCPCS

## 2022-12-06 PROCEDURE — C1760 CLOSURE DEV, VASC: HCPCS

## 2022-12-06 PROCEDURE — 36415 COLL VENOUS BLD VENIPUNCTURE: CPT

## 2022-12-06 PROCEDURE — 3700000000 HC ANESTHESIA ATTENDED CARE

## 2022-12-06 PROCEDURE — 7100000000 HC PACU RECOVERY - FIRST 15 MIN

## 2022-12-06 PROCEDURE — 84132 ASSAY OF SERUM POTASSIUM: CPT

## 2022-12-06 PROCEDURE — 33340 PERQ CLSR TCAT L ATR APNDGE: CPT

## 2022-12-06 PROCEDURE — 85049 AUTOMATED PLATELET COUNT: CPT

## 2022-12-06 PROCEDURE — 82947 ASSAY GLUCOSE BLOOD QUANT: CPT

## 2022-12-06 PROCEDURE — 86900 BLOOD TYPING SEROLOGIC ABO: CPT

## 2022-12-06 PROCEDURE — 86901 BLOOD TYPING SEROLOGIC RH(D): CPT

## 2022-12-06 PROCEDURE — 85014 HEMATOCRIT: CPT

## 2022-12-06 PROCEDURE — 3700000001 HC ADD 15 MINUTES (ANESTHESIA)

## 2022-12-06 PROCEDURE — 2709999900 HC NON-CHARGEABLE SUPPLY

## 2022-12-06 PROCEDURE — 84295 ASSAY OF SERUM SODIUM: CPT

## 2022-12-06 PROCEDURE — 2500000003 HC RX 250 WO HCPCS: Performed by: NURSE ANESTHETIST, CERTIFIED REGISTERED

## 2022-12-06 PROCEDURE — 2580000003 HC RX 258: Performed by: NURSE ANESTHETIST, CERTIFIED REGISTERED

## 2022-12-06 PROCEDURE — 93325 DOPPLER ECHO COLOR FLOW MAPG: CPT

## 2022-12-06 PROCEDURE — B246ZZ4 ULTRASONOGRAPHY OF RIGHT AND LEFT HEART, TRANSESOPHAGEAL: ICD-10-PCS | Performed by: INTERNAL MEDICINE

## 2022-12-06 PROCEDURE — 82565 ASSAY OF CREATININE: CPT

## 2022-12-06 PROCEDURE — C1889 IMPLANT/INSERT DEVICE, NOC: HCPCS

## 2022-12-06 PROCEDURE — 86850 RBC ANTIBODY SCREEN: CPT

## 2022-12-06 PROCEDURE — 93308 TTE F-UP OR LMTD: CPT

## 2022-12-06 PROCEDURE — 6360000002 HC RX W HCPCS: Performed by: NURSE ANESTHETIST, CERTIFIED REGISTERED

## 2022-12-06 PROCEDURE — G0379 DIRECT REFER HOSPITAL OBSERV: HCPCS

## 2022-12-06 PROCEDURE — 6370000000 HC RX 637 (ALT 250 FOR IP): Performed by: STUDENT IN AN ORGANIZED HEALTH CARE EDUCATION/TRAINING PROGRAM

## 2022-12-06 PROCEDURE — 02L73DK OCCLUSION OF LEFT ATRIAL APPENDAGE WITH INTRALUMINAL DEVICE, PERCUTANEOUS APPROACH: ICD-10-PCS | Performed by: INTERNAL MEDICINE

## 2022-12-06 PROCEDURE — 84520 ASSAY OF UREA NITROGEN: CPT

## 2022-12-06 PROCEDURE — 7100000001 HC PACU RECOVERY - ADDTL 15 MIN

## 2022-12-06 RX ORDER — SODIUM CHLORIDE 9 MG/ML
INJECTION, SOLUTION INTRAVENOUS CONTINUOUS
Status: DISCONTINUED | OUTPATIENT
Start: 2022-12-06 | End: 2022-12-06

## 2022-12-06 RX ORDER — ENOXAPARIN SODIUM 100 MG/ML
40 INJECTION SUBCUTANEOUS DAILY
Status: DISCONTINUED | OUTPATIENT
Start: 2022-12-06 | End: 2022-12-07 | Stop reason: HOSPADM

## 2022-12-06 RX ORDER — PROPOFOL 10 MG/ML
INJECTION, EMULSION INTRAVENOUS PRN
Status: DISCONTINUED | OUTPATIENT
Start: 2022-12-06 | End: 2022-12-06 | Stop reason: SDUPTHER

## 2022-12-06 RX ORDER — FENTANYL CITRATE 50 UG/ML
25 INJECTION, SOLUTION INTRAMUSCULAR; INTRAVENOUS EVERY 5 MIN PRN
Status: DISCONTINUED | OUTPATIENT
Start: 2022-12-06 | End: 2022-12-07 | Stop reason: HOSPADM

## 2022-12-06 RX ORDER — PROPOFOL 10 MG/ML
INJECTION, EMULSION INTRAVENOUS
Status: DISPENSED
Start: 2022-12-06 | End: 2022-12-07

## 2022-12-06 RX ORDER — SODIUM CHLORIDE 9 MG/ML
INJECTION, SOLUTION INTRAVENOUS PRN
Status: DISCONTINUED | OUTPATIENT
Start: 2022-12-06 | End: 2022-12-06

## 2022-12-06 RX ORDER — ROCURONIUM BROMIDE 10 MG/ML
INJECTION, SOLUTION INTRAVENOUS PRN
Status: DISCONTINUED | OUTPATIENT
Start: 2022-12-06 | End: 2022-12-06 | Stop reason: SDUPTHER

## 2022-12-06 RX ORDER — ACETAMINOPHEN 325 MG/1
650 TABLET ORAL
Status: DISCONTINUED | OUTPATIENT
Start: 2022-12-06 | End: 2022-12-07 | Stop reason: HOSPADM

## 2022-12-06 RX ORDER — ONDANSETRON 2 MG/ML
INJECTION INTRAMUSCULAR; INTRAVENOUS PRN
Status: DISCONTINUED | OUTPATIENT
Start: 2022-12-06 | End: 2022-12-06 | Stop reason: SDUPTHER

## 2022-12-06 RX ORDER — ZOLPIDEM TARTRATE 5 MG/1
10 TABLET ORAL NIGHTLY PRN
Status: DISCONTINUED | OUTPATIENT
Start: 2022-12-06 | End: 2022-12-07 | Stop reason: HOSPADM

## 2022-12-06 RX ORDER — ONDANSETRON 4 MG/1
4 TABLET, ORALLY DISINTEGRATING ORAL EVERY 8 HOURS PRN
Status: DISCONTINUED | OUTPATIENT
Start: 2022-12-06 | End: 2022-12-07 | Stop reason: HOSPADM

## 2022-12-06 RX ORDER — SODIUM CHLORIDE 0.9 % (FLUSH) 0.9 %
5-40 SYRINGE (ML) INJECTION PRN
Status: DISCONTINUED | OUTPATIENT
Start: 2022-12-06 | End: 2022-12-06

## 2022-12-06 RX ORDER — SODIUM CHLORIDE 9 MG/ML
INJECTION, SOLUTION INTRAVENOUS CONTINUOUS
Status: DISCONTINUED | OUTPATIENT
Start: 2022-12-06 | End: 2022-12-07 | Stop reason: HOSPADM

## 2022-12-06 RX ORDER — SODIUM CHLORIDE 9 MG/ML
INJECTION, SOLUTION INTRAVENOUS PRN
Status: DISCONTINUED | OUTPATIENT
Start: 2022-12-06 | End: 2022-12-07 | Stop reason: HOSPADM

## 2022-12-06 RX ORDER — POLYETHYLENE GLYCOL 3350 17 G/17G
17 POWDER, FOR SOLUTION ORAL DAILY PRN
Status: DISCONTINUED | OUTPATIENT
Start: 2022-12-06 | End: 2022-12-07 | Stop reason: HOSPADM

## 2022-12-06 RX ORDER — SODIUM CHLORIDE 0.9 % (FLUSH) 0.9 %
5-40 SYRINGE (ML) INJECTION PRN
Status: DISCONTINUED | OUTPATIENT
Start: 2022-12-06 | End: 2022-12-07 | Stop reason: HOSPADM

## 2022-12-06 RX ORDER — HEPARIN SODIUM 1000 [USP'U]/ML
INJECTION, SOLUTION INTRAVENOUS; SUBCUTANEOUS PRN
Status: DISCONTINUED | OUTPATIENT
Start: 2022-12-06 | End: 2022-12-06 | Stop reason: SDUPTHER

## 2022-12-06 RX ORDER — HYDROCODONE BITARTRATE AND ACETAMINOPHEN 5; 325 MG/1; MG/1
1 TABLET ORAL EVERY 8 HOURS PRN
Status: DISCONTINUED | OUTPATIENT
Start: 2022-12-06 | End: 2022-12-07 | Stop reason: HOSPADM

## 2022-12-06 RX ORDER — LIDOCAINE HYDROCHLORIDE 10 MG/ML
INJECTION, SOLUTION INFILTRATION; PERINEURAL
Status: DISPENSED
Start: 2022-12-06 | End: 2022-12-07

## 2022-12-06 RX ORDER — ACETAMINOPHEN 650 MG/1
650 SUPPOSITORY RECTAL EVERY 6 HOURS PRN
Status: DISCONTINUED | OUTPATIENT
Start: 2022-12-06 | End: 2022-12-07 | Stop reason: HOSPADM

## 2022-12-06 RX ORDER — FENTANYL CITRATE 50 UG/ML
INJECTION, SOLUTION INTRAMUSCULAR; INTRAVENOUS PRN
Status: DISCONTINUED | OUTPATIENT
Start: 2022-12-06 | End: 2022-12-06 | Stop reason: SDUPTHER

## 2022-12-06 RX ORDER — SODIUM CHLORIDE 0.9 % (FLUSH) 0.9 %
5-40 SYRINGE (ML) INJECTION EVERY 12 HOURS SCHEDULED
Status: DISCONTINUED | OUTPATIENT
Start: 2022-12-06 | End: 2022-12-07 | Stop reason: HOSPADM

## 2022-12-06 RX ORDER — SODIUM CHLORIDE 0.9 % (FLUSH) 0.9 %
5-40 SYRINGE (ML) INJECTION EVERY 12 HOURS SCHEDULED
Status: DISCONTINUED | OUTPATIENT
Start: 2022-12-06 | End: 2022-12-07 | Stop reason: SDUPTHER

## 2022-12-06 RX ORDER — LIDOCAINE HYDROCHLORIDE 10 MG/ML
INJECTION, SOLUTION EPIDURAL; INFILTRATION; INTRACAUDAL; PERINEURAL PRN
Status: DISCONTINUED | OUTPATIENT
Start: 2022-12-06 | End: 2022-12-06 | Stop reason: SDUPTHER

## 2022-12-06 RX ORDER — ASPIRIN 81 MG/1
81 TABLET, CHEWABLE ORAL DAILY
Status: DISCONTINUED | OUTPATIENT
Start: 2022-12-06 | End: 2022-12-07 | Stop reason: HOSPADM

## 2022-12-06 RX ORDER — ACETAMINOPHEN 325 MG/1
650 TABLET ORAL EVERY 4 HOURS PRN
Status: DISCONTINUED | OUTPATIENT
Start: 2022-12-06 | End: 2022-12-06

## 2022-12-06 RX ORDER — NEOSTIGMINE METHYLSULFATE 5 MG/5 ML
SYRINGE (ML) INTRAVENOUS PRN
Status: DISCONTINUED | OUTPATIENT
Start: 2022-12-06 | End: 2022-12-06 | Stop reason: SDUPTHER

## 2022-12-06 RX ORDER — SODIUM CHLORIDE 9 MG/ML
INJECTION, SOLUTION INTRAVENOUS CONTINUOUS PRN
Status: DISCONTINUED | OUTPATIENT
Start: 2022-12-06 | End: 2022-12-06 | Stop reason: SDUPTHER

## 2022-12-06 RX ORDER — SODIUM CHLORIDE 9 MG/ML
INJECTION, SOLUTION INTRAVENOUS PRN
Status: DISCONTINUED | OUTPATIENT
Start: 2022-12-06 | End: 2022-12-07 | Stop reason: SDUPTHER

## 2022-12-06 RX ORDER — SODIUM CHLORIDE, SODIUM LACTATE, POTASSIUM CHLORIDE, CALCIUM CHLORIDE 600; 310; 30; 20 MG/100ML; MG/100ML; MG/100ML; MG/100ML
INJECTION, SOLUTION INTRAVENOUS CONTINUOUS PRN
Status: DISCONTINUED | OUTPATIENT
Start: 2022-12-06 | End: 2022-12-06 | Stop reason: SDUPTHER

## 2022-12-06 RX ORDER — ACETAMINOPHEN 325 MG/1
650 TABLET ORAL EVERY 6 HOURS PRN
Status: DISCONTINUED | OUTPATIENT
Start: 2022-12-06 | End: 2022-12-07 | Stop reason: HOSPADM

## 2022-12-06 RX ORDER — SODIUM CHLORIDE 0.9 % (FLUSH) 0.9 %
5-40 SYRINGE (ML) INJECTION PRN
Status: DISCONTINUED | OUTPATIENT
Start: 2022-12-06 | End: 2022-12-07 | Stop reason: SDUPTHER

## 2022-12-06 RX ORDER — ONDANSETRON 2 MG/ML
4 INJECTION INTRAMUSCULAR; INTRAVENOUS EVERY 6 HOURS PRN
Status: DISCONTINUED | OUTPATIENT
Start: 2022-12-06 | End: 2022-12-07 | Stop reason: HOSPADM

## 2022-12-06 RX ORDER — SODIUM CHLORIDE 0.9 % (FLUSH) 0.9 %
5-40 SYRINGE (ML) INJECTION EVERY 12 HOURS SCHEDULED
Status: DISCONTINUED | OUTPATIENT
Start: 2022-12-06 | End: 2022-12-06

## 2022-12-06 RX ORDER — PHENYLEPHRINE HCL IN 0.9% NACL 0.5 MG/5ML
SYRINGE (ML) INTRAVENOUS PRN
Status: DISCONTINUED | OUTPATIENT
Start: 2022-12-06 | End: 2022-12-06 | Stop reason: SDUPTHER

## 2022-12-06 RX ORDER — DEXAMETHASONE SODIUM PHOSPHATE 10 MG/ML
INJECTION INTRAMUSCULAR; INTRAVENOUS PRN
Status: DISCONTINUED | OUTPATIENT
Start: 2022-12-06 | End: 2022-12-06 | Stop reason: SDUPTHER

## 2022-12-06 RX ORDER — GLYCOPYRROLATE 0.2 MG/ML
INJECTION INTRAMUSCULAR; INTRAVENOUS PRN
Status: DISCONTINUED | OUTPATIENT
Start: 2022-12-06 | End: 2022-12-06 | Stop reason: SDUPTHER

## 2022-12-06 RX ADMIN — HEPARIN SODIUM 3000 UNITS: 1000 INJECTION INTRAVENOUS; SUBCUTANEOUS at 14:24

## 2022-12-06 RX ADMIN — SODIUM CHLORIDE: 900 INJECTION INTRAVENOUS at 13:29

## 2022-12-06 RX ADMIN — SODIUM CHLORIDE, SODIUM LACTATE, POTASSIUM CHLORIDE, CALCIUM CHLORIDE: 600; 310; 30; 20 INJECTION, SOLUTION INTRAVENOUS at 14:15

## 2022-12-06 RX ADMIN — FENTANYL CITRATE 50 MCG: 50 INJECTION, SOLUTION INTRAMUSCULAR; INTRAVENOUS at 13:49

## 2022-12-06 RX ADMIN — SODIUM CHLORIDE: 9 INJECTION, SOLUTION INTRAVENOUS at 11:27

## 2022-12-06 RX ADMIN — HYDROCODONE BITARTRATE AND ACETAMINOPHEN 1 TABLET: 5; 325 TABLET ORAL at 20:15

## 2022-12-06 RX ADMIN — HEPARIN SODIUM 5000 UNITS: 1000 INJECTION INTRAVENOUS; SUBCUTANEOUS at 14:17

## 2022-12-06 RX ADMIN — Medication 100 MCG: at 14:06

## 2022-12-06 RX ADMIN — PROPOFOL 150 MG: 10 INJECTION, EMULSION INTRAVENOUS at 13:49

## 2022-12-06 RX ADMIN — SODIUM CHLORIDE, SODIUM LACTATE, POTASSIUM CHLORIDE, CALCIUM CHLORIDE: 600; 310; 30; 20 INJECTION, SOLUTION INTRAVENOUS at 13:29

## 2022-12-06 RX ADMIN — Medication 100 MCG: at 13:56

## 2022-12-06 RX ADMIN — ZOLPIDEM TARTRATE 10 MG: 5 TABLET ORAL at 23:32

## 2022-12-06 RX ADMIN — ONDANSETRON 4 MG: 2 INJECTION INTRAMUSCULAR; INTRAVENOUS at 15:13

## 2022-12-06 RX ADMIN — Medication 100 MCG: at 14:12

## 2022-12-06 RX ADMIN — Medication 100 MCG: at 14:08

## 2022-12-06 RX ADMIN — DEXAMETHASONE SODIUM PHOSPHATE 4 MG: 10 INJECTION INTRAMUSCULAR; INTRAVENOUS at 14:34

## 2022-12-06 RX ADMIN — Medication 100 MCG: at 14:00

## 2022-12-06 RX ADMIN — Medication 2 MG: at 15:16

## 2022-12-06 RX ADMIN — Medication 100 MCG: at 14:03

## 2022-12-06 RX ADMIN — Medication 100 MCG: at 13:58

## 2022-12-06 RX ADMIN — GLYCOPYRROLATE 0.4 MG: 0.2 INJECTION INTRAMUSCULAR; INTRAVENOUS at 15:16

## 2022-12-06 RX ADMIN — LIDOCAINE HYDROCHLORIDE 50 MG: 10 INJECTION, SOLUTION EPIDURAL; INFILTRATION; INTRACAUDAL; PERINEURAL at 13:49

## 2022-12-06 RX ADMIN — PHENYLEPHRINE HYDROCHLORIDE 40 MCG/MIN: 10 INJECTION INTRAVENOUS at 14:07

## 2022-12-06 RX ADMIN — ROCURONIUM BROMIDE 40 MG: 10 INJECTION, SOLUTION INTRAVENOUS at 13:49

## 2022-12-06 ASSESSMENT — PAIN DESCRIPTION - ORIENTATION: ORIENTATION: LEFT;RIGHT

## 2022-12-06 ASSESSMENT — PAIN DESCRIPTION - DESCRIPTORS: DESCRIPTORS: ACHING;SHARP

## 2022-12-06 ASSESSMENT — PAIN DESCRIPTION - LOCATION: LOCATION: GROIN;BACK

## 2022-12-06 ASSESSMENT — PAIN SCALES - GENERAL: PAINLEVEL_OUTOF10: 7

## 2022-12-06 NOTE — PROCEDURES
[facility]  3D Interventional /Structural CATHY   Op Note for CATHY guidance during Watchman Deployment       12/06/22   Mervat Renae  [id number]  1945    Primary/Ordering Cardiologist: Dr. Narciso Morgan  Indication: CATHY guidance during Watchman    Operators:  Primary: Luis Patterson DO    Patient seen and examined. History and Physical reviewed. Labs reviewed. After informed consent was obtained with explanation of the risks and benefits, the patient was brought to [facility] Cath lab. All sedation was administered via the Anesthesia department. Intubation done with single effort. CATHY Pre findings:    Structures:  Normal LV function with EF 40%  Normal wall thickness  No significant pericardial effusion  Left Atrium/LEONARDA- No thrombus  IntraAtrial Septum- Aneurysmal with bowing from   Significant Valve Diseases: Mild AI. LEONARDA Morphology- Broccoli    All CATHY LEONARDA Sizes: all sizes were foreshortened, Flouro measured LEONARDA orifice at 31 mm. Size 35 mm used. Post Deployment Watchman Compression Size :  0 degree- size (mm): 27 - Compression: 24%  45 degree- size (mm): 30 - Compression: 15%  90 degree- size (mm): 28.5 - Compression: 19%  135 degree- size (mm): 28.5 - Compression: 19%    Post Deployment Leak Check:  0 degree- none  45 degree- none  90 degree-  none  135 degree- none    Post Deployment Tug Test: Passed     Post Deployment Effusion Check: No effusion    Post Deployment Septum Check: Iatrogenic Small ASD noted with L- R shunt    Watchman well seated and in good position confirmed on multiple views/angles. Conclusion:   1. Successful Interventional / Structural CATHY for Watchman Deployment size 35 mm used. 2. See above for full details. 3. No effusion. All of the above was relayed in real time to the Structural Heart Team / , Pre-op, Intra-op, and Post-op. Cholo Wadsworth DO, [facility], [facility] [address], [address], [address] [facility]  [facility] [place]  [phone]

## 2022-12-06 NOTE — PROGRESS NOTES
Patients blood pressure in the 70\"s bolus initiated patient in [place]. Dr. Mandeep Ying at bedside holding pressure to left groin.

## 2022-12-06 NOTE — PROGRESS NOTES
Received post procedure to [facility] to room 7. Assessment obtained. Post procedure pathway initiated. Right groin  site soft , M stitch intact. , dressing dry and intact. Left groin with hematoma noted. pressure held. Patient without complaints. Head of bed flat with bilateral  leg straight.

## 2022-12-06 NOTE — OP NOTE
[facility]    Watchman Procedure    Date:   12/6/2022  Patient name: Brian Fishman  Date of admission:  12/6/2022 10:29 AM  MRN:   [id number]  YOB: 1945    Operators:  Primary:       [] Raisa Batista M.D. []          Pre Procedure Conscious Sedation Data:    ASA Class:    [] I [x] II [] III [] IV    Mallampati Class:  [] I [x] II [] III [] IV    Procedures performed:     [x] Percutaneous transcatheter closure of the left atrial appendage (LEONARDA) with endocardial implant (Watchman)    [x] Trans septal Puncture    [x] LEONARDA Angiogram    [x] CATHY ( Dr: Ara Poe)        Indication of procedure:      [x] Atrial Fibrillation  [] CVA  [x] High risk for bleeding  [x] Bleeding episodes  [] Risk of fall is high with needs for [facility].  [] Patient refusal for Anticoagulation. Patient has been seen by General cardiology and shared decision making has been made for pursuing LEONARDA closure. Patient here for LEONARDA closure with Watchman device. Details of procedure: The patient was brought to LAB in stable condition. The patient was in a fasting and non-sedated state. The risks, benefits and alternatives of the procedure were discussed with the patient. The risks including, but not limited to, the risks of vascular injury, bleeding, infection, device malfunction, lead dislodgement, radiation exposure, injury to cardiac and surrounding structures (including pneumothorax), stroke, myocardial infarction and death were discussed in detail. The patient opted to proceed with the device implantation. Written informed consent was signed and placed in the chart. Prophylactic antibiotic was given. The patient was prepped and draped in a sterile fashion. A timeout protocol was completed to identify the patient and the procedure being performed.  Patient underwent general anesthesia by anesthesiology team.       Transesophageal echocardiography was performed and measurements of left atrial appendage, including ostium size and depth were obtained for selection of appropriate watchman device in different positions (9,29,93,812)     CATHY Angle About 0 About 45 About 90 About 135   LEONARDA Ostial Width 26.8 30 28.5 28.5   LEONARDA Depth             Decision was made to use a size 35 mm Watchman device based on CATHY measurement and Watchman guidance protocol: Both groins were prepped in a sterile fashion. We gained access in the  [x] Right  [] Left femoral veins. We gained access in the  [] Right  [x] Left femoral artery for pigtail guidance. Right femoral access was obtained using modified using modified seldinger technique. 12 F sheath was placed Patient received a bolus of heparin prior transseptal puncture. Transseptal punctures through intact septum were done using CATHY guidance as well as pressure monitoring using Mike trans-septal system     CATHY and Fluoroscopy  were used to confirm in JOSE ANTONIO and MONTALVO projections. We placed  Mike  sheath dilator  inside the left atrium. A long wire was placed into the left superior pulmonary vein. Then the watchman access sheath was advanced and carefully paced at the ostium  of PV and positioned into left atrium . Then a pigtail catheter was inserted into the access sheath and was placed inside the left atrial appendage. Angiography of LEONARDA was performed. Pigtail catheter was removed. Then Watchman delivery sheath was inserted into the access sheath. Using fluoroscopy and live CATHY the anterior lobe of the appendage was located and delivery sheath was advanced into this lobe. Then device was implanted into the appendage under fluoroscopy and live CTAHY imaging. Device positioning was confirmed and compression ratio was confirmed using CATHY in different angles ,     ATug test was done multiple times to insure device stability   CATHY guidance and 3D guidance revealed device to be well seated with excellent occlusion of atrial appendage using contrast injection  .       After deployment a tug test was done and stability of device was checked. Position of device was checked. Measurement of device showed appropriate compression of the device. Using color Doppler, no leak around the was noted. PASS criteria for releasing of device were met and device was released. A figure of 8 was used to achieve hemostasis. Patient was extubated and transferred to the floor. No immediate complications noted. Pass Criteria met. [x] Position   [x] [place]   [x] Size   [x] Seal    Assessment and Summary:    Successful deployment of left atrial appendage occlusion device with no complication    WATCHMAN device used 35 mm size     Angiogram revealed good seal and no thrombus     CATHY confirmed placement and seal    EBL Less than 20 mL  No complications        Plan:     The patient will be admitted and have usual post care  Start anticoagulation  for at least next 6 weeks  Start ASA  Echocardiogram tomorrow   Patient is participating in the left atrial appendage occlusion/closure registry. [address] is approved by the [employer] for Medicare and Medicaid Services (CMS) to meet the registry requirements outlined in the national coverage decisions for Percutaneous Left Atrial Appendage Closure  FU CATHY in 6-8 weeks to monitor device stability and decide about AC as recommended.

## 2022-12-06 NOTE — H&P
[facility]    Watchman Procedure    Date:   12/6/2022  Patient name: Azra White  Date of admission:  12/6/2022 10:29 AM  MRN:   [id number]  YOB: 1945    Operators:  Primary:       [x] Alla Atkins M.D. Pre Procedure Conscious Sedation Data:    ASA Class:    [] I [x] II [] III [] IV    Mallampati Class:  [] I [x] II [] III [] IV    Patient is documented atrial fibrillation   [x] Chronic  [] Paroxysmal    Treated with anticoagulation but due to multiple reasons considered now not the best candidate to continue and advised alternative measures. Some of the reasons are:    [] CVA  [] High risk for bleeding  [] Recurrent Bleeding episodes  [] Risk of fall is high with needs for [facility].  [] Patient refusal for Anticoagulation. Patient has been seen by General cardiology and shared decision making has been made for pursuing LEONARDA closure. Patient here for LEONARDA closure with Watchman device. His medical problems includes:    Transesophageal echocardiography was performed and measurements of left atrial appendage, including ostium size and depth were obtained for selection of appropriate watchman device in different positions (0,75,24,878)     CATHY findings:  LA:      Normal  LEONARDA:    No thrombus or clot. LEONARDA measurements: Was made and documented but further LEONARDA angiogram should be done due to large sizr. RA:      Normal  RV:      Normal  LV:       Normal  Estimated LVEF: 40-45%  Aorta:               Mild atheromatous disease arch  Pericardium:    No pericardial effusion     Valves:  Mitral Valve: Structurally normal. mild regurgitation is identified. Aortic Valve: The aortic valve is trileaflet and opens adequately. mild regurgitiation is identified. Tricuspid valve: Structurally normal. No regurgitation is identified.   Pulmonary valve: Normal. No significant regurgitation       HISTORY OF PRESENT ILLNESS:      Obtained for patient and records, found with some contr indication for anticoagulation. Other medical issues:    1. Previous PTCA - stent of the RCA and LAD in 2003 and 2005. 2. Repeat heart catheterization requiring repeat stenting of the LAD and RCA in 11/2007. 3. Hypertension. 4. Hyperlipidemia. 5. History of DVT requiring previous Coumadin treatment. 6. Type 2 diabetes mellitus. 7. Ischemic cardiomyopathy with most recent 2D echo from 1/20/2017 showing LVEF 35-40% with inferobasal akinesis, with moderate mitral regurgitation and mild aortic insufficiency. There was grade I diastolic dysfunction. 8. Cardiac cath from 1/18/17 showing three vessel CAD with patent stents in the LAD, OM1 and RCA, with new stenotic lesions in the mid and distal RCA, requiring PTCA and drug eluting stent. 9. Implantation of prophylactic ICD originally on 7/11/16 with Medtronic Evera XT VR, along with Medtronic 3816 lead. 10. 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 11. Subsequent radiofrequency ablation of multiple ventricular tachycardias at [address] [place] [address] (Dr. Saji Dorantes) with 7 different morphologies of ventricular tachycardia with a clinical tachycardia easily ablated. 12. Suspected vasovagal syndrome and sick sinus syndrome with resting sinus rates in the 50s most of the time. 13. History of tinnitus and peripheral neuropathy with extensive evaluation in 2017. Amiodarone stopped late 2017 with no improvement in tinnitus. 14. Echocardiogram  February 2019 suggesting that the patient has EF about 35% with PA pressure 44 mm Hg. 15. Repeat cardiac catheterization on 2/3/20 showed severe disease in the LAD and LCX; CV Surgery felt pt was too high risk for CABG  16. PCI (2/12/20) including PTCA and 3 PO to proximal, mid and distal LAD, and with 2 PO to mid and distal LCX.   17. ECHO on 02/03/2020 showed technically difficult study due to patient laying supine post catheterization. Moderate concentric LVH. LV mildly enlarged. Mild to moderately reduced LV systolic function with EF 35%. Grade 2 LV diastolic dysfunction. Mild TR.  18. Gout diagnosed in Nov 2020. 19. Upper GI bleeding with PUD in Dec 2020. 20. Device interrogation suggested episodes of atrial flutter/fib. 4/2021. Started Eliquis. 21. ECHO 8/23/2021. LVEF 30-35% G1DD. Mild to Mod MR. Mild TR. RVSP 38 mmHg. 22. Stress test 11/2021 Apical lateral ischemia, L inferior infarct EF 33%, Global hypokinesis   23. Cardiac cath 1/10/22 Patent RCA/LCX, Totally occluded Mid LAD stent. Very small vessel size and L-L collateral.  Severe LV dysfunction           Past Medical History:   has a past medical history of Arthritis, CAD (coronary artery disease), CHF (congestive heart failure) ([facility] [address] Clinical trial participant at discharge, [facility] Former smoker, H/O ventricular tachycardia, History of blood transfusion, Hyperlipidemia, and Hypertension. Past Surgical History:   has a past surgical history that includes Cardiac catheterization; Coronary angioplasty; Cardiac defibrillator placement (07/11/2016); Coronary angioplasty (02/12/2020); Cardiac defibrillator placement (06/01/2020); Upper gastrointestinal endoscopy (N/A, 12/27/2020); Colonoscopy; transesophageal echocardiogram (11/29/2022); and other surgical history (12/06/2022). Home Medications:    Prior to Admission medications    Medication Sig Start Date End Date Taking?  Authorizing Provider   levothyroxine (SYNTHROID) 75 MCG tablet Take 75 mcg by mouth Daily    Historical Provider, MD   cholestyramine light 4 g packet Take 4 g by mouth 2 times daily    Historical Provider, MD   escitalopram (LEXAPRO) 10 MG tablet Take 10 mg by mouth daily    Historical Provider, MD   Vitamin D, Cholecalciferol, 25 MCG (1000 UT) CAPS Take by mouth Takes on Monday    Historical Provider, MD   glimepiride (AMARYL) 1 MG tablet Take 2 mg by mouth every evening Historical Provider, MD   metFORMIN (GLUCOPHAGE) 1000 MG tablet Take 1,000 mg by mouth Daily with supper    Historical Provider, MD   allopurinol (ZYLOPRIM) 300 MG tablet Take 300 mg by mouth daily    Historical Provider, MD   meclizine (ANTIVERT) 25 MG tablet Take 25 mg by mouth 3 times daily as needed  Patient not taking: No sig reported    Historical Provider, MD   XARELTO 20 MG TABS tablet  10/8/21   Historical Provider, MD   potassium chloride (KLOR-CON M) 20 MEQ extended release tablet Take 1 tablet by mouth daily 12/30/20   Cynthia Fritz MD   pantoprazole (PROTONIX) 40 MG tablet Take 1 tablet by mouth 2 times daily (before meals) 12/30/20 2/28/21  Mart Marin MD   pantoprazole (PROTONIX) 40 MG tablet Take 1 tablet by mouth every morning (before breakfast) 12/30/20   Mart Marin MD   bumetanide (BUMEX) 2 MG tablet Take 0.5 tablets by mouth 2 times daily 12/30/20   Cynthia Fritz MD   ferrous sulfate (IRON 325) 325 (65 Fe) MG tablet Take 1 tablet by mouth daily (with breakfast) 12/30/20   Checo Diehl MD   spironolactone (ALDACTONE) 25 MG tablet Take 25 mg by mouth daily Take 1/2 (one-half) tablet by mouth daily    Historical Provider, MD   escitalopram (LEXAPRO) 20 MG tablet Take 20 mg by mouth daily Take one tablet by mouth daily     Historical Provider, MD   gabapentin (NEURONTIN) 600 MG tablet Take 600 mg by mouth 3 times daily. Take 2 tablets by mouth three times daily    Historical Provider, MD   sacubitril-valsartan (ENTRESTO)  MG per tablet Take 1 tablet by mouth 2 times daily     Historical Provider, MD   zolpidem (AMBIEN) 10 MG tablet Take by mouth nightly as needed for Sleep. Historical Provider, MD   nitroGLYCERIN (NITROSTAT) 0.4 MG SL tablet Place 1 tablet under the tongue every 5 minutes as needed for Chest pain up to max of 3 total doses.  If no relief after 1 dose, call 911. 3/28/17   SHANDRA Orozco - CNP   atorvastatin (LIPITOR) 80 MG tablet Take 1 tablet by mouth nightly 1/23/17   Josh Briannolan Johnny,    Omega-3 Fatty Acids (FISH OIL PO) Take 1 tablet by mouth 2 times daily    Historical Provider, MD   folic acid (FOLVITE) 1 MG tablet Take 1 mg by mouth daily    Historical Provider, MD   clopidogrel (PLAVIX) 75 MG tablet Take 75 mg by mouth daily    Historical Provider, MD   HYDROcodone-acetaminophen (NORCO) 7.5-325 MG per tablet Take 1 tablet by mouth every 6 hours as needed for Pain. Historical Provider, MD       Allergies:  Patient has no known allergies. Social History:   reports that he quit smoking about 54 years ago. His smoking use included cigarettes. He has a 7.00 pack-year smoking history. He has never used smokeless tobacco. He reports that he does not currently use alcohol after a past usage of about 1.0 standard drink per week. He reports that he does not use drugs. Family History:   Positive for early CAD    REVIEW OF SYSTEMS:    Constitutional: there has been no unanticipated weight loss. There's been No change in energy level, No change in activity level. Eyes: No visual changes or diplopia. No scleral icterus. ENT: No Headaches, hearing loss or vertigo. No mouth sores or sore throat. Cardiovascular: Atrial fibrillation, CAD and stents  Respiratory: No previous reported problems  Gastrointestinal: No abdominal pain, appetite loss, blood in stools. No change in bowel or bladder habits. Genitourinary: No dysuria, trouble voiding, or hematuria. Musculoskeletal:  No gait disturbance, No weakness or joint complaints. Integumentary: No rash or pruritis. Neurological: No headache, diplopia, change in muscle strength, numbness or tingling. No change in gait, balance, coordination, mood, affect, memory, mentation, behavior. Psychiatric: No anxiety, or depression. Endocrine: No temperature intolerance. No excessive thirst, fluid intake, or urination. No tremor.   Hematologic/Lymphatic: No abnormal bruising or bleeding, blood clots or swollen lymph nodes. Allergic/Immunologic: No nasal congestion or hives. PHYSICAL EXAM:    Physical Examination:    BP (!) 102/59   Pulse 61   Temp 97.8 °F (36.6 °C) (Oral)   Resp 13   Ht 5' 8\" (1.727 m)   Wt 214 lb (97.1 kg)   SpO2 95%   BMI 32.54 kg/m²    Constitutional and General Appearance: alert, cooperative, no distress and appears stated age  HEENT: PERRL, no cervical lymphadenopathy. No masses palpable. Normal oral mucosa  Respiratory:  Normal excursion and expansion without use of accessory muscles  Resp Auscultation: Good respiratory effort. No for increased work of breathing. On auscultation: clear to auscultation bilaterally  Cardiovascular: The apical impulse is not displaced  Heart tones are crisp and normal. regular S1 and S2. Murmurs: SM 1/6 apical area  Jugular venous pulsation Normal  The carotid upstroke is normal in amplitude and contour without delay or bruit  Peripheral pulses are symmetrical and full   Abdomen:  No masses or tenderness  Bowel sounds present  Extremities:   No Cyanosis or Clubbing   Lower extremity edema: No   Skin: Warm and dry  Neurological:  Alert and oriented. Moves all extremities well  No abnormalities of mood, affect, memory, mentation, or behavior are noted    DATA:    Diagnostics:      EKG: Pacing rhythm    IMPRESSION:    Atrial fibrillation- Chronic  Recurrent GI bleeding  CAD with stents  HTN  HLP  ICD in place    Patient Active Problem List   Diagnosis    AICD discharge    Essential hypertension    Hyperlipidemia    Type 2 diabetes mellitus without complication, without long-term current use of insulin (HCC)    CAD (coronary artery disease)    Hypophosphatemia    V tach    H/O ventricular tachycardia    S/P angioplasty with stent    S/P ICD (internal cardiac defibrillator) procedure    GI bleed    Depression       RECOMMENDATIONS:  Watchman Procedure. Discussed with patient and nursing.     Electronically signed by Lovely Raines Kristen Baird MD on 12/6/2022 at 1:18 PM.  [facility]

## 2022-12-06 NOTE — PROGRESS NOTES
Patients blood pressure in 70's, Dr Fauzia Cochran at bedside,order received for a bolus and to put patient in trendelenburg .

## 2022-12-06 NOTE — ANESTHESIA PRE PROCEDURE
Department of Anesthesiology  Preprocedure Note       Name:  Vicenta Varner   Age:  67 y.o.  :  1945                                          MRN:  [id number]         Date:  2022      Surgeon: * Surgery not found *    Procedure:     Medications prior to admission:   Prior to Admission medications    Medication Sig Start Date End Date Taking?  Authorizing Provider   levothyroxine (SYNTHROID) 75 MCG tablet Take 75 mcg by mouth Daily    Historical Provider, MD   cholestyramine light 4 g packet Take 4 g by mouth 2 times daily    Historical Provider, MD   escitalopram (LEXAPRO) 10 MG tablet Take 10 mg by mouth daily    Historical Provider, MD   Vitamin D, Cholecalciferol, 25 MCG (1000 UT) CAPS Take by mouth Takes on Monday    Historical Provider, MD   glimepiride (AMARYL) 1 MG tablet Take 2 mg by mouth every evening    Historical Provider, MD   metFORMIN (GLUCOPHAGE) 1000 MG tablet Take 1,000 mg by mouth Daily with supper    Historical Provider, MD   allopurinol (ZYLOPRIM) 300 MG tablet Take 300 mg by mouth daily    Historical Provider, MD   meclizine (ANTIVERT) 25 MG tablet Take 25 mg by mouth 3 times daily as needed  Patient not taking: No sig reported    Historical Provider, MD   XARELTO 20 MG TABS tablet  10/8/21   Historical Provider, MD   potassium chloride (KLOR-CON M) 20 MEQ extended release tablet Take 1 tablet by mouth daily 20   Mart Trimble MD   pantoprazole (PROTONIX) 40 MG tablet Take 1 tablet by mouth 2 times daily (before meals) 20  Mart Trimble MD   pantoprazole (PROTONIX) 40 MG tablet Take 1 tablet by mouth every morning (before breakfast) 20   Mart Trimble MD   bumetanide (BUMEX) 2 MG tablet Take 0.5 tablets by mouth 2 times daily 20   Mart Trimble MD   ferrous sulfate (IRON 325) 325 (65 Fe) MG tablet Take 1 tablet by mouth daily (with breakfast) 20   Alisha Calloway MD   spironolactone (ALDACTONE) 25 MG tablet Take 25 mg by mouth daily Take 1/2 (one-half) tablet by mouth daily    Historical Provider, MD   escitalopram (LEXAPRO) 20 MG tablet Take 20 mg by mouth daily Take one tablet by mouth daily     Historical Provider, MD   gabapentin (NEURONTIN) 600 MG tablet Take 600 mg by mouth 3 times daily. Take 2 tablets by mouth three times daily    Historical Provider, MD   sacubitril-valsartan (ENTRESTO)  MG per tablet Take 1 tablet by mouth 2 times daily     Historical Provider, MD   zolpidem (AMBIEN) 10 MG tablet Take by mouth nightly as needed for Sleep. Historical Provider, MD   nitroGLYCERIN (NITROSTAT) 0.4 MG SL tablet Place 1 tablet under the tongue every 5 minutes as needed for Chest pain up to max of 3 total doses. If no relief after 1 dose, call 911. 3/28/17   SHANDRA Amaya - CNP   atorvastatin (LIPITOR) 80 MG tablet Take 1 tablet by mouth nightly 1/23/17   Cathi Turner DO   Omega-3 Fatty Acids (FISH OIL PO) Take 1 tablet by mouth 2 times daily    Historical Provider, MD   folic acid (FOLVITE) 1 MG tablet Take 1 mg by mouth daily    Historical Provider, MD   clopidogrel (PLAVIX) 75 MG tablet Take 75 mg by mouth daily    Historical Provider, MD   HYDROcodone-acetaminophen (NORCO) 7.5-325 MG per tablet Take 1 tablet by mouth every 6 hours as needed for Pain.      Historical Provider, MD       Current medications:    Current Outpatient Medications   Medication Sig Dispense Refill    levothyroxine (SYNTHROID) 75 MCG tablet Take 75 mcg by mouth Daily      cholestyramine light 4 g packet Take 4 g by mouth 2 times daily      escitalopram (LEXAPRO) 10 MG tablet Take 10 mg by mouth daily      Vitamin D, Cholecalciferol, 25 MCG (1000 UT) CAPS Take by mouth Takes on Monday      glimepiride (AMARYL) 1 MG tablet Take 2 mg by mouth every evening      metFORMIN (GLUCOPHAGE) 1000 MG tablet Take 1,000 mg by mouth Daily with supper      allopurinol (ZYLOPRIM) 300 MG tablet Take 300 mg by mouth daily      meclizine (ANTIVERT) 25 MG tablet Take 25 mg by mouth 3 times daily as needed (Patient not taking: No sig reported)      XARELTO 20 MG TABS tablet       potassium chloride (KLOR-CON M) 20 MEQ extended release tablet Take 1 tablet by mouth daily 30 tablet 0    pantoprazole (PROTONIX) 40 MG tablet Take 1 tablet by mouth 2 times daily (before meals) 120 tablet 0    pantoprazole (PROTONIX) 40 MG tablet Take 1 tablet by mouth every morning (before breakfast) 90 tablet 0    bumetanide (BUMEX) 2 MG tablet Take 0.5 tablets by mouth 2 times daily 30 tablet 0    ferrous sulfate (IRON 325) 325 (65 Fe) MG tablet Take 1 tablet by mouth daily (with breakfast) 30 tablet 0    spironolactone (ALDACTONE) 25 MG tablet Take 25 mg by mouth daily Take 1/2 (one-half) tablet by mouth daily      escitalopram (LEXAPRO) 20 MG tablet Take 20 mg by mouth daily Take one tablet by mouth daily       gabapentin (NEURONTIN) 600 MG tablet Take 600 mg by mouth 3 times daily. Take 2 tablets by mouth three times daily      sacubitril-valsartan (ENTRESTO)  MG per tablet Take 1 tablet by mouth 2 times daily       zolpidem (AMBIEN) 10 MG tablet Take by mouth nightly as needed for Sleep.  nitroGLYCERIN (NITROSTAT) 0.4 MG SL tablet Place 1 tablet under the tongue every 5 minutes as needed for Chest pain up to max of 3 total doses. If no relief after 1 dose, call 911. 25 tablet 3    atorvastatin (LIPITOR) 80 MG tablet Take 1 tablet by mouth nightly 30 tablet 3    Omega-3 Fatty Acids (FISH OIL PO) Take 1 tablet by mouth 2 times daily      folic acid (FOLVITE) 1 MG tablet Take 1 mg by mouth daily      clopidogrel (PLAVIX) 75 MG tablet Take 75 mg by mouth daily      HYDROcodone-acetaminophen (NORCO) 7.5-325 MG per tablet Take 1 tablet by mouth every 6 hours as needed for Pain.         Current Facility-Administered Medications   Medication Dose Route Frequency Provider Last Rate Last Admin    0.9 % sodium chloride infusion   IntraVENous Continuous Jese Dent MD 75 mL/hr at 22 1127 New Bag at 22 1127       Allergies:  No Known Allergies    Problem List:    Patient Active Problem List   Diagnosis Code    AICD discharge Z45.02    Essential hypertension I10    Hyperlipidemia E78.5    Type 2 diabetes mellitus without complication, without long-term current use of insulin ([facility]) E11.9    CAD (coronary artery disease) I25.10    Hypophosphatemia E83.39    V tach I47.20    H/O ventricular tachycardia Z86.79    S/P angioplasty with stent Z95.820    S/P ICD (internal cardiac defibrillator) procedure Z95.810    GI bleed K92.2    Depression F32. A       Past Medical History:        Diagnosis Date    Arthritis     CAD (coronary artery disease)     stents x7    CHF (congestive heart failure) ([facility])     Clinical trial participant at discharge 2016    [employer] PSR    Diabetes mellitus ([address]     Former smoker Pt states he quit 27 years ago   Arron Mcgee H/O ventricular tachycardia 3/25/2017    History of blood transfusion     Hyperlipidemia     Hypertension        Past Surgical History:        Procedure Laterality Date    CARDIAC CATHETERIZATION      multiple, last 4-2016    CARDIAC DEFIBRILLATOR PLACEMENT  2016    CARDIAC DEFIBRILLATOR PLACEMENT  2020    Up grade to Bi-V    COLONOSCOPY      CORONARY ANGIOPLASTY      multiple last date 4-2016    CORONARY ANGIOPLASTY  2020    OTHER SURGICAL HISTORY  2022    Watchman    TRANSESOPHAGEAL ECHOCARDIOGRAM  2022    UPPER GASTROINTESTINAL ENDOSCOPY N/A 2020    EGD BAND LIGATION OF ESOPHAGEAL ULCERS AT GE JUNCTION performed by James Duff MD at [facility]       Social History:    Social History     Tobacco Use    Smoking status: Former     Packs/day: 1.00     Years: 7.00     Pack years: 7.00     Types: Cigarettes     Quit date:      Years since quittin.5    Smokeless tobacco: Never   Substance Use Topics    Alcohol use: Not Currently     Alcohol/week: 1.0 standard drink     Types: 1 Cans of beer per week                                Counseling given: Not Answered      Vital Signs (Current):   Vitals:    12/06/22 1113   BP: (!) 102/59   Pulse: 61   Resp: 13   Temp: 97.8 °F (36.6 °C)   TempSrc: Oral   SpO2: 95%   Weight: 214 lb (97.1 kg)   Height: 5' 8\" (1.727 m)                                              BP Readings from Last 3 Encounters:   12/06/22 (!) 102/59   11/29/22 97/67   01/10/22 109/66       NPO Status: Time of last liquid consumption: 0900                        Time of last solid consumption: 1900                        Date of last liquid consumption: 12/06/22                        Date of last solid food consumption: 12/05/22    BMI:   Wt Readings from Last 3 Encounters:   12/06/22 214 lb (97.1 kg)   11/29/22 214 lb (97.1 kg)   01/10/22 215 lb (97.5 kg)     Body mass index is 32.54 kg/m². CBC:   Lab Results   Component Value Date/Time    WBC 7.5 12/30/2020 07:03 AM    RBC 2.70 12/30/2020 07:03 AM    HGB 8.5 12/30/2020 07:03 AM    HCT 27.0 12/30/2020 07:03 AM    .0 12/30/2020 07:03 AM    RDW 15.4 12/30/2020 07:03 AM     01/10/2022 12:51 PM       CMP:   Lab Results   Component Value Date/Time     12/30/2020 07:03 AM    K 3.6 12/30/2020 07:03 AM     12/30/2020 07:03 AM    CO2 20 12/30/2020 07:03 AM    BUN 26 12/30/2020 07:03 AM    CREATININE 1.78 11/29/2022 09:27 AM    CREATININE 1.05 12/30/2020 07:03 AM    GFRAA >60 12/30/2020 07:03 AM    LABGLOM 46 01/10/2022 12:24 PM    GLUCOSE 87 12/30/2020 07:03 AM    PROT 5.2 12/27/2020 06:17 AM    CALCIUM 8.0 12/30/2020 07:03 AM    BILITOT 0.24 12/27/2020 06:17 AM    ALKPHOS 51 12/27/2020 06:17 AM    AST 22 12/27/2020 06:17 AM    ALT 21 12/27/2020 06:17 AM       POC Tests: No results for input(s): POCGLU, POCNA, POCK, POCCL, POCBUN, POCHEMO, POCHCT in the last 72 hours.     Coags:   Lab Results   Component Value Date/Time    PROTIME 11.1 12/27/2020 06:17 AM    INR 1.1 12/27/2020 06:17 AM    APTT 20.0 12/27/2020 06:17 AM       HCG (If Applicable): No results found for: PREGTESTUR, PREGSERUM, HCG, HCGQUANT     ABGs: No results found for: PHART, PO2ART, PTP1MVC, MBM0TNE, BEART, J6NZUTCN     Type & Screen (If Applicable):  No results found for: LABABO, LABRH    Drug/Infectious Status (If Applicable):  No results found for: HIV, HEPCAB    COVID-19 Screening (If Applicable):   Lab Results   Component Value Date/Time    COVID19 Not Detected 05/28/2020 12:40 PM           Anesthesia Evaluation  Patient summary reviewed no history of anesthetic complications:   Airway: Mallampati: III  TM distance: >3 FB   Neck ROM: full  Mouth opening: > = 3 FB   Dental:      Comment: Missing all upper teeth. Only a few remaining lower teeth - rotten and decaying. Pulmonary:Negative Pulmonary ROS and normal exam                               Cardiovascular:    (+) hypertension:, pacemaker: AICD, CAD:, dysrhythmias:, CHF:,       ECG reviewed      Echocardiogram reviewed                  Neuro/Psych:   Negative Neuro/Psych ROS              GI/Hepatic/Renal: Neg GI/Hepatic/Renal ROS            Endo/Other:    (+) DiabetesType II DM, , .                 Abdominal:             Vascular: negative vascular ROS. Other Findings:           Anesthesia Plan      general     ASA 3       Induction: intravenous. arterial line  MIPS: Postoperative opioids intended, Prophylactic antiemetics administered and Postoperative trial extubation. Anesthetic plan and risks discussed with patient. Use of blood products discussed with patient whom. Plan discussed with CRNA.                     Ramond Closs, MD   12/6/2022

## 2022-12-06 NOTE — PROGRESS NOTES
Presented to bedside as patient was noted to be hypotensive with systolic bp in the 66'B after watchman procedure. IVF bolus initiated. On physical exam noted to have a hematoma in the left groin. Manual pressure held for 20 mins. Hematoma resolving. No oozing or bleeding noted. Blood pressure improving, systolic bp on arterial line noted to be 99 with MAP of 75. Will have RN hold manual pressure for 10 mins and monitor closely for hematoma. 2D ECHO limited showed no pericardial effusion. Dr Shira Harper aware and presented at bedside to evaluate patient. H&H ordered. Follow up on final result.     Jacy Lu MD  Fellow Cardiovascular Disease  [facility]

## 2022-12-06 NOTE — PROGRESS NOTES
Dr. Jenny Dickens notified of patient taking his dose of Plavix and Xarelto on 12/5/2022, no further orders received.

## 2022-12-06 NOTE — ANESTHESIA POSTPROCEDURE EVALUATION
Department of Anesthesiology  Postprocedure Note    Patient: Manuel Bowen  MRN: [id number]  YOB: 1945  Date of evaluation: 12/6/2022      Procedure Summary     Date: 12/06/22 Room / Location: [facility] Cath Lab    Anesthesia Start: 1329 Anesthesia Stop: 4107    Procedure: CATH LAB WITH ANESTHESIA Diagnosis:     Scheduled Providers:  Responsible Provider: Alejandra Rahman MD    Anesthesia Type: general ASA Status: 3          Anesthesia Type: No value filed.     Juan Phase I:      Juan Phase II:        Anesthesia Post Evaluation    Patient location during evaluation: bedside  Patient participation: complete - patient participated  Level of consciousness: awake  Airway patency: patent  Nausea & Vomiting: no nausea and no vomiting  Complications: no  Cardiovascular status: hemodynamically stable  Respiratory status: acceptable  Hydration status: stable  Comments: BP (!) 102/59   Pulse 61   Temp 97.8 °F (36.6 °C) (Oral)   Resp 13   Ht 5' 8\" (1.727 m)   Wt 214 lb (97.1 kg)   SpO2 95%   BMI 32.54 kg/m²

## 2022-12-06 NOTE — PROGRESS NOTES
Patient admitted, consent signed and questions answered. Patient ready for procedure. Call light to reach with side rails up 2 of 2. Bilateral groin clipped with writer and Beckie Lebron RN present. Family at bedside with patient. History and physical needs to be completed. Ludivina Sanchez

## 2022-12-07 VITALS
HEART RATE: 78 BPM | OXYGEN SATURATION: 96 % | TEMPERATURE: 98.2 F | RESPIRATION RATE: 21 BRPM | DIASTOLIC BLOOD PRESSURE: 59 MMHG | SYSTOLIC BLOOD PRESSURE: 99 MMHG | BODY MASS INDEX: 32.34 KG/M2 | HEIGHT: 68 IN | WEIGHT: 213.41 LBS

## 2022-12-07 LAB
ANION GAP SERPL CALCULATED.3IONS-SCNC: 11 MMOL/L (ref 9–17)
BUN BLDV-MCNC: 35 MG/DL (ref 8–23)
CALCIUM SERPL-MCNC: 8.3 MG/DL (ref 8.6–10.4)
CHLORIDE BLD-SCNC: 103 MMOL/L (ref 98–107)
CO2: 20 MMOL/L (ref 20–31)
CREAT SERPL-MCNC: 1.52 MG/DL (ref 0.7–1.2)
GFR SERPL CREATININE-BSD FRML MDRD: 47 ML/MIN/1.73M2
GLUCOSE BLD-MCNC: 179 MG/DL (ref 75–110)
GLUCOSE BLD-MCNC: 233 MG/DL (ref 75–110)
GLUCOSE BLD-MCNC: 238 MG/DL (ref 75–110)
GLUCOSE BLD-MCNC: 254 MG/DL (ref 70–99)
HCT VFR BLD CALC: 38.7 % (ref 40.7–50.3)
HEMOGLOBIN: 12.4 G/DL (ref 13–17)
MCH RBC QN AUTO: 32 PG (ref 25.2–33.5)
MCHC RBC AUTO-ENTMCNC: 32 G/DL (ref 28.4–34.8)
MCV RBC AUTO: 100 FL (ref 82.6–102.9)
NRBC AUTOMATED: 0 PER 100 WBC
PDW BLD-RTO: 14.2 % (ref 11.8–14.4)
PLATELET # BLD: 155 K/UL (ref 138–453)
PMV BLD AUTO: 10.9 FL (ref 8.1–13.5)
POTASSIUM SERPL-SCNC: 5.5 MMOL/L (ref 3.7–5.3)
RBC # BLD: 3.87 M/UL (ref 4.21–5.77)
SODIUM BLD-SCNC: 134 MMOL/L (ref 135–144)
WBC # BLD: 6.6 K/UL (ref 3.5–11.3)

## 2022-12-07 PROCEDURE — G0378 HOSPITAL OBSERVATION PER HR: HCPCS

## 2022-12-07 PROCEDURE — 2700000000 HC OXYGEN THERAPY PER DAY

## 2022-12-07 PROCEDURE — 6370000000 HC RX 637 (ALT 250 FOR IP): Performed by: STUDENT IN AN ORGANIZED HEALTH CARE EDUCATION/TRAINING PROGRAM

## 2022-12-07 PROCEDURE — 2000000000 HC ICU R&B

## 2022-12-07 PROCEDURE — 96372 THER/PROPH/DIAG INJ SC/IM: CPT

## 2022-12-07 PROCEDURE — 82947 ASSAY GLUCOSE BLOOD QUANT: CPT

## 2022-12-07 PROCEDURE — 80048 BASIC METABOLIC PNL TOTAL CA: CPT

## 2022-12-07 PROCEDURE — 2580000003 HC RX 258: Performed by: STUDENT IN AN ORGANIZED HEALTH CARE EDUCATION/TRAINING PROGRAM

## 2022-12-07 PROCEDURE — 85027 COMPLETE CBC AUTOMATED: CPT

## 2022-12-07 PROCEDURE — 94761 N-INVAS EAR/PLS OXIMETRY MLT: CPT

## 2022-12-07 PROCEDURE — 6360000002 HC RX W HCPCS: Performed by: STUDENT IN AN ORGANIZED HEALTH CARE EDUCATION/TRAINING PROGRAM

## 2022-12-07 PROCEDURE — 36415 COLL VENOUS BLD VENIPUNCTURE: CPT

## 2022-12-07 RX ORDER — INSULIN LISPRO 100 [IU]/ML
0-8 INJECTION, SOLUTION INTRAVENOUS; SUBCUTANEOUS
Status: DISCONTINUED | OUTPATIENT
Start: 2022-12-07 | End: 2022-12-07 | Stop reason: HOSPADM

## 2022-12-07 RX ORDER — DEXTROSE MONOHYDRATE 100 MG/ML
INJECTION, SOLUTION INTRAVENOUS CONTINUOUS PRN
Status: DISCONTINUED | OUTPATIENT
Start: 2022-12-07 | End: 2022-12-07 | Stop reason: HOSPADM

## 2022-12-07 RX ORDER — INSULIN LISPRO 100 [IU]/ML
0-4 INJECTION, SOLUTION INTRAVENOUS; SUBCUTANEOUS NIGHTLY
Status: DISCONTINUED | OUTPATIENT
Start: 2022-12-07 | End: 2022-12-07 | Stop reason: HOSPADM

## 2022-12-07 RX ORDER — DEXTROSE MONOHYDRATE 100 MG/ML
INJECTION, SOLUTION INTRAVENOUS CONTINUOUS PRN
Status: DISCONTINUED | OUTPATIENT
Start: 2022-12-07 | End: 2022-12-07 | Stop reason: SDUPTHER

## 2022-12-07 RX ADMIN — HYDROCODONE BITARTRATE AND ACETAMINOPHEN 1 TABLET: 5; 325 TABLET ORAL at 09:25

## 2022-12-07 RX ADMIN — SODIUM CHLORIDE, PRESERVATIVE FREE 10 ML: 5 INJECTION INTRAVENOUS at 08:33

## 2022-12-07 RX ADMIN — SODIUM CHLORIDE, PRESERVATIVE FREE 10 ML: 5 INJECTION INTRAVENOUS at 08:36

## 2022-12-07 RX ADMIN — ASPIRIN 81 MG: 81 TABLET, CHEWABLE ORAL at 08:42

## 2022-12-07 RX ADMIN — INSULIN LISPRO 2 UNITS: 100 INJECTION, SOLUTION INTRAVENOUS; SUBCUTANEOUS at 08:32

## 2022-12-07 RX ADMIN — ENOXAPARIN SODIUM 40 MG: 100 INJECTION SUBCUTANEOUS at 08:42

## 2022-12-07 RX ADMIN — SODIUM CHLORIDE, PRESERVATIVE FREE 10 ML: 5 INJECTION INTRAVENOUS at 08:37

## 2022-12-07 ASSESSMENT — PAIN SCALES - GENERAL
PAINLEVEL_OUTOF10: 7
PAINLEVEL_OUTOF10: 0
PAINLEVEL_OUTOF10: 3
PAINLEVEL_OUTOF10: 5
PAINLEVEL_OUTOF10: 0
PAINLEVEL_OUTOF10: 5

## 2022-12-07 ASSESSMENT — PAIN DESCRIPTION - LOCATION
LOCATION: BACK

## 2022-12-07 ASSESSMENT — PAIN DESCRIPTION - ORIENTATION
ORIENTATION: LOWER

## 2022-12-07 ASSESSMENT — PAIN DESCRIPTION - DESCRIPTORS
DESCRIPTORS: ACHING

## 2022-12-07 NOTE — DISCHARGE SUMMARY
[facility]  Discharge Note                 Name:  Brian Fishman  YOB: 1945  Social Security Number:  [id number]  Medical Record Number:  [id number]    Date of Admission:  12/6/2022  Date of Discharge:  12/7/2022    Admitting physician: Raisa Batista MD    Discharge Attending: Dr. Perry Lung Physician: Heidi Gaytan MD  Consultants: None  Discharge to [address] [place] [address] LIST:  Patient Active Problem List   Diagnosis    AICD discharge    Essential hypertension    Hyperlipidemia    Type 2 diabetes mellitus without complication, without long-term current use of insulin (HCC)    CAD (coronary artery disease)    Hypophosphatemia    V tach    H/O ventricular tachycardia    S/P angioplasty with stent    S/P ICD (internal cardiac defibrillator) procedure    GI bleed    Depression    Presence of Watchman left atrial appendage closure device         Procedures:  Watchman device 12/06/2022    HOSPITAL COURSE :   The patient was admitted for: Watchman device and underwent the same on 12/06/2022. He was hypotensive post procedure, stat 2D ECHO revealed no concern for pericardial effusion. He was noted to have hematoma at the access site that improved and BP stabilized. He was discharged home in stable condition. Hospital Procedures if any: Watchman device   Medications changes recommendation: As per discharge list   Follow Up Plan: 4 weeks as outpatient     Discharge exam:   Vitals:    12/07/22 1200   BP: 105/70   Pulse: 61   Resp: 16   Temp: 98.2 °F (36.8 °C)   SpO2: 96%       Patient is feeling much better, denies any chest pain,k dyspnea, orthopnea or palpitations. Neuro: normal  Chest: Clear to ausculation. No wheezing.    Cardiac: Cor RRR  Abdomen/groin: soft, non-tender, without masses or organomegaly  Lower extremity edema: none     Follow up with primary care provider 1 week  Follow up with cardiology 4 weeks  Follow up with other consultant physicians at their directions. Discharge Medications:     Medication List        CONTINUE taking these medications      allopurinol 300 MG tablet  Commonly known as: ZYLOPRIM     atorvastatin 80 MG tablet  Commonly known as: LIPITOR  Take 1 tablet by mouth nightly     bumetanide 2 MG tablet  Commonly known as: BUMEX  Take 0.5 tablets by mouth 2 times daily     cholestyramine light 4 g packet     clopidogrel 75 MG tablet  Commonly known as: PLAVIX     ferrous sulfate 325 (65 Fe) MG tablet  Commonly known as: IRON 325  Take 1 tablet by mouth daily (with breakfast)     FISH OIL PO     folic acid 1 MG tablet  Commonly known as: FOLVITE     gabapentin 600 MG tablet  Commonly known as: NEURONTIN     glimepiride 1 MG tablet  Commonly known as: AMARYL     HYDROcodone-acetaminophen 7.5-325 MG per tablet  Commonly known as: NORCO     levothyroxine 75 MCG tablet  Commonly known as: SYNTHROID     * Lexapro 20 MG tablet  Generic drug: escitalopram     * escitalopram 10 MG tablet  Commonly known as: LEXAPRO     metFORMIN 1000 MG tablet  Commonly known as: GLUCOPHAGE     nitroGLYCERIN 0.4 MG SL tablet  Commonly known as: Nitrostat  Place 1 tablet under the tongue every 5 minutes as needed for Chest pain up to max of 3 total doses. If no relief after 1 dose, call 911. * pantoprazole 40 MG tablet  Commonly known as: PROTONIX  Take 1 tablet by mouth 2 times daily (before meals)     * pantoprazole 40 MG tablet  Commonly known as: PROTONIX  Take 1 tablet by mouth every morning (before breakfast)     potassium chloride 20 MEQ extended release tablet  Commonly known as: KLOR-CON M  Take 1 tablet by mouth daily     spironolactone 25 MG tablet  Commonly known as: ALDACTONE     Vitamin D (Cholecalciferol) 25 MCG (1000 UT) Caps     Xarelto 20 MG Tabs tablet  Generic drug: rivaroxaban     zolpidem 10 MG tablet  Commonly known as: AMBIEN           * This list has 4 medication(s) that are the same as other medications prescribed for you.  Read the directions carefully, and ask your doctor or other care provider to review them with you. STOP taking these medications      sacubitril-valsartan  MG per tablet  Commonly known as: ENTRESTO            ASK your doctor about these medications      meclizine 25 MG tablet  Commonly known as: ANTIVERT                 Discussed with patient and nursing. Medications and discharge instructions reviewed with patient and nursing. Patient counseled in detail regarding medication compliance and risk factor modification. Electronically signed by Song Garduno MD on 12/7/2022 at 12:33 PM  Fellow, [address] [place] [address]     Attending Cardiologist Addendum: I have reviewed and performed the history, physical, subjective, objective, assessment, and plan with the student/resident/fellow/APN and agree with the note. I performed the history and physical personally. I have made changes to the note above as needed. Pt seen and examined  Events of yesterday noted  Echo- no effusion  BP improved  Groin is soft  Hgb stable  He wants to go home  Keep off entresto until see in office  Can resume AC later tonight if groin remains stable  Follow up with Dr. Margarita Rahman in 1-2 weeks. Thank you for allowing me to participate in the care of this patient, please do not hesitate to call if you have any questions. Jill Pena DO, [facility] [place], [address], [address] [facility]  [facility] [employer]  [phone]

## 2022-12-07 NOTE — PROGRESS NOTES
[facility], [place]    Brief Operative Note    Pre-operative diagnosis: NSCLC  Post-operative diagnosis NSCLC  Procedure:   Surgeon: Dr. Barbosa  Anesthesia: Moderate Sedation   Estimated blood loss: Minimal  Drains: None  Specimens: 5 fine needle aspirates  Findings:   See imaging report.  Complications: None.  Implants: None.         Discharge instructions reviewed with patient and family and questions  answered at this time. ID band, monitoring devices, and IV's removed. Patient transported via wheelchair with all belongings to discharge door.

## 2022-12-07 NOTE — PROGRESS NOTES
Spoken with nevin Lorenzana to remove fem stop (Arrived @ 0mmHg), apply manual pressure for 5-10 minutes. Fem stop removed @ 2100, Pt tolerated well, applied transparent dressing to assess for bleeding and area marked to monitor extension of hematoma. Orders to remain flat for four hours. Sheath in right fem will remain in place to be reassessed by Dr Cari Clay at a later time. 0200 - Labile blood pressures, lower when asleep (SBP in 70s). Asymptomatic, vitals otherwise stable. Dr Cari dickeyay to keep MAP>60.

## 2022-12-07 NOTE — DISCHARGE INSTRUCTIONS
Stop taking entresto. Continue taking all other medications as previously prescribed including Plavix and xarelto.

## 2022-12-07 NOTE — PROGRESS NOTES
SPIRITUAL CARE DEPARTMENT - [facility] [address]  PROGRESS NOTE    Shift date: 12/7/2022  Shift day: Wednesday   Shift # 1    Room # 1008/1008-01   Name: Kyara Adame                Jew: Irvin Delong 32 of Episcopal: Unknown    Referral: Routine Visit    Admit Date & Time: 12/6/2022  7:36 PM    Assessment:  Kyara Adame is a 67 y.o. male in the hospital because presence of Watchman left atrial appendage closure device. Upon entering the room writer observes patient is sitting up in chair. Patient is smiling. Nurse is folding up blankets on the bed. Nurse invites  into room. Intervention:  Writer introduced self and title as  Writer offered space for patient  to express feelings, needs, and concerns and provided a ministry presence. Patient is smiling and says that he is doing well. Patient says that he doesn't have any needs at this time. Outcome:  Patient is aware of spiritual care and how to contact them. Plan:  Chaplains will remain available to offer spiritual and emotional support as needed. Electronically signed by Kitty Narvaez, on 12/7/2022 at 12:03 PM.  [facility]  [phone]       12/07/22 1200   Encounter Summary   Service Provided For: Patient   Referral/Consult From: [facility]   Support System Unknown   Last Encounter  12/07/22   Complexity of Encounter Moderate   Begin Time 1054   End Time  1056   Total Time Calculated 2 min   Assessment/Intervention/Outcome   Assessment Calm; Hopeful   Intervention Active listening;Sustaining Presence/Ministry of presence   Outcome Expressed Gratitude;Encouraged;Coping

## 2022-12-07 NOTE — CARE COORDINATION
12/07/22 1429   Service Assessment   Patient Orientation Alert and Oriented   Cognition Alert   History Provided By Patient   Primary Caregiver Self   Accompanied By/Relationship Wife [address] Other;Children   Patient's [address] is: Legal Next of Reese Vazquez   PCP Verified by CM Yes  (Dr Tamar Pham)   Prior Functional Level Independent in ADLs/IADLs   Current Functional Level Independent in ADLs/IADLs   Can patient return to prior living arrangement Yes   Ability to make needs known: Good   Family able to assist with home care needs: Yes   Financial Resources Medicare   Discharge Planning   Type of Residence House   Living Arrangements Spouse/Significant Other   Current Services Prior To Admission None   Potential Assistance Needed N/A   DME Ordered? No   Potential Assistance Purchasing Medications No   Type of Home Care Services None   Patient expects to be discharged to: House   History of falls? 0   Services At/After Discharge   Confirm Follow Up Transport Family   Pt plans to transition back to home. Lives on 1st level of 2 story home with wife. Denies needs at this time.

## 2023-03-17 ENCOUNTER — HOSPITAL ENCOUNTER (OUTPATIENT)
Dept: GENERAL RADIOLOGY | Facility: CLINIC | Age: 78
End: 2023-03-17
Payer: MEDICARE

## 2023-03-17 ENCOUNTER — HOSPITAL ENCOUNTER (OUTPATIENT)
Facility: CLINIC | Age: 78
End: 2023-03-17
Payer: MEDICARE

## 2023-03-17 DIAGNOSIS — T82.120A DISPLACEMENT OF IMPLANTABLE CARDIOVERTER-DEFIBRILLATOR (ICD) LEAD, INITIAL ENCOUNTER: ICD-10-CM

## 2023-03-17 PROCEDURE — 71046 X-RAY EXAM CHEST 2 VIEWS: CPT

## 2023-09-15 ENCOUNTER — HOSPITAL ENCOUNTER (OUTPATIENT)
Dept: GENERAL RADIOLOGY | Facility: CLINIC | Age: 78
End: 2023-09-15
Attending: INTERNAL MEDICINE
Payer: MEDICARE

## 2023-09-15 DIAGNOSIS — Z95.810 ICD (IMPLANTABLE CARDIOVERTER-DEFIBRILLATOR) IN PLACE: ICD-10-CM

## 2023-09-15 PROBLEM — Z45.02 AICD DISCHARGE: Status: RESOLVED | Noted: 2017-01-18 | Resolved: 2023-09-15

## 2023-09-15 PROCEDURE — 71046 X-RAY EXAM CHEST 2 VIEWS: CPT

## 2024-04-21 NOTE — PROGRESS NOTES
[facility]. [facility] Gastroenterology Progress Note    Corrina Moreno is a 75 y.o. male patient. Hospitalization Day:2      Chief consult reason: Hematemesis and melena      Subjective: Patient seen and examined. Patient reports having brown stool today. He is tolerating diet full liquid. No nausea, vomiting, or abdominal pain      Objective:   VITALS:  BP (!) 88/55   Pulse 65   Temp 97.7 °F (36.5 °C) (Oral)   Resp 16   Ht 5' 8\" (1.727 m)   Wt 217 lb 2.5 oz (98.5 kg)   SpO2 96%   BMI 33.02 kg/m²   TEMPERATURE:  Current - Temp: 97.7 °F (36.5 °C);  Max - Temp  Av.3 °F (36.8 °C)  Min: 97.3 °F (36.3 °C)  Max: 98.7 °F (37.1 °C)    Physical Assessment:  General appearance:  alert, cooperative and no distress  Mental Status:  oriented to person, place and time and normal affect  Lungs:  clear to auscultation bilaterally, normal effort  Heart:  regular rate and rhythm, no murmur  Abdomen:  soft, nontender, nondistended, normal bowel sounds, no masses  Extremities:  no edema, no redness, No clubbing  Skin:  warm, dry, no gross lesions or rashes    CURRENT MEDICATIONS:  Scheduled Meds:   sodium chloride  250 mL Intravenous Once    clopidogrel  75 mg Oral Daily    citalopram  40 mg Oral Daily    atorvastatin  80 mg Oral Nightly    aspirin  81 mg Oral Daily    folic acid  1 mg Oral Daily    gabapentin  300 mg Oral TID    insulin lispro  0-12 Units Subcutaneous TID     insulin lispro  0-6 Units Subcutaneous Nightly    sodium chloride  250 mL Intravenous Once    sodium chloride flush  10 mL Intravenous 2 times per day     Continuous Infusions:   dextrose      pantoprozole (PROTONIX) infusion 8 mg/hr (20 1428)     PRN Meds:glucose, dextrose, glucagon (rDNA), dextrose, sodium chloride flush, promethazine **OR** ondansetron, polyethylene glycol, acetaminophen **OR** acetaminophen, potassium chloride **OR** potassium alternative oral replacement **OR** potassium chloride, magnesium sulfate, potassium chloride, HYDROcodone-acetaminophen      Data Review:  LABS and IMAGING:     CBC  Recent Labs     12/27/20  0603 12/27/20  0603 12/27/20  2253 12/28/20  0508 12/28/20  1145 12/28/20  2155 12/29/20  0641   WBC 22.5*  --   --  10.4  --   --  8.3   HGB 7.5*   < > 7.5* 7.2* 8.4* 7.0* 8.6*   HCT 25.7*   < > 23.0* 22.5* 27.5* 22.5* 27.4*   .4*  --   --  97.4  --   --  98.2   MCHC 29.2  --   --  32.0  --   --  31.4   RDW 12.8  --   --  15.4*  --   --  14.7*     --   --  235  --   --  244    < > = values in this interval not displayed. Immature PLTs   No results found for: PLTFLUORE    ANEMIA STUDIES  No results for input(s): LABIRON, TIBC, IRON, FERRITIN, [id number], FOLATE, OCCULTBLD in the last 72 hours.     BMP  Recent Labs     12/27/20  0603 12/28/20  0508 12/29/20  0641    141 141   K 4.6 4.2 3.9    112* 111*   CO2 15* 22 21   BUN 71* 56* 29*   CREATININE 1.30* 1.17 0.97   GLUCOSE 220* 87 107*   CALCIUM 8.2* 7.5* 7.9*       LFTS  Recent Labs     12/27/20  0603 12/27/20  0617   ALKPHOS 51 51   ALT 21 21   AST 25 22   BILITOT 0.24* 0.24*   BILIDIR  --  <0.08   LABALBU 2.7* 2.9*       AMYLASE/LIPASE/AMMONIA  Recent Labs     12/27/20  0617   LIPASE 86*   AMMONIA 14*       Acute Hepatitis Panel   No results found for: HEPBSAG, HEPCAB, HEPBIGM, HEPAIGM    HCV Genotype   No results found for: HEPATITISCGENOTYPE    HCV Quantitative   No results found for: HCVQNT    LIVER WORK UP:    AFP  No results found for: AFP    Alpha 1 antitrypsin   No results found for: A1A    Anti - Liver/Kidney Ab  No results found for: LIVER-KIDNEYMICROSOMALAB    ADAM  No results found for: ADAM    AMA  No results found for: MITOAB    ASMA  No results found for: SMOOTHMUSCAB    Ceruloplasmin  No results found for: CERULOPLSM    Celiac panel  No results found for: TISSTRNTIIGG, TTGIGA, IGA    IgG  No results found for: IGG    IgM  No results found for: IGM    GGT   No results found for: LABGGT    PT/INR  Recent Labs 12/27/20  0617   PROTIME 11.1   INR 1.1       Cancer Markers:  CEA:  No results for input(s): CEA in the last 72 hours. Ca 125:  No results for input(s):  in the last 72 hours. Ca 19-9:   No results for input(s):  in the last 72 hours. AFP: No results for input(s): AFP in the last 72 hours. Lactic acid:  Recent Labs     12/28/20  0913   LACTACIDWB 1.7         Radiology Review:    Xr Chest Portable    Result Date: 12/27/2020  EXAMINATION: ONE XRAY VIEW OF THE CHEST 12/27/2020 7:38 am COMPARISON: 06/02/2020 HISTORY: ORDERING SYSTEM PROVIDED HISTORY: hematemesis TECHNOLOGIST PROVIDED HISTORY: hematemesis FINDINGS: Left-sided tripolar cardiac pacer/defibrillator is unchanged in configuration. The lungs are clear. The heart is enlarged with a normal appearance of pulmonary vasculature. There is no pneumothorax or effusion. Cardiomegaly. Cta Abdomen Pelvis W Wo Contrast    Result Date: 12/27/2020  EXAMINATION: CTA OF THE ABDOMEN AND PELVIS WITH AND WITHOUT CONTRAST 12/27/2020 8:40 am: TECHNIQUE: CTA of the abdomen and pelvis was performed without and with the administration of intravenous contrast. Multiplanar reformatted images are provided for review. MIP images are provided for review. Dose modulation, iterative reconstruction, and/or weight based adjustment of the mA/kV was utilized to reduce the radiation dose to as low as reasonably achievable. COMPARISON: None. HISTORY: ORDERING SYSTEM PROVIDED HISTORY: GI Bleed TECHNOLOGIST PROVIDED HISTORY: GI Bleed Reason for Exam: GI bleed Acuity: Acute Type of Exam: Initial FINDINGS: Vascular: There is moderate diffuse atherosclerotic disease. Normal caliber abdominal aorta showing no evidence for aneurysm or dissection. Normal enhancement of celiac axis and visualized proximal branches. Normal enhancement of SMA visualized proximal branches. ZELDA and iliac arteries enhance normally. Normal enhancement of bilateral renal arteries.   Scattered [id number] [address] Curtis for clears.   - OK for ASA/Plavix. - D/C Octreotide drip.      Electronically signed by Lovely Gavin MD, [facility]  on 12/27/2020 at 2:07 PM        Principal Problem:    GI bleed  Active Problems:    H/O ventricular tachycardia    Essential hypertension    Hyperlipidemia    Type 2 diabetes mellitus without complication, without long-term current use of insulin (HCC)    CAD (coronary artery disease)    S/P angioplasty with stent    S/P ICD (internal cardiac defibrillator) procedure    Depression  Resolved Problems:    * No resolved hospital problems. *       GI Assessment:    1. Upper GI bleed with hematemesis and melena. - 12/27/2020 - EGD -2 small ulcers at the GE junction with visible vessel s/p banding x 2  2. Acute blood loss anemia  3. Hypotension - secondary to acute blood loss  4. Hx of CAD with stents on DAPT  5. Recent hx of COVID 19 - 11/23/2020  6. Leukocytosis          Plan of care:  1. Discontinue PPI drip and change to Protonix 40 mg p.o. twice daily x 8 weeks then once daily indefinitely as secondary prophylaxis as long as patient remains on dual antiplatelet therapy. 2. Advance to low fiber diet  3. Ok for ASA and Plavix  4. Follow up with GI in O/P setting. GI will sign off. Please feel free to contact me with any questions or concerns. Thank you for allowing me to participate in the care of your patient. SHANDRA Trevino - CNP on 12/29/2020 at 4:42 PM   [facility]    Please note that this note was generated using a voice recognition dictation software. Although every effort was made to ensure the accuracy of this automated transcription, some errors in transcription may have occurred. - Pt w/ c/f urinary retention given HERBERT, started on Flomax to remove mckee.  - Continue flomax  - TOV successful

## (undated) DEVICE — MULTIPLE BAND LIGATOR: Brand: SPEEDBAND SUPERVIEW SUPER 7